# Patient Record
Sex: FEMALE | Race: WHITE | NOT HISPANIC OR LATINO | Employment: OTHER | URBAN - NONMETROPOLITAN AREA
[De-identification: names, ages, dates, MRNs, and addresses within clinical notes are randomized per-mention and may not be internally consistent; named-entity substitution may affect disease eponyms.]

---

## 2017-01-16 ENCOUNTER — OFFICE VISIT (OUTPATIENT)
Dept: UROLOGY | Facility: CLINIC | Age: 72
End: 2017-01-16

## 2017-01-16 VITALS
WEIGHT: 155.6 LBS | SYSTOLIC BLOOD PRESSURE: 120 MMHG | DIASTOLIC BLOOD PRESSURE: 78 MMHG | HEIGHT: 65 IN | TEMPERATURE: 97.3 F | BODY MASS INDEX: 25.92 KG/M2

## 2017-01-16 DIAGNOSIS — R31.0 GROSS HEMATURIA: Primary | ICD-10-CM

## 2017-01-16 LAB
BILIRUB BLD-MCNC: NEGATIVE MG/DL
CLARITY, POC: CLEAR
COLOR UR: YELLOW
GLUCOSE UR STRIP-MCNC: NEGATIVE MG/DL
KETONES UR QL: ABNORMAL
LEUKOCYTE EST, POC: NEGATIVE
NITRITE UR-MCNC: NEGATIVE MG/ML
PH UR: 5 [PH] (ref 5–8)
PROT UR STRIP-MCNC: NEGATIVE MG/DL
RBC # UR STRIP: ABNORMAL /UL
SP GR UR: 1.03 (ref 1–1.03)
UROBILINOGEN UR QL: NORMAL

## 2017-01-16 PROCEDURE — 99204 OFFICE O/P NEW MOD 45 MIN: CPT | Performed by: UROLOGY

## 2017-01-16 PROCEDURE — 88112 CYTOPATH CELL ENHANCE TECH: CPT | Performed by: UROLOGY

## 2017-01-16 PROCEDURE — 81003 URINALYSIS AUTO W/O SCOPE: CPT | Performed by: UROLOGY

## 2017-01-18 LAB
CYTO UR: NORMAL
LAB AP CASE REPORT: NORMAL
Lab: NORMAL
PATH REPORT.FINAL DX SPEC: NORMAL
PATH REPORT.GROSS SPEC: NORMAL

## 2017-01-20 ENCOUNTER — APPOINTMENT (OUTPATIENT)
Dept: CT IMAGING | Facility: HOSPITAL | Age: 72
End: 2017-01-20
Attending: UROLOGY

## 2017-01-23 ENCOUNTER — HOSPITAL ENCOUNTER (OUTPATIENT)
Dept: CT IMAGING | Facility: HOSPITAL | Age: 72
Discharge: HOME | End: 2017-01-23
Attending: UROLOGY | Admitting: UROLOGY

## 2017-01-23 ENCOUNTER — PROCEDURE VISIT (OUTPATIENT)
Dept: UROLOGY | Facility: CLINIC | Age: 72
End: 2017-01-23

## 2017-01-23 VITALS — WEIGHT: 155 LBS | BODY MASS INDEX: 25.83 KG/M2 | HEIGHT: 65 IN

## 2017-01-23 DIAGNOSIS — R31.0 GROSS HEMATURIA: ICD-10-CM

## 2017-01-23 DIAGNOSIS — R31.0 GROSS HEMATURIA: Primary | ICD-10-CM

## 2017-01-23 LAB
BILIRUB BLD-MCNC: NEGATIVE MG/DL
CLARITY, POC: CLEAR
COLOR UR: YELLOW
CREAT BLDA-MCNC: 0.7 MG/DL (ref 0.6–1.3)
GLUCOSE UR STRIP-MCNC: NEGATIVE MG/DL
KETONES UR QL: NEGATIVE
LEUKOCYTE EST, POC: NEGATIVE
NITRITE UR-MCNC: NEGATIVE MG/ML
PH UR: 6 [PH] (ref 5–8)
PROT UR STRIP-MCNC: NEGATIVE MG/DL
RBC # UR STRIP: ABNORMAL /UL
SP GR UR: 1.01 (ref 1–1.03)
UROBILINOGEN UR QL: NORMAL

## 2017-01-23 PROCEDURE — 81003 URINALYSIS AUTO W/O SCOPE: CPT | Performed by: UROLOGY

## 2017-01-23 PROCEDURE — 52000 CYSTOURETHROSCOPY: CPT | Performed by: UROLOGY

## 2017-01-23 PROCEDURE — 0 IOPAMIDOL 61 % SOLUTION: Performed by: UROLOGY

## 2017-01-23 PROCEDURE — 74178 CT ABD&PLV WO CNTR FLWD CNTR: CPT

## 2017-01-23 PROCEDURE — 82565 ASSAY OF CREATININE: CPT

## 2017-01-23 RX ADMIN — IOPAMIDOL 100 ML: 612 INJECTION, SOLUTION INTRAVENOUS at 12:15

## 2017-02-01 ENCOUNTER — PROCEDURE VISIT (OUTPATIENT)
Dept: OTOLARYNGOLOGY | Facility: CLINIC | Age: 72
End: 2017-02-01

## 2017-02-01 DIAGNOSIS — H90.5 HEARING LOSS, SENSORINEURAL: Primary | ICD-10-CM

## 2017-02-01 PROCEDURE — HEARINGNOCHG: Performed by: OTOLARYNGOLOGY

## 2017-04-02 ENCOUNTER — HOSPITAL ENCOUNTER (EMERGENCY)
Facility: HOSPITAL | Age: 72
Discharge: HOME | End: 2017-04-02
Attending: EMERGENCY MEDICINE | Admitting: EMERGENCY MEDICINE

## 2017-04-02 VITALS
RESPIRATION RATE: 14 BRPM | HEIGHT: 65 IN | BODY MASS INDEX: 24.66 KG/M2 | TEMPERATURE: 97.6 F | OXYGEN SATURATION: 95 % | WEIGHT: 148 LBS | SYSTOLIC BLOOD PRESSURE: 115 MMHG | DIASTOLIC BLOOD PRESSURE: 72 MMHG | HEART RATE: 74 BPM

## 2017-04-02 DIAGNOSIS — R53.1 WEAKNESS: Primary | ICD-10-CM

## 2017-04-02 LAB
ALBUMIN SERPL-MCNC: 4.5 G/DL (ref 3.5–5)
ALBUMIN/GLOB SERPL: 1.6 G/DL (ref 1.1–2.5)
ALP SERPL-CCNC: 90 U/L (ref 24–120)
ALT SERPL W P-5'-P-CCNC: 28 U/L (ref 0–54)
ANION GAP SERPL CALCULATED.3IONS-SCNC: 14 MMOL/L (ref 4–13)
AST SERPL-CCNC: 24 U/L (ref 7–45)
BACTERIA UR QL AUTO: ABNORMAL /HPF
BASOPHILS # BLD AUTO: 0.05 10*3/MM3 (ref 0–0.2)
BASOPHILS NFR BLD AUTO: 0.8 % (ref 0–2)
BILIRUB SERPL-MCNC: 0.7 MG/DL (ref 0.1–1)
BILIRUB UR QL STRIP: NEGATIVE
BUN BLD-MCNC: 16 MG/DL (ref 5–21)
BUN/CREAT SERPL: 21.1 (ref 7–25)
CALCIUM SPEC-SCNC: 10.7 MG/DL (ref 8.4–10.4)
CHLORIDE SERPL-SCNC: 101 MMOL/L (ref 98–110)
CLARITY UR: CLEAR
CO2 SERPL-SCNC: 27 MMOL/L (ref 24–31)
COLOR UR: YELLOW
CREAT BLD-MCNC: 0.76 MG/DL (ref 0.5–1.4)
DEPRECATED RDW RBC AUTO: 42.3 FL (ref 40–54)
EOSINOPHIL # BLD AUTO: 0.11 10*3/MM3 (ref 0–0.7)
EOSINOPHIL NFR BLD AUTO: 1.7 % (ref 0–4)
ERYTHROCYTE [DISTWIDTH] IN BLOOD BY AUTOMATED COUNT: 13.6 % (ref 12–15)
GFR SERPL CREATININE-BSD FRML MDRD: 75 ML/MIN/1.73
GLOBULIN UR ELPH-MCNC: 2.9 GM/DL
GLUCOSE BLD-MCNC: 125 MG/DL (ref 70–100)
GLUCOSE BLDC GLUCOMTR-MCNC: 109 MG/DL (ref 70–130)
GLUCOSE UR STRIP-MCNC: NEGATIVE MG/DL
HCT VFR BLD AUTO: 44.3 % (ref 37–47)
HGB BLD-MCNC: 15.1 G/DL (ref 12–16)
HGB UR QL STRIP.AUTO: ABNORMAL
HYALINE CASTS UR QL AUTO: ABNORMAL /LPF
IMM GRANULOCYTES # BLD: 0.02 10*3/MM3 (ref 0–0.03)
IMM GRANULOCYTES NFR BLD: 0.3 % (ref 0–5)
KETONES UR QL STRIP: NEGATIVE
LEUKOCYTE ESTERASE UR QL STRIP.AUTO: NEGATIVE
LYMPHOCYTES # BLD AUTO: 2.6 10*3/MM3 (ref 0.72–4.86)
LYMPHOCYTES NFR BLD AUTO: 39.3 % (ref 15–45)
MCH RBC QN AUTO: 29.2 PG (ref 28–32)
MCHC RBC AUTO-ENTMCNC: 34.1 G/DL (ref 33–36)
MCV RBC AUTO: 85.5 FL (ref 82–98)
MONOCYTES # BLD AUTO: 0.47 10*3/MM3 (ref 0.19–1.3)
MONOCYTES NFR BLD AUTO: 7.1 % (ref 4–12)
NEUTROPHILS # BLD AUTO: 3.37 10*3/MM3 (ref 1.87–8.4)
NEUTROPHILS NFR BLD AUTO: 50.8 % (ref 39–78)
NITRITE UR QL STRIP: NEGATIVE
PH UR STRIP.AUTO: 6 [PH] (ref 5–8)
PLATELET # BLD AUTO: 228 10*3/MM3 (ref 130–400)
PMV BLD AUTO: 10.9 FL (ref 6–12)
POTASSIUM BLD-SCNC: 4.3 MMOL/L (ref 3.5–5.3)
PROT SERPL-MCNC: 7.4 G/DL (ref 6.3–8.7)
PROT UR QL STRIP: NEGATIVE
RBC # BLD AUTO: 5.18 10*6/MM3 (ref 4.2–5.4)
RBC # UR: ABNORMAL /HPF
REF LAB TEST METHOD: ABNORMAL
SODIUM BLD-SCNC: 142 MMOL/L (ref 135–145)
SP GR UR STRIP: <=1.005 (ref 1–1.03)
SQUAMOUS #/AREA URNS HPF: ABNORMAL /HPF
TSH SERPL DL<=0.05 MIU/L-ACNC: 0.04 MIU/ML (ref 0.47–4.68)
UROBILINOGEN UR QL STRIP: ABNORMAL
WBC NRBC COR # BLD: 6.62 10*3/MM3 (ref 4.8–10.8)
WBC UR QL AUTO: ABNORMAL /HPF

## 2017-04-02 PROCEDURE — 82962 GLUCOSE BLOOD TEST: CPT

## 2017-04-02 PROCEDURE — 80053 COMPREHEN METABOLIC PANEL: CPT | Performed by: EMERGENCY MEDICINE

## 2017-04-02 PROCEDURE — 99283 EMERGENCY DEPT VISIT LOW MDM: CPT

## 2017-04-02 PROCEDURE — 85025 COMPLETE CBC W/AUTO DIFF WBC: CPT | Performed by: EMERGENCY MEDICINE

## 2017-04-02 PROCEDURE — 84443 ASSAY THYROID STIM HORMONE: CPT | Performed by: EMERGENCY MEDICINE

## 2017-04-02 PROCEDURE — 81001 URINALYSIS AUTO W/SCOPE: CPT | Performed by: EMERGENCY MEDICINE

## 2017-04-20 ENCOUNTER — HOSPITAL ENCOUNTER (OUTPATIENT)
Dept: NON INVASIVE DIAGNOSTICS | Age: 72
Discharge: HOME | End: 2017-04-20
Payer: MEDICARE

## 2017-04-20 PROCEDURE — 93225 XTRNL ECG REC<48 HRS REC: CPT

## 2017-04-20 PROCEDURE — 93226 XTRNL ECG REC<48 HR SCAN A/R: CPT

## 2017-04-20 PROCEDURE — 93227 XTRNL ECG REC<48 HR R&I: CPT | Performed by: INTERNAL MEDICINE

## 2017-05-08 ENCOUNTER — OFFICE VISIT (OUTPATIENT)
Dept: GASTROENTEROLOGY | Facility: CLINIC | Age: 72
End: 2017-05-08

## 2017-05-08 VITALS
OXYGEN SATURATION: 97 % | WEIGHT: 151 LBS | SYSTOLIC BLOOD PRESSURE: 152 MMHG | HEART RATE: 86 BPM | DIASTOLIC BLOOD PRESSURE: 80 MMHG | BODY MASS INDEX: 25.13 KG/M2

## 2017-05-08 DIAGNOSIS — K59.1 FUNCTIONAL DIARRHEA: Primary | ICD-10-CM

## 2017-05-08 PROCEDURE — 99213 OFFICE O/P EST LOW 20 MIN: CPT | Performed by: INTERNAL MEDICINE

## 2017-05-09 ENCOUNTER — HOSPITAL ENCOUNTER (OUTPATIENT)
Facility: HOSPITAL | Age: 72
Setting detail: HOSPITAL OUTPATIENT SURGERY
End: 2017-05-09
Attending: INTERNAL MEDICINE | Admitting: INTERNAL MEDICINE

## 2017-06-12 ENCOUNTER — APPOINTMENT (OUTPATIENT)
Dept: GENERAL RADIOLOGY | Facility: HOSPITAL | Age: 72
End: 2017-06-12

## 2017-06-12 PROCEDURE — 73660 X-RAY EXAM OF TOE(S): CPT

## 2017-06-14 ENCOUNTER — TELEPHONE (OUTPATIENT)
Dept: INTERNAL MEDICINE | Age: 72
End: 2017-06-14

## 2017-06-21 ENCOUNTER — TELEPHONE (OUTPATIENT)
Dept: GASTROENTEROLOGY | Facility: CLINIC | Age: 72
End: 2017-06-21

## 2017-07-01 ENCOUNTER — OFFICE VISIT (OUTPATIENT)
Dept: URGENT CARE | Age: 72
End: 2017-07-01
Payer: MEDICARE

## 2017-07-01 VITALS
WEIGHT: 150 LBS | HEART RATE: 110 BPM | OXYGEN SATURATION: 97 % | DIASTOLIC BLOOD PRESSURE: 90 MMHG | BODY MASS INDEX: 24.99 KG/M2 | SYSTOLIC BLOOD PRESSURE: 145 MMHG | HEIGHT: 65 IN | RESPIRATION RATE: 20 BRPM | TEMPERATURE: 97.5 F

## 2017-07-01 DIAGNOSIS — S60.10XA NAILBED CONTUSION, FINGER, INITIAL ENCOUNTER: ICD-10-CM

## 2017-07-01 DIAGNOSIS — B35.1 FUNGAL INFECTION OF NAIL: Primary | ICD-10-CM

## 2017-07-01 PROCEDURE — 1036F TOBACCO NON-USER: CPT | Performed by: FAMILY MEDICINE

## 2017-07-01 PROCEDURE — 3014F SCREEN MAMMO DOC REV: CPT | Performed by: FAMILY MEDICINE

## 2017-07-01 PROCEDURE — 99213 OFFICE O/P EST LOW 20 MIN: CPT | Performed by: FAMILY MEDICINE

## 2017-07-01 PROCEDURE — G8428 CUR MEDS NOT DOCUMENT: HCPCS | Performed by: FAMILY MEDICINE

## 2017-07-01 PROCEDURE — G8420 CALC BMI NORM PARAMETERS: HCPCS | Performed by: FAMILY MEDICINE

## 2017-07-01 PROCEDURE — 3017F COLORECTAL CA SCREEN DOC REV: CPT | Performed by: FAMILY MEDICINE

## 2017-07-01 PROCEDURE — 1090F PRES/ABSN URINE INCON ASSESS: CPT | Performed by: FAMILY MEDICINE

## 2017-07-01 PROCEDURE — G8400 PT W/DXA NO RESULTS DOC: HCPCS | Performed by: FAMILY MEDICINE

## 2017-07-01 PROCEDURE — 1123F ACP DISCUSS/DSCN MKR DOCD: CPT | Performed by: FAMILY MEDICINE

## 2017-07-01 PROCEDURE — 4040F PNEUMOC VAC/ADMIN/RCVD: CPT | Performed by: FAMILY MEDICINE

## 2017-07-28 RX ORDER — LORAZEPAM 0.5 MG/1
TABLET ORAL
Qty: 40 TABLET | Refills: 0 | Status: SHIPPED | OUTPATIENT
Start: 2017-07-28 | End: 2017-10-04 | Stop reason: SDUPTHER

## 2017-08-05 PROBLEM — G47.00 PERSISTENT INSOMNIA: Status: ACTIVE | Noted: 2017-08-05

## 2017-08-05 PROBLEM — F41.1 GENERALIZED ANXIETY DISORDER: Status: ACTIVE | Noted: 2017-08-05

## 2017-08-05 PROBLEM — M85.869 OSTEOPENIA OF LOWER LEG: Status: ACTIVE | Noted: 2017-08-05

## 2017-08-05 PROBLEM — E78.00 PURE HYPERCHOLESTEROLEMIA: Status: ACTIVE | Noted: 2017-08-05

## 2017-08-05 PROBLEM — R53.83 FATIGUE: Status: ACTIVE | Noted: 2017-08-05

## 2017-08-05 PROBLEM — I65.29 CAROTID ARTERY STENOSIS: Status: ACTIVE | Noted: 2017-08-05

## 2017-08-05 PROBLEM — E55.9 VITAMIN D DEFICIENCY: Status: ACTIVE | Noted: 2017-08-05

## 2017-08-05 PROBLEM — M17.9 OA (OSTEOARTHRITIS) OF KNEE: Status: ACTIVE | Noted: 2017-08-05

## 2017-08-05 PROBLEM — E04.2 NONTOXIC MULTINODULAR GOITER: Status: ACTIVE | Noted: 2017-08-05

## 2017-08-05 PROBLEM — R73.01 IMPAIRED FASTING GLUCOSE: Status: ACTIVE | Noted: 2017-08-05

## 2017-08-05 PROBLEM — R42 CHRONIC VERTIGO: Status: ACTIVE | Noted: 2017-08-05

## 2017-08-05 PROBLEM — Z12.11 SPECIAL SCREENING FOR MALIGNANT NEOPLASMS, COLON: Status: ACTIVE | Noted: 2017-08-05

## 2017-08-10 DIAGNOSIS — R32 URINARY INCONTINENCE, UNSPECIFIED TYPE: ICD-10-CM

## 2017-08-10 DIAGNOSIS — F41.0 PANIC DISORDER: ICD-10-CM

## 2017-08-10 DIAGNOSIS — R44.0 AUDITORY HALLUCINATIONS: ICD-10-CM

## 2017-08-10 DIAGNOSIS — R00.2 PALPITATIONS: ICD-10-CM

## 2017-08-10 DIAGNOSIS — N95.1 MENOPAUSAL SYMPTOMS: ICD-10-CM

## 2017-08-10 DIAGNOSIS — M50.30 DEGENERATION OF CERVICAL INTERVERTEBRAL DISC: ICD-10-CM

## 2017-08-10 DIAGNOSIS — E05.90 HYPERTHYROIDISM: ICD-10-CM

## 2017-08-10 DIAGNOSIS — M51.37 DISC DEGENERATION, LUMBOSACRAL: ICD-10-CM

## 2017-08-10 DIAGNOSIS — F98.8 ATTENTION DEFICIT DISORDER (ADD) WITHOUT HYPERACTIVITY: ICD-10-CM

## 2017-08-10 DIAGNOSIS — R42 DIZZINESS: ICD-10-CM

## 2017-08-10 DIAGNOSIS — L03.213 PERIORBITAL CELLULITIS OF RIGHT EYE: ICD-10-CM

## 2017-08-10 DIAGNOSIS — R30.0 DYSURIA: ICD-10-CM

## 2017-08-10 DIAGNOSIS — E73.9 LACTOSE INTOLERANCE: ICD-10-CM

## 2017-08-10 DIAGNOSIS — R31.29 MICROHEMATURIA: ICD-10-CM

## 2017-08-10 DIAGNOSIS — J30.9 ALLERGIC RHINITIS, UNSPECIFIED ALLERGIC RHINITIS TRIGGER, UNSPECIFIED RHINITIS SEASONALITY: ICD-10-CM

## 2017-08-10 DIAGNOSIS — E83.52 HYPERCALCEMIA: ICD-10-CM

## 2017-08-10 DIAGNOSIS — I65.21 CAROTID ATHEROSCLEROSIS, RIGHT: ICD-10-CM

## 2017-08-10 DIAGNOSIS — M54.2 CERVICALGIA: ICD-10-CM

## 2017-08-10 DIAGNOSIS — G62.9 PERIPHERAL POLYNEUROPATHY: ICD-10-CM

## 2017-08-10 DIAGNOSIS — I10 PAROXYSMAL HYPERTENSION: ICD-10-CM

## 2017-08-10 PROBLEM — I65.29 CAROTID ATHEROSCLEROSIS: Status: ACTIVE | Noted: 2017-08-10

## 2017-08-18 RX ORDER — ERGOCALCIFEROL 1.25 MG/1
50000 CAPSULE ORAL WEEKLY
Qty: 4 CAPSULE | Refills: 3 | Status: SHIPPED | OUTPATIENT
Start: 2017-08-18 | End: 2017-10-04 | Stop reason: SDUPTHER

## 2017-08-25 ENCOUNTER — HOSPITAL ENCOUNTER (EMERGENCY)
Facility: HOSPITAL | Age: 72
Discharge: HOME | End: 2017-08-26
Admitting: FAMILY MEDICINE

## 2017-08-25 DIAGNOSIS — R42 LIGHT-HEADED FEELING: Primary | ICD-10-CM

## 2017-08-25 PROCEDURE — 93005 ELECTROCARDIOGRAM TRACING: CPT | Performed by: PHYSICIAN ASSISTANT

## 2017-08-25 PROCEDURE — 99284 EMERGENCY DEPT VISIT MOD MDM: CPT

## 2017-08-25 PROCEDURE — 83735 ASSAY OF MAGNESIUM: CPT | Performed by: PHYSICIAN ASSISTANT

## 2017-08-25 PROCEDURE — 85025 COMPLETE CBC W/AUTO DIFF WBC: CPT | Performed by: PHYSICIAN ASSISTANT

## 2017-08-25 PROCEDURE — 84484 ASSAY OF TROPONIN QUANT: CPT | Performed by: PHYSICIAN ASSISTANT

## 2017-08-25 PROCEDURE — 93010 ELECTROCARDIOGRAM REPORT: CPT | Performed by: INTERNAL MEDICINE

## 2017-08-25 PROCEDURE — 80053 COMPREHEN METABOLIC PANEL: CPT | Performed by: PHYSICIAN ASSISTANT

## 2017-08-26 ENCOUNTER — APPOINTMENT (OUTPATIENT)
Dept: CT IMAGING | Facility: HOSPITAL | Age: 72
End: 2017-08-26

## 2017-08-26 VITALS
WEIGHT: 149 LBS | OXYGEN SATURATION: 98 % | SYSTOLIC BLOOD PRESSURE: 134 MMHG | RESPIRATION RATE: 16 BRPM | HEART RATE: 72 BPM | HEIGHT: 65 IN | TEMPERATURE: 97.6 F | BODY MASS INDEX: 24.83 KG/M2 | DIASTOLIC BLOOD PRESSURE: 75 MMHG

## 2017-08-26 LAB
ALBUMIN SERPL-MCNC: 4.4 G/DL (ref 3.5–5)
ALBUMIN/GLOB SERPL: 1.7 G/DL (ref 1.1–2.5)
ALP SERPL-CCNC: 70 U/L (ref 24–120)
ALT SERPL W P-5'-P-CCNC: 33 U/L (ref 0–54)
ANION GAP SERPL CALCULATED.3IONS-SCNC: 12 MMOL/L (ref 4–13)
AST SERPL-CCNC: 24 U/L (ref 7–45)
BASOPHILS # BLD AUTO: 0.05 10*3/MM3 (ref 0–0.2)
BASOPHILS NFR BLD AUTO: 0.7 % (ref 0–2)
BILIRUB SERPL-MCNC: 0.6 MG/DL (ref 0.1–1)
BUN BLD-MCNC: 13 MG/DL (ref 5–21)
BUN/CREAT SERPL: 21 (ref 7–25)
CALCIUM SPEC-SCNC: 9.7 MG/DL (ref 8.4–10.4)
CHLORIDE SERPL-SCNC: 109 MMOL/L (ref 98–110)
CO2 SERPL-SCNC: 23 MMOL/L (ref 24–31)
CREAT BLD-MCNC: 0.62 MG/DL (ref 0.5–1.4)
DEPRECATED RDW RBC AUTO: 44.9 FL (ref 40–54)
EOSINOPHIL # BLD AUTO: 0.04 10*3/MM3 (ref 0–0.7)
EOSINOPHIL NFR BLD AUTO: 0.6 % (ref 0–4)
ERYTHROCYTE [DISTWIDTH] IN BLOOD BY AUTOMATED COUNT: 14 % (ref 12–15)
GFR SERPL CREATININE-BSD FRML MDRD: 94 ML/MIN/1.73
GLOBULIN UR ELPH-MCNC: 2.6 GM/DL
GLUCOSE BLD-MCNC: 120 MG/DL (ref 70–100)
HCT VFR BLD AUTO: 41.6 % (ref 37–47)
HGB BLD-MCNC: 13.8 G/DL (ref 12–16)
IMM GRANULOCYTES # BLD: 0.02 10*3/MM3 (ref 0–0.03)
IMM GRANULOCYTES NFR BLD: 0.3 % (ref 0–5)
LYMPHOCYTES # BLD AUTO: 1.48 10*3/MM3 (ref 0.72–4.86)
LYMPHOCYTES NFR BLD AUTO: 20.4 % (ref 15–45)
MAGNESIUM SERPL-MCNC: 2 MG/DL (ref 1.4–2.2)
MCH RBC QN AUTO: 29.1 PG (ref 28–32)
MCHC RBC AUTO-ENTMCNC: 33.2 G/DL (ref 33–36)
MCV RBC AUTO: 87.8 FL (ref 82–98)
MONOCYTES # BLD AUTO: 0.43 10*3/MM3 (ref 0.19–1.3)
MONOCYTES NFR BLD AUTO: 5.9 % (ref 4–12)
NEUTROPHILS # BLD AUTO: 5.23 10*3/MM3 (ref 1.87–8.4)
NEUTROPHILS NFR BLD AUTO: 72.1 % (ref 39–78)
PLATELET # BLD AUTO: 229 10*3/MM3 (ref 130–400)
PMV BLD AUTO: 11.1 FL (ref 6–12)
POTASSIUM BLD-SCNC: 3.8 MMOL/L (ref 3.5–5.3)
PROT SERPL-MCNC: 7 G/DL (ref 6.3–8.7)
RBC # BLD AUTO: 4.74 10*6/MM3 (ref 4.2–5.4)
SODIUM BLD-SCNC: 144 MMOL/L (ref 135–145)
TROPONIN I SERPL-MCNC: <0.012 NG/ML (ref 0–0.03)
WBC NRBC COR # BLD: 7.25 10*3/MM3 (ref 4.8–10.8)

## 2017-08-26 PROCEDURE — 70450 CT HEAD/BRAIN W/O DYE: CPT

## 2017-10-04 ENCOUNTER — OFFICE VISIT (OUTPATIENT)
Dept: INTERNAL MEDICINE | Age: 72
End: 2017-10-04
Payer: MEDICARE

## 2017-10-04 VITALS
HEART RATE: 63 BPM | DIASTOLIC BLOOD PRESSURE: 72 MMHG | OXYGEN SATURATION: 98 % | BODY MASS INDEX: 24.66 KG/M2 | RESPIRATION RATE: 18 BRPM | HEIGHT: 65 IN | WEIGHT: 148 LBS | TEMPERATURE: 97.8 F | SYSTOLIC BLOOD PRESSURE: 124 MMHG

## 2017-10-04 DIAGNOSIS — R19.7 DIARRHEA, UNSPECIFIED TYPE: ICD-10-CM

## 2017-10-04 DIAGNOSIS — R19.7 DIARRHEA, UNSPECIFIED TYPE: Primary | ICD-10-CM

## 2017-10-04 LAB
ALBUMIN SERPL-MCNC: 4.4 G/DL (ref 3.5–5.2)
ALP BLD-CCNC: 90 U/L (ref 35–104)
ALT SERPL-CCNC: 17 U/L (ref 5–33)
ANION GAP SERPL CALCULATED.3IONS-SCNC: 14 MMOL/L (ref 7–19)
AST SERPL-CCNC: 16 U/L (ref 5–32)
BILIRUB SERPL-MCNC: 0.5 MG/DL (ref 0.2–1.2)
BUN BLDV-MCNC: 10 MG/DL (ref 8–23)
CALCIUM SERPL-MCNC: 9.8 MG/DL (ref 8.8–10.2)
CHLORIDE BLD-SCNC: 102 MMOL/L (ref 98–111)
CO2: 25 MMOL/L (ref 22–29)
CREAT SERPL-MCNC: 0.6 MG/DL (ref 0.5–0.9)
GFR NON-AFRICAN AMERICAN: >60
GLUCOSE BLD-MCNC: 101 MG/DL (ref 74–109)
HCT VFR BLD CALC: 44.8 % (ref 37–47)
HEMOGLOBIN: 14.6 G/DL (ref 12–16)
MCH RBC QN AUTO: 29.2 PG (ref 27–31)
MCHC RBC AUTO-ENTMCNC: 32.6 G/DL (ref 33–37)
MCV RBC AUTO: 89.6 FL (ref 81–99)
PDW BLD-RTO: 13.2 % (ref 11.5–14.5)
PLATELET # BLD: 211 K/UL (ref 130–400)
PMV BLD AUTO: 11.2 FL (ref 9.4–12.3)
POTASSIUM SERPL-SCNC: 4.4 MMOL/L (ref 3.5–5)
RBC # BLD: 5 M/UL (ref 4.2–5.4)
SODIUM BLD-SCNC: 141 MMOL/L (ref 136–145)
TOTAL PROTEIN: 6.9 G/DL (ref 6.6–8.7)
WBC # BLD: 7.1 K/UL (ref 4.8–10.8)

## 2017-10-04 PROCEDURE — G8420 CALC BMI NORM PARAMETERS: HCPCS | Performed by: NURSE PRACTITIONER

## 2017-10-04 PROCEDURE — G8598 ASA/ANTIPLAT THER USED: HCPCS | Performed by: NURSE PRACTITIONER

## 2017-10-04 PROCEDURE — G8427 DOCREV CUR MEDS BY ELIG CLIN: HCPCS | Performed by: NURSE PRACTITIONER

## 2017-10-04 PROCEDURE — G8400 PT W/DXA NO RESULTS DOC: HCPCS | Performed by: NURSE PRACTITIONER

## 2017-10-04 PROCEDURE — 3014F SCREEN MAMMO DOC REV: CPT | Performed by: NURSE PRACTITIONER

## 2017-10-04 PROCEDURE — 4040F PNEUMOC VAC/ADMIN/RCVD: CPT | Performed by: NURSE PRACTITIONER

## 2017-10-04 PROCEDURE — 1036F TOBACCO NON-USER: CPT | Performed by: NURSE PRACTITIONER

## 2017-10-04 PROCEDURE — 1123F ACP DISCUSS/DSCN MKR DOCD: CPT | Performed by: NURSE PRACTITIONER

## 2017-10-04 PROCEDURE — 3017F COLORECTAL CA SCREEN DOC REV: CPT | Performed by: NURSE PRACTITIONER

## 2017-10-04 PROCEDURE — G8484 FLU IMMUNIZE NO ADMIN: HCPCS | Performed by: NURSE PRACTITIONER

## 2017-10-04 PROCEDURE — 1090F PRES/ABSN URINE INCON ASSESS: CPT | Performed by: NURSE PRACTITIONER

## 2017-10-04 PROCEDURE — 99213 OFFICE O/P EST LOW 20 MIN: CPT | Performed by: NURSE PRACTITIONER

## 2017-10-04 RX ORDER — LORAZEPAM 0.5 MG/1
TABLET ORAL
Qty: 40 TABLET | Refills: 0 | Status: SHIPPED | OUTPATIENT
Start: 2017-10-04 | End: 2018-01-05 | Stop reason: SDUPTHER

## 2017-10-04 RX ORDER — ERGOCALCIFEROL 1.25 MG/1
50000 CAPSULE ORAL WEEKLY
Qty: 4 CAPSULE | Refills: 3 | Status: SHIPPED | OUTPATIENT
Start: 2017-10-04 | End: 2018-01-05 | Stop reason: SDUPTHER

## 2017-10-04 RX ORDER — ZALEPLON 10 MG/1
10 CAPSULE ORAL NIGHTLY
Qty: 30 CAPSULE | Refills: 0 | Status: SHIPPED | OUTPATIENT
Start: 2017-10-04 | End: 2018-01-05 | Stop reason: SDUPTHER

## 2017-10-04 ASSESSMENT — ENCOUNTER SYMPTOMS
ABDOMINAL DISTENTION: 0
SHORTNESS OF BREATH: 0
WHEEZING: 0
STRIDOR: 0
ABDOMINAL PAIN: 0
BLOOD IN STOOL: 0
COUGH: 0
CHOKING: 0
EYE DISCHARGE: 0
EYE ITCHING: 0
DIARRHEA: 1
TROUBLE SWALLOWING: 0
NAUSEA: 0
VOMITING: 0
CONSTIPATION: 0
SORE THROAT: 0
COLOR CHANGE: 0

## 2017-10-04 NOTE — PROGRESS NOTES
Never Used    Alcohol use Not on file      Current Outpatient Prescriptions   Medication Sig Dispense Refill    vitamin D (ERGOCALCIFEROL) 61839 units CAPS capsule Take 1 capsule by mouth once a week 4 capsule 3    zaleplon (SONATA) 10 MG capsule Take 1 capsule by mouth nightly 30 capsule 0    LORazepam (ATIVAN) 0.5 MG tablet Take twice a day as needed for anxiety 40 tablet 0    Calcium Carbonate-Vitamin D (CALCIUM-D) 600-400 MG-UNIT TABS Take by mouth daily      Coenzyme Q10 (CO Q-10) 200 MG CAPS Take 1 capsule by mouth 2 times daily      aspirin 81 MG tablet Take 81 mg by mouth daily      lidocaine (XYLOCAINE) 2 % jelly Apply topically      ciclopirox (PENLAC) 8 % solution Apply topically nightly for 48 weeks 6 mL 11    Efinaconazole 10 % SOLN Apply topically nightly for 48 weeks. 8 mL 11    cetirizine (ZYRTEC) 10 MG tablet Take 10 mg by mouth daily      Biotin (BIOTIN 5000) 5 MG CAPS Take by mouth      nortriptyline (PAMELOR) 10 MG capsule Take 10 mg by mouth nightly      Ascorbic Acid (VITAMIN C) 500 MG tablet Take 500 mg by mouth daily      Chromium Picolinate 800 MCG TABS Take 1 tablet by mouth daily      Misc Natural Products (GINKOGIN PO) Take 30 mg by mouth daily      celecoxib (CELEBREX) 200 MG capsule Take 200 mg by mouth daily      estradiol (ESTRACE) 0.1 MG/GM vaginal cream Place 2 g vaginally Twice a Week      meclizine (ANTIVERT) 25 MG tablet Take 25 mg by mouth 3 times daily as needed      pantoprazole (PROTONIX) 40 MG tablet Take 40 mg by mouth daily       No current facility-administered medications for this visit.       Allergies   Allergen Reactions    Latex     Alcohol      liquid  liquid    Astelin [Azelastine Hcl]      solution  solution    Ciprofloxacin Hcl Swelling    Fluticasone Propionate      suspension  suspension    Hydroquinone      cream    Livalo [Pitavastatin]      tablets    Other      seafood  seafood    Vitamin B Complex [B-100]      Vit B 3  Comprehensive Metabolic Panel       Follow-up:  No Follow-up on file. PATIENT INSTRUCTIONS:  There are no Patient Instructions on file for this visit.   Electronically signed by RONALD Partida on 10/4/2017 at 3:34 PM

## 2017-10-04 NOTE — PATIENT INSTRUCTIONS
#1 diarrhea we will get labs today just to ensure that her electrolytes are normal. We will also send an order for colonoscopy screening for Dr. Eric Frederick per her request

## 2017-10-08 ENCOUNTER — HOSPITAL ENCOUNTER (EMERGENCY)
Facility: HOSPITAL | Age: 72
Discharge: HOME | End: 2017-10-08
Attending: EMERGENCY MEDICINE | Admitting: EMERGENCY MEDICINE

## 2017-10-08 VITALS
TEMPERATURE: 98.4 F | DIASTOLIC BLOOD PRESSURE: 75 MMHG | OXYGEN SATURATION: 96 % | BODY MASS INDEX: 24.66 KG/M2 | WEIGHT: 148 LBS | HEIGHT: 65 IN | HEART RATE: 83 BPM | RESPIRATION RATE: 16 BRPM | SYSTOLIC BLOOD PRESSURE: 98 MMHG

## 2017-10-08 DIAGNOSIS — R19.7 DIARRHEA, UNSPECIFIED TYPE: Primary | ICD-10-CM

## 2017-10-08 LAB
ALBUMIN SERPL-MCNC: 4.6 G/DL (ref 3.5–5)
ALBUMIN/GLOB SERPL: 1.7 G/DL (ref 1.1–2.5)
ALP SERPL-CCNC: 85 U/L (ref 24–120)
ALT SERPL W P-5'-P-CCNC: 31 U/L (ref 0–54)
AMYLASE SERPL-CCNC: 77 U/L (ref 30–110)
ANION GAP SERPL CALCULATED.3IONS-SCNC: 13 MMOL/L (ref 4–13)
AST SERPL-CCNC: 19 U/L (ref 7–45)
BASOPHILS # BLD AUTO: 0.05 10*3/MM3 (ref 0–0.2)
BASOPHILS NFR BLD AUTO: 0.9 % (ref 0–2)
BILIRUB SERPL-MCNC: 0.7 MG/DL (ref 0.1–1)
BUN BLD-MCNC: 10 MG/DL (ref 5–21)
BUN/CREAT SERPL: 13.3 (ref 7–25)
CALCIUM SPEC-SCNC: 10 MG/DL (ref 8.4–10.4)
CHLORIDE SERPL-SCNC: 105 MMOL/L (ref 98–110)
CO2 SERPL-SCNC: 25 MMOL/L (ref 24–31)
CREAT BLD-MCNC: 0.75 MG/DL (ref 0.5–1.4)
D-LACTATE SERPL-SCNC: 1.2 MMOL/L (ref 0.5–2)
DEPRECATED RDW RBC AUTO: 42.5 FL (ref 40–54)
EOSINOPHIL # BLD AUTO: 0.05 10*3/MM3 (ref 0–0.7)
EOSINOPHIL NFR BLD AUTO: 0.9 % (ref 0–4)
ERYTHROCYTE [DISTWIDTH] IN BLOOD BY AUTOMATED COUNT: 13.6 % (ref 12–15)
GFR SERPL CREATININE-BSD FRML MDRD: 76 ML/MIN/1.73
GLOBULIN UR ELPH-MCNC: 2.7 GM/DL
GLUCOSE BLD-MCNC: 101 MG/DL (ref 70–100)
HCT VFR BLD AUTO: 43.9 % (ref 37–47)
HGB BLD-MCNC: 14.8 G/DL (ref 12–16)
HOLD SPECIMEN: NORMAL
HOLD SPECIMEN: NORMAL
IMM GRANULOCYTES # BLD: 0.01 10*3/MM3 (ref 0–0.03)
IMM GRANULOCYTES NFR BLD: 0.2 % (ref 0–5)
LIPASE SERPL-CCNC: 55 U/L (ref 23–203)
LYMPHOCYTES # BLD AUTO: 2.13 10*3/MM3 (ref 0.72–4.86)
LYMPHOCYTES NFR BLD AUTO: 36.4 % (ref 15–45)
MCH RBC QN AUTO: 29.2 PG (ref 28–32)
MCHC RBC AUTO-ENTMCNC: 33.7 G/DL (ref 33–36)
MCV RBC AUTO: 86.6 FL (ref 82–98)
MONOCYTES # BLD AUTO: 0.47 10*3/MM3 (ref 0.19–1.3)
MONOCYTES NFR BLD AUTO: 8 % (ref 4–12)
NEUTROPHILS # BLD AUTO: 3.14 10*3/MM3 (ref 1.87–8.4)
NEUTROPHILS NFR BLD AUTO: 53.6 % (ref 39–78)
PLATELET # BLD AUTO: 220 10*3/MM3 (ref 130–400)
PMV BLD AUTO: 11.7 FL (ref 6–12)
POTASSIUM BLD-SCNC: 4.3 MMOL/L (ref 3.5–5.3)
PROT SERPL-MCNC: 7.3 G/DL (ref 6.3–8.7)
RBC # BLD AUTO: 5.07 10*6/MM3 (ref 4.2–5.4)
SODIUM BLD-SCNC: 143 MMOL/L (ref 135–145)
WBC NRBC COR # BLD: 5.85 10*3/MM3 (ref 4.8–10.8)
WHOLE BLOOD HOLD SPECIMEN: NORMAL
WHOLE BLOOD HOLD SPECIMEN: NORMAL

## 2017-10-08 PROCEDURE — 99284 EMERGENCY DEPT VISIT MOD MDM: CPT

## 2017-10-08 PROCEDURE — 80053 COMPREHEN METABOLIC PANEL: CPT | Performed by: EMERGENCY MEDICINE

## 2017-10-08 PROCEDURE — 82150 ASSAY OF AMYLASE: CPT | Performed by: EMERGENCY MEDICINE

## 2017-10-08 PROCEDURE — 83605 ASSAY OF LACTIC ACID: CPT | Performed by: EMERGENCY MEDICINE

## 2017-10-08 PROCEDURE — 83690 ASSAY OF LIPASE: CPT | Performed by: EMERGENCY MEDICINE

## 2017-10-08 PROCEDURE — 85025 COMPLETE CBC W/AUTO DIFF WBC: CPT | Performed by: EMERGENCY MEDICINE

## 2017-10-08 RX ORDER — SODIUM CHLORIDE 0.9 % (FLUSH) 0.9 %
10 SYRINGE (ML) INJECTION AS NEEDED
Status: DISCONTINUED | OUTPATIENT
Start: 2017-10-08 | End: 2017-10-08 | Stop reason: HOSPADM

## 2017-10-09 ENCOUNTER — APPOINTMENT (OUTPATIENT)
Dept: LAB | Facility: HOSPITAL | Age: 72
End: 2017-10-09

## 2017-10-09 ENCOUNTER — TELEPHONE (OUTPATIENT)
Dept: GASTROENTEROLOGY | Facility: CLINIC | Age: 72
End: 2017-10-09

## 2017-10-09 LAB
ADV 40+41 DNA STL QL NAA+NON-PROBE: NOT DETECTED
ASTRO TYP 1-8 RNA STL QL NAA+NON-PROBE: NOT DETECTED
C CAYETANENSIS DNA STL QL NAA+NON-PROBE: NOT DETECTED
C DIFF TOX GENS STL QL NAA+PROBE: NOT DETECTED
CAMPY SP DNA.DIARRHEA STL QL NAA+PROBE: NOT DETECTED
CRYPTOSP STL CULT: NOT DETECTED
E COLI DNA SPEC QL NAA+PROBE: NOT DETECTED
E HISTOLYT AG STL-ACNC: NOT DETECTED
EAEC PAA PLAS AGGR+AATA ST NAA+NON-PRB: NOT DETECTED
EC STX1+STX2 GENES STL QL NAA+NON-PROBE: NOT DETECTED
EPEC EAE GENE STL QL NAA+NON-PROBE: NOT DETECTED
ETEC LTA+ST1A+ST1B TOX ST NAA+NON-PROBE: NOT DETECTED
G LAMBLIA DNA SPEC QL NAA+PROBE: NOT DETECTED
NOROVIRUS GI+II RNA STL QL NAA+NON-PROBE: NOT DETECTED
P SHIGELLOIDES DNA STL QL NAA+NON-PROBE: NOT DETECTED
RV RNA STL NAA+PROBE: NOT DETECTED
SALMONELLA DNA SPEC QL NAA+PROBE: NOT DETECTED
SAPO I+II+IV+V RNA STL QL NAA+NON-PROBE: NOT DETECTED
SHIGELLA SP+EIEC IPAH ST NAA+NON-PROBE: NOT DETECTED
V CHOLERAE DNA SPEC QL NAA+PROBE: NOT DETECTED
VIBRIO DNA SPEC NAA+PROBE: NOT DETECTED
YERSINIA STL CULT: NOT DETECTED

## 2017-10-09 PROCEDURE — 87999 UNLISTED MICROBIOLOGY PX: CPT | Performed by: EMERGENCY MEDICINE

## 2017-10-10 ENCOUNTER — OFFICE VISIT (OUTPATIENT)
Dept: GASTROENTEROLOGY | Facility: CLINIC | Age: 72
End: 2017-10-10

## 2017-10-10 VITALS
BODY MASS INDEX: 24.66 KG/M2 | HEART RATE: 72 BPM | WEIGHT: 148 LBS | OXYGEN SATURATION: 97 % | HEIGHT: 65 IN | DIASTOLIC BLOOD PRESSURE: 70 MMHG | SYSTOLIC BLOOD PRESSURE: 122 MMHG

## 2017-10-10 DIAGNOSIS — R19.7 DIARRHEA IN ADULT PATIENT: ICD-10-CM

## 2017-10-10 DIAGNOSIS — R19.4 CHANGE IN BOWEL HABITS: Primary | ICD-10-CM

## 2017-10-10 PROCEDURE — 99214 OFFICE O/P EST MOD 30 MIN: CPT | Performed by: CLINICAL NURSE SPECIALIST

## 2017-10-10 RX ORDER — LACTOBACILLUS RHAMNOSUS GG 10B CELL
1 CAPSULE ORAL DAILY
COMMUNITY
End: 2019-11-08

## 2017-10-10 RX ORDER — UBIDECARENONE 75 MG
1 CAPSULE ORAL DAILY
COMMUNITY

## 2017-10-10 RX ORDER — SODIUM, POTASSIUM,MAG SULFATES 17.5-3.13G
SOLUTION, RECONSTITUTED, ORAL ORAL
Qty: 2 BOTTLE | Refills: 0 | Status: ON HOLD | OUTPATIENT
Start: 2017-10-10 | End: 2018-05-07

## 2017-10-10 RX ORDER — MELATONIN
1000 DAILY
COMMUNITY
End: 2019-11-08 | Stop reason: SDUPTHER

## 2017-10-31 ENCOUNTER — OFFICE VISIT (OUTPATIENT)
Dept: INTERNAL MEDICINE | Age: 72
End: 2017-10-31
Payer: MEDICARE

## 2017-10-31 VITALS
SYSTOLIC BLOOD PRESSURE: 122 MMHG | WEIGHT: 145.6 LBS | BODY MASS INDEX: 24.26 KG/M2 | HEIGHT: 65 IN | HEART RATE: 83 BPM | DIASTOLIC BLOOD PRESSURE: 64 MMHG | OXYGEN SATURATION: 93 %

## 2017-10-31 DIAGNOSIS — E05.90 HYPERTHYROIDISM: ICD-10-CM

## 2017-10-31 DIAGNOSIS — Z00.00 MEDICARE ANNUAL WELLNESS VISIT, SUBSEQUENT: Primary | ICD-10-CM

## 2017-10-31 DIAGNOSIS — E78.00 PURE HYPERCHOLESTEROLEMIA: ICD-10-CM

## 2017-10-31 DIAGNOSIS — E55.9 VITAMIN D DEFICIENCY: ICD-10-CM

## 2017-10-31 DIAGNOSIS — K52.9 POSTPRANDIAL DIARRHEA: ICD-10-CM

## 2017-10-31 DIAGNOSIS — Z23 IMMUNIZATION DUE: ICD-10-CM

## 2017-10-31 DIAGNOSIS — F41.1 GENERALIZED ANXIETY DISORDER: Primary | ICD-10-CM

## 2017-10-31 DIAGNOSIS — L60.8 DISCOLORATION OF NAIL: ICD-10-CM

## 2017-10-31 DIAGNOSIS — R73.01 IMPAIRED FASTING GLUCOSE: ICD-10-CM

## 2017-10-31 PROCEDURE — 4040F PNEUMOC VAC/ADMIN/RCVD: CPT | Performed by: INTERNAL MEDICINE

## 2017-10-31 PROCEDURE — G8400 PT W/DXA NO RESULTS DOC: HCPCS | Performed by: INTERNAL MEDICINE

## 2017-10-31 PROCEDURE — 3014F SCREEN MAMMO DOC REV: CPT | Performed by: INTERNAL MEDICINE

## 2017-10-31 PROCEDURE — G0008 ADMIN INFLUENZA VIRUS VAC: HCPCS | Performed by: INTERNAL MEDICINE

## 2017-10-31 PROCEDURE — 99213 OFFICE O/P EST LOW 20 MIN: CPT | Performed by: INTERNAL MEDICINE

## 2017-10-31 PROCEDURE — G8420 CALC BMI NORM PARAMETERS: HCPCS | Performed by: INTERNAL MEDICINE

## 2017-10-31 PROCEDURE — G8427 DOCREV CUR MEDS BY ELIG CLIN: HCPCS | Performed by: INTERNAL MEDICINE

## 2017-10-31 PROCEDURE — 1123F ACP DISCUSS/DSCN MKR DOCD: CPT | Performed by: INTERNAL MEDICINE

## 2017-10-31 PROCEDURE — 90662 IIV NO PRSV INCREASED AG IM: CPT | Performed by: INTERNAL MEDICINE

## 2017-10-31 PROCEDURE — 1036F TOBACCO NON-USER: CPT | Performed by: INTERNAL MEDICINE

## 2017-10-31 PROCEDURE — 1090F PRES/ABSN URINE INCON ASSESS: CPT | Performed by: INTERNAL MEDICINE

## 2017-10-31 PROCEDURE — G8598 ASA/ANTIPLAT THER USED: HCPCS | Performed by: INTERNAL MEDICINE

## 2017-10-31 PROCEDURE — 3017F COLORECTAL CA SCREEN DOC REV: CPT | Performed by: INTERNAL MEDICINE

## 2017-10-31 PROCEDURE — G8484 FLU IMMUNIZE NO ADMIN: HCPCS | Performed by: INTERNAL MEDICINE

## 2017-10-31 RX ORDER — MONTELUKAST SODIUM 4 MG/1
TABLET, CHEWABLE ORAL
Qty: 30 TABLET | Refills: 3 | Status: SHIPPED | OUTPATIENT
Start: 2017-10-31 | End: 2018-01-05 | Stop reason: CLARIF

## 2017-10-31 RX ORDER — TRETINOIN 1 MG/G
CREAM TOPICAL
Qty: 20 G | Refills: 1 | Status: SHIPPED | OUTPATIENT
Start: 2017-10-31 | End: 2018-11-07 | Stop reason: SDUPTHER

## 2017-10-31 ASSESSMENT — ENCOUNTER SYMPTOMS
ABDOMINAL PAIN: 0
COUGH: 0
WHEEZING: 0
SORE THROAT: 0
DIARRHEA: 1
CHEST TIGHTNESS: 0
CONSTIPATION: 0

## 2017-10-31 NOTE — PROGRESS NOTES
screening for malignant neoplasms, colon 08/05/2017     Overview Note:     2017: appt was cancelled      OA (osteoarthritis) of knee 08/05/2017     Past Medical History:   Diagnosis Date    ADD (attention deficit disorder)     Carotid atherosclerosis     Degeneration of cervical intervertebral disc     Mixed hyperlipidemia     Nontoxic multinodular goiter     OA (osteoarthritis)     Osteopenia     Panic disorder without agoraphobia     Peripheral neuropathy ([facility] [address]     Pure hypercholesterolemia     Urinary incontinence     Vitamin D deficiency       Past Surgical History:   Procedure Laterality Date    COLONOSCOPY      HYSTERECTOMY, TOTAL ABDOMINAL      w/removal of both ovaries    JOINT REPLACEMENT      knee    TOTAL KNEE ARTHROPLASTY Right 2011    TOTAL KNEE ARTHROPLASTY Left 2012    VAGINA SURGERY      Abdomino-Vaginal Vesical Neck Suspension     Current Outpatient Prescriptions   Medication Sig Dispense Refill    tretinoin (RETIN-A) 0.1 % cream Apply topically nightly.  20 g 1    colestipol (COLESTID) 1 g tablet Take one daily as needed 30 tablet 3    vitamin D (ERGOCALCIFEROL) 51096 units CAPS capsule Take 1 capsule by mouth once a week 4 capsule 3    zaleplon (SONATA) 10 MG capsule Take 1 capsule by mouth nightly 30 capsule 0    LORazepam (ATIVAN) 0.5 MG tablet Take twice a day as needed for anxiety 40 tablet 0    cetirizine (ZYRTEC) 10 MG tablet Take 10 mg by mouth daily      Coenzyme Q10 (CO Q-10) 200 MG CAPS Take 1 capsule by mouth daily       Chromium Picolinate 800 MCG TABS Take 1 tablet by mouth daily      Misc Natural Products (GINKOGIN PO) Take 50 mg by mouth daily       aspirin 81 MG tablet Take 81 mg by mouth daily      meclizine (ANTIVERT) 25 MG tablet Take 25 mg by mouth 3 times daily as needed      pantoprazole (PROTONIX) 40 MG tablet Take 40 mg by mouth daily      Ascorbic Acid (VITAMIN C) 500 MG tablet Take 500 mg by mouth daily       No current facility-administered scleral icterus. Neck: Normal range of motion. No JVD present. No thyromegaly present. Cardiovascular: Normal rate and regular rhythm. No murmur heard. Pulmonary/Chest: Breath sounds normal. She has no wheezes. She has no rales. She exhibits no tenderness. Abdominal: Bowel sounds are normal. She exhibits no distension. There is no guarding. Musculoskeletal: She exhibits no edema. Lymphadenopathy:     She has no cervical adenopathy. Neurological: She is oriented to person, place, and time. She has normal reflexes. No cranial nerve deficit. Coordination normal.   Skin: Skin is dry. No rash noted. She is not diaphoretic. No erythema. Rt thumb - small subungual dicoloration/ likely initally was small hematoma   Psychiatric: Her behavior is normal. Judgment and thought content normal.       Lab Review   Orders Only on 10/04/2017   Component Date Value    WBC 10/04/2017 7.1     RBC 10/04/2017 5.00     Hemoglobin 10/04/2017 14.6     Hematocrit 10/04/2017 44.8     MCV 10/04/2017 89.6     MCH 10/04/2017 29.2     MCHC 10/04/2017 32.6*    RDW 10/04/2017 13.2     Platelets 93/36/0951 211     MPV 10/04/2017 11.2     Sodium 10/04/2017 141     Potassium 10/04/2017 4.4     Chloride 10/04/2017 102     CO2 10/04/2017 25     Anion Gap 10/04/2017 14     Glucose 10/04/2017 101     BUN 10/04/2017 10     CREATININE 10/04/2017 0.6     GFR Non- 10/04/2017 >60     Calcium 10/04/2017 9.8     Total Protein 10/04/2017 6.9     Alb 10/04/2017 4.4     Total Bilirubin 10/04/2017 0.5     Alkaline Phosphatase 10/04/2017 90     ALT 10/04/2017 17     AST 10/04/2017 16            ASSESSMENT/PLAN:  Generalized anxiety disorder-Symptoms have been better, she takes when necessary Xanax and does not need refills today    Postprandial diarrhea-Patient is scheduled for colonoscopy within next 2 weeks. Suggest a trial of Colestid after her colonoscopy unless GI has different recommendations.  Colestid prescription for 1 g daily sent to the pharmacy    Discoloration of nail-description as above. This time suggest ot observe- re- evaluate at her appt in  2 months    Immunization due  -     INFLUENZA, HIGH DOSE, 65 YRS +, IM, PF, PREFILL SYR, 0.5ML (FLUZONE HD)    Other orders  -     tretinoin (RETIN-A) 0.1 % cream; Apply topically nightly. -     colestipol (COLESTID) 1 g tablet; Take one daily as needed              Orders Placed This Encounter   Procedures    INFLUENZA, HIGH DOSE, 65 YRS +, IM, PF, PREFILL SYR, 0.5ML (FLUZONE HD)     New Prescriptions    COLESTIPOL (COLESTID) 1 G TABLET    Take one daily as needed    TRETINOIN (RETIN-A) 0.1 % CREAM    Apply topically nightly. Return in about 2 months (around 1/4/2018) for medicare wellness, Annual Physical.   There are no Patient Instructions on file for this visit. EMR Dragon/transcription disclaimer:Significant part of this  encounter note is electronic transcription/translation of spoken language to printed text. The electronic translation of spoken language may be erroneous, or at times, nonsensical words or phrases may be inadvertently transcribed.  Although I have reviewed the note for such errors, some may still exist.

## 2017-11-10 ENCOUNTER — TELEPHONE (OUTPATIENT)
Dept: GASTROENTEROLOGY | Facility: HOSPITAL | Age: 72
End: 2017-11-10

## 2017-11-22 ENCOUNTER — OFFICE VISIT (OUTPATIENT)
Dept: INTERNAL MEDICINE | Age: 72
End: 2017-11-22
Payer: MEDICARE

## 2017-11-22 VITALS
WEIGHT: 145 LBS | DIASTOLIC BLOOD PRESSURE: 65 MMHG | HEART RATE: 81 BPM | OXYGEN SATURATION: 96 % | RESPIRATION RATE: 18 BRPM | BODY MASS INDEX: 24.16 KG/M2 | HEIGHT: 65 IN | SYSTOLIC BLOOD PRESSURE: 100 MMHG

## 2017-11-22 DIAGNOSIS — L60.8 NAIL DISCOLORATION: ICD-10-CM

## 2017-11-22 DIAGNOSIS — G47.00 PERSISTENT INSOMNIA: Primary | ICD-10-CM

## 2017-11-22 DIAGNOSIS — F41.1 GENERALIZED ANXIETY DISORDER: ICD-10-CM

## 2017-11-22 DIAGNOSIS — R19.7 DIARRHEA, UNSPECIFIED TYPE: ICD-10-CM

## 2017-11-22 PROBLEM — L03.213 PERIORBITAL CELLULITIS OF RIGHT EYE: Status: RESOLVED | Noted: 2017-08-10 | Resolved: 2017-11-22

## 2017-11-22 PROCEDURE — G8420 CALC BMI NORM PARAMETERS: HCPCS | Performed by: INTERNAL MEDICINE

## 2017-11-22 PROCEDURE — G8400 PT W/DXA NO RESULTS DOC: HCPCS | Performed by: INTERNAL MEDICINE

## 2017-11-22 PROCEDURE — 1090F PRES/ABSN URINE INCON ASSESS: CPT | Performed by: INTERNAL MEDICINE

## 2017-11-22 PROCEDURE — 99213 OFFICE O/P EST LOW 20 MIN: CPT | Performed by: INTERNAL MEDICINE

## 2017-11-22 PROCEDURE — 3014F SCREEN MAMMO DOC REV: CPT | Performed by: INTERNAL MEDICINE

## 2017-11-22 PROCEDURE — G8598 ASA/ANTIPLAT THER USED: HCPCS | Performed by: INTERNAL MEDICINE

## 2017-11-22 PROCEDURE — 1036F TOBACCO NON-USER: CPT | Performed by: INTERNAL MEDICINE

## 2017-11-22 PROCEDURE — 1123F ACP DISCUSS/DSCN MKR DOCD: CPT | Performed by: INTERNAL MEDICINE

## 2017-11-22 PROCEDURE — 4040F PNEUMOC VAC/ADMIN/RCVD: CPT | Performed by: INTERNAL MEDICINE

## 2017-11-22 PROCEDURE — G8484 FLU IMMUNIZE NO ADMIN: HCPCS | Performed by: INTERNAL MEDICINE

## 2017-11-22 PROCEDURE — G8427 DOCREV CUR MEDS BY ELIG CLIN: HCPCS | Performed by: INTERNAL MEDICINE

## 2017-11-22 PROCEDURE — 3017F COLORECTAL CA SCREEN DOC REV: CPT | Performed by: INTERNAL MEDICINE

## 2017-11-22 ASSESSMENT — ENCOUNTER SYMPTOMS
SORE THROAT: 0
ABDOMINAL PAIN: 0
COLOR CHANGE: 1
DIARRHEA: 1
CONSTIPATION: 0
COUGH: 0
CHEST TIGHTNESS: 0
WHEEZING: 0

## 2018-01-04 DIAGNOSIS — Z00.00 MEDICARE ANNUAL WELLNESS VISIT, SUBSEQUENT: ICD-10-CM

## 2018-01-04 DIAGNOSIS — R73.01 IMPAIRED FASTING GLUCOSE: ICD-10-CM

## 2018-01-04 DIAGNOSIS — E78.00 PURE HYPERCHOLESTEROLEMIA: ICD-10-CM

## 2018-01-04 DIAGNOSIS — E05.90 HYPERTHYROIDISM: ICD-10-CM

## 2018-01-04 DIAGNOSIS — E55.9 VITAMIN D DEFICIENCY: ICD-10-CM

## 2018-01-04 LAB
ALBUMIN SERPL-MCNC: 4.6 G/DL (ref 3.5–5.2)
ALP BLD-CCNC: 88 U/L (ref 35–104)
ALT SERPL-CCNC: 15 U/L (ref 5–33)
ANION GAP SERPL CALCULATED.3IONS-SCNC: 16 MMOL/L (ref 7–19)
AST SERPL-CCNC: 14 U/L (ref 5–32)
BACTERIA: NEGATIVE /HPF
BASOPHILS ABSOLUTE: 0.1 K/UL (ref 0–0.2)
BASOPHILS RELATIVE PERCENT: 1.2 % (ref 0–1)
BILIRUB SERPL-MCNC: 0.6 MG/DL (ref 0.2–1.2)
BILIRUBIN URINE: NEGATIVE
BLOOD, URINE: ABNORMAL
BUN BLDV-MCNC: 17 MG/DL (ref 8–23)
CALCIUM SERPL-MCNC: 9.6 MG/DL (ref 8.8–10.2)
CHLORIDE BLD-SCNC: 105 MMOL/L (ref 98–111)
CHOLESTEROL, TOTAL: 233 MG/DL (ref 160–199)
CLARITY: CLEAR
CO2: 24 MMOL/L (ref 22–29)
COLOR: YELLOW
CREAT SERPL-MCNC: 0.7 MG/DL (ref 0.5–0.9)
EOSINOPHILS ABSOLUTE: 0.1 K/UL (ref 0–0.6)
EOSINOPHILS RELATIVE PERCENT: 1.5 % (ref 0–5)
EPITHELIAL CELLS, UA: 4 /HPF (ref 0–5)
GFR NON-AFRICAN AMERICAN: >60
GLUCOSE BLD-MCNC: 107 MG/DL (ref 74–109)
GLUCOSE URINE: NEGATIVE MG/DL
HBA1C MFR BLD: 5.7 %
HCT VFR BLD CALC: 45 % (ref 37–47)
HDLC SERPL-MCNC: 55 MG/DL (ref 65–121)
HEMOGLOBIN: 14.7 G/DL (ref 12–16)
HYALINE CASTS: 1 /HPF (ref 0–8)
KETONES, URINE: NEGATIVE MG/DL
LDL CHOLESTEROL CALCULATED: 158 MG/DL
LEUKOCYTE ESTERASE, URINE: NEGATIVE
LYMPHOCYTES ABSOLUTE: 2.6 K/UL (ref 1.1–4.5)
LYMPHOCYTES RELATIVE PERCENT: 35.4 % (ref 20–40)
MCH RBC QN AUTO: 29.1 PG (ref 27–31)
MCHC RBC AUTO-ENTMCNC: 32.7 G/DL (ref 33–37)
MCV RBC AUTO: 88.9 FL (ref 81–99)
MONOCYTES ABSOLUTE: 0.5 K/UL (ref 0–0.9)
MONOCYTES RELATIVE PERCENT: 6.7 % (ref 0–10)
NEUTROPHILS ABSOLUTE: 4.1 K/UL (ref 1.5–7.5)
NEUTROPHILS RELATIVE PERCENT: 54.9 % (ref 50–65)
NITRITE, URINE: NEGATIVE
PDW BLD-RTO: 13.7 % (ref 11.5–14.5)
PH UA: 6
PLATELET # BLD: 227 K/UL (ref 130–400)
PMV BLD AUTO: 10.9 FL (ref 9.4–12.3)
POTASSIUM SERPL-SCNC: 4.1 MMOL/L (ref 3.5–5)
PROTEIN UA: NEGATIVE MG/DL
RBC # BLD: 5.06 M/UL (ref 4.2–5.4)
RBC UA: 2 /HPF (ref 0–4)
SODIUM BLD-SCNC: 145 MMOL/L (ref 136–145)
SPECIFIC GRAVITY UA: 1.02
T4 FREE: 1.1 NG/DL (ref 0.9–1.7)
TOTAL PROTEIN: 7 G/DL (ref 6.6–8.7)
TRIGL SERPL-MCNC: 99 MG/DL (ref 0–149)
TSH SERPL DL<=0.05 MIU/L-ACNC: 2.85 UIU/ML (ref 0.27–4.2)
UROBILINOGEN, URINE: 0.2 E.U./DL
VITAMIN D 25-HYDROXY: 22.6 NG/ML
WBC # BLD: 7.5 K/UL (ref 4.8–10.8)
WBC UA: 4 /HPF (ref 0–5)

## 2018-01-05 ENCOUNTER — OFFICE VISIT (OUTPATIENT)
Dept: INTERNAL MEDICINE | Age: 73
End: 2018-01-05
Payer: MEDICARE

## 2018-01-05 VITALS
OXYGEN SATURATION: 94 % | WEIGHT: 149.2 LBS | HEIGHT: 65 IN | SYSTOLIC BLOOD PRESSURE: 118 MMHG | HEART RATE: 78 BPM | DIASTOLIC BLOOD PRESSURE: 72 MMHG | BODY MASS INDEX: 24.86 KG/M2

## 2018-01-05 DIAGNOSIS — F41.1 GENERALIZED ANXIETY DISORDER: ICD-10-CM

## 2018-01-05 DIAGNOSIS — Z00.00 MEDICARE ANNUAL WELLNESS VISIT, SUBSEQUENT: Primary | ICD-10-CM

## 2018-01-05 DIAGNOSIS — M85.862 OSTEOPENIA OF BOTH LOWER LEGS: ICD-10-CM

## 2018-01-05 DIAGNOSIS — R73.01 IMPAIRED FASTING GLUCOSE: ICD-10-CM

## 2018-01-05 DIAGNOSIS — M85.861 OSTEOPENIA OF BOTH LOWER LEGS: ICD-10-CM

## 2018-01-05 DIAGNOSIS — G47.00 PERSISTENT INSOMNIA: ICD-10-CM

## 2018-01-05 DIAGNOSIS — E55.9 VITAMIN D DEFICIENCY: ICD-10-CM

## 2018-01-05 DIAGNOSIS — E78.00 PURE HYPERCHOLESTEROLEMIA: ICD-10-CM

## 2018-01-05 DIAGNOSIS — Z12.31 SCREENING MAMMOGRAM, ENCOUNTER FOR: ICD-10-CM

## 2018-01-05 PROCEDURE — 3014F SCREEN MAMMO DOC REV: CPT | Performed by: INTERNAL MEDICINE

## 2018-01-05 PROCEDURE — G8420 CALC BMI NORM PARAMETERS: HCPCS | Performed by: INTERNAL MEDICINE

## 2018-01-05 PROCEDURE — G8598 ASA/ANTIPLAT THER USED: HCPCS | Performed by: INTERNAL MEDICINE

## 2018-01-05 PROCEDURE — G8427 DOCREV CUR MEDS BY ELIG CLIN: HCPCS | Performed by: INTERNAL MEDICINE

## 2018-01-05 PROCEDURE — 1090F PRES/ABSN URINE INCON ASSESS: CPT | Performed by: INTERNAL MEDICINE

## 2018-01-05 PROCEDURE — 4040F PNEUMOC VAC/ADMIN/RCVD: CPT | Performed by: INTERNAL MEDICINE

## 2018-01-05 PROCEDURE — 1123F ACP DISCUSS/DSCN MKR DOCD: CPT | Performed by: INTERNAL MEDICINE

## 2018-01-05 PROCEDURE — G8484 FLU IMMUNIZE NO ADMIN: HCPCS | Performed by: INTERNAL MEDICINE

## 2018-01-05 PROCEDURE — 3017F COLORECTAL CA SCREEN DOC REV: CPT | Performed by: INTERNAL MEDICINE

## 2018-01-05 PROCEDURE — G0439 PPPS, SUBSEQ VISIT: HCPCS | Performed by: INTERNAL MEDICINE

## 2018-01-05 PROCEDURE — 1036F TOBACCO NON-USER: CPT | Performed by: INTERNAL MEDICINE

## 2018-01-05 PROCEDURE — 99214 OFFICE O/P EST MOD 30 MIN: CPT | Performed by: INTERNAL MEDICINE

## 2018-01-05 PROCEDURE — G8400 PT W/DXA NO RESULTS DOC: HCPCS | Performed by: INTERNAL MEDICINE

## 2018-01-05 RX ORDER — ERGOCALCIFEROL 1.25 MG/1
50000 CAPSULE ORAL WEEKLY
Qty: 4 CAPSULE | Refills: 3 | Status: SHIPPED | OUTPATIENT
Start: 2018-01-05 | End: 2018-05-15 | Stop reason: SDUPTHER

## 2018-01-05 RX ORDER — LORAZEPAM 0.5 MG/1
TABLET ORAL
Qty: 40 TABLET | Refills: 0 | Status: SHIPPED | OUTPATIENT
Start: 2018-01-05 | End: 2018-05-18 | Stop reason: SDUPTHER

## 2018-01-05 RX ORDER — ZALEPLON 10 MG/1
10 CAPSULE ORAL NIGHTLY
Qty: 30 CAPSULE | Refills: 5 | Status: SHIPPED | OUTPATIENT
Start: 2018-01-05 | End: 2018-02-04

## 2018-01-05 ASSESSMENT — ENCOUNTER SYMPTOMS
ABDOMINAL PAIN: 0
BLOOD IN STOOL: 0
EYE PAIN: 0
CHEST TIGHTNESS: 0
VOICE CHANGE: 0
EYE REDNESS: 0
CONSTIPATION: 0
NAUSEA: 0
COUGH: 0
SORE THROAT: 0
WHEEZING: 0
COLOR CHANGE: 0
SINUS PRESSURE: 0
VOMITING: 0
TROUBLE SWALLOWING: 0
DIARRHEA: 0

## 2018-01-05 ASSESSMENT — LIFESTYLE VARIABLES
HAS A RELATIVE, FRIEND, DOCTOR, OR ANOTHER HEALTH PROFESSIONAL EXPRESSED CONCERN ABOUT YOUR DRINKING OR SUGGESTED YOU CUT DOWN: 0
HOW OFTEN DURING THE LAST YEAR HAVE YOU HAD A FEELING OF GUILT OR REMORSE AFTER DRINKING: 0
AUDIT-C TOTAL SCORE: 1
HOW MANY STANDARD DRINKS CONTAINING ALCOHOL DO YOU HAVE ON A TYPICAL DAY: 0
HOW OFTEN DURING THE LAST YEAR HAVE YOU NEEDED AN ALCOHOLIC DRINK FIRST THING IN THE MORNING TO GET YOURSELF GOING AFTER A NIGHT OF HEAVY DRINKING: 0
HOW OFTEN DURING THE LAST YEAR HAVE YOU FAILED TO DO WHAT WAS NORMALLY EXPECTED FROM YOU BECAUSE OF DRINKING: 0
HOW OFTEN DO YOU HAVE SIX OR MORE DRINKS ON ONE OCCASION: 0
HAVE YOU OR SOMEONE ELSE BEEN INJURED AS A RESULT OF YOUR DRINKING: 0
HOW OFTEN DURING THE LAST YEAR HAVE YOU BEEN UNABLE TO REMEMBER WHAT HAPPENED THE NIGHT BEFORE BECAUSE YOU HAD BEEN DRINKING: 0
HOW OFTEN DURING THE LAST YEAR HAVE YOU FOUND THAT YOU WERE NOT ABLE TO STOP DRINKING ONCE YOU HAD STARTED: 0
HOW OFTEN DO YOU HAVE A DRINK CONTAINING ALCOHOL: 1
AUDIT TOTAL SCORE: 1

## 2018-01-05 ASSESSMENT — ANXIETY QUESTIONNAIRES: GAD7 TOTAL SCORE: 0

## 2018-01-05 ASSESSMENT — PATIENT HEALTH QUESTIONNAIRE - PHQ9: SUM OF ALL RESPONSES TO PHQ QUESTIONS 1-9: 0

## 2018-01-05 NOTE — PROGRESS NOTES
 Paroxysmal hypertension 08/10/2017    Peripheral neuropathy ([facility] [address] 08/10/2017    Urine incontinence 08/10/2017    Pure hypercholesterolemia 08/05/2017    Generalized anxiety disorder 08/05/2017    Vitamin D deficiency 08/05/2017    Persistent insomnia 08/05/2017    Impaired fasting glucose 08/05/2017    Carotid artery stenosis 08/05/2017     Rt carotid 50%; repeat 2014 internal ok/ external  At 50%      Osteopenia of lower leg 08/05/2017     12/15  Hip neck -1.9/ other nl      Fatigue 08/05/2017    Chronic vertigo 08/05/2017     All eval in past has been negative      Nontoxic multinodular goiter 08/05/2017    Special screening for malignant neoplasms, colon 08/05/2017     2017: appt was cancelled      OA (osteoarthritis) of knee 08/05/2017       Past Medical History:   Diagnosis Date    ADD (attention deficit disorder)     Carotid atherosclerosis     Degeneration of cervical intervertebral disc     Mixed hyperlipidemia     Nontoxic multinodular goiter     OA (osteoarthritis)     Osteopenia     Panic disorder without agoraphobia     Peripheral neuropathy ([facility] [address]     Pure hypercholesterolemia     Urinary incontinence     Vitamin D deficiency        Past Surgical History:   Procedure Laterality Date    COLONOSCOPY      HYSTERECTOMY, TOTAL ABDOMINAL      w/removal of both ovaries    JOINT REPLACEMENT      knee    TOTAL KNEE ARTHROPLASTY Right 2011    TOTAL KNEE ARTHROPLASTY Left 2012    VAGINA SURGERY      Abdomino-Vaginal Vesical Neck Suspension       Current Outpatient Prescriptions   Medication Sig Dispense Refill    LORazepam (ATIVAN) 0.5 MG tablet Take twice a day as needed for anxiety. 40 tablet 0    zaleplon (SONATA) 10 MG capsule Take 1 capsule by mouth nightly for 30 days. 30 capsule 5    vitamin D (ERGOCALCIFEROL) 32184 units CAPS capsule Take 1 capsule by mouth once a week 4 capsule 3    tretinoin (RETIN-A) 0.1 % cream Apply topically nightly.  20 g 1    cetirizine JVD present. No thyromegaly present. Cardiovascular: Normal rate, regular rhythm and normal heart sounds. No murmur heard. Pulmonary/Chest: Effort normal and breath sounds normal. No stridor. No respiratory distress. She exhibits no tenderness. Abdominal: Soft. Bowel sounds are normal. She exhibits no distension and no mass. There is no tenderness. Genitourinary: No breast swelling, tenderness, discharge or bleeding. Pelvic exam was performed with patient supine. Genitourinary Comments: Breasts bilaterally symmetric/ no nipple discharge  No axillary lymphadenopathy   Musculoskeletal: She exhibits no edema or tenderness. Lymphadenopathy:     She has no cervical adenopathy. Neurological: She is alert and oriented to person, place, and time. No cranial nerve deficit. Coordination normal.   Skin: Skin is warm. No rash noted. No erythema. Right common fingernail discoloration, slightly raised   Psychiatric: She has a normal mood and affect. Her behavior is normal.         ASSESSMENT/PLAN    Pure hypercholesterolemia-Her LDL is elevated 158, previously was 164 and 168. Patient unable to tolerate any statin's    Impaired fasting glucose-her hemoglobin A1c stable at 5.7, diet lower in free sugars is recommended    Vitamin D deficiency-her vitamin D level is low    Persistent insomnia-refill Sonata    Generalized anxiety disorder  Refill Ativan for when necessary use only, #40 usually lasts her about 3-4 months  Amanda Franks was reviewed  On exam today and as per discussions with the patient today there is no evidence of adverse events such as cognitive impairment, sedation, constipation or falls related to prescribed medications. There is also no evidence of aberrant behavior like lost prescriptions or early refill requests or multiple prescribers for controlled substances.     Patient  was advised NOT to attempt to drive a motor vehichle or operate any heavy machinery within 6 hrs of taking the presribed

## 2018-01-29 ENCOUNTER — TELEPHONE (OUTPATIENT)
Dept: GASTROENTEROLOGY | Facility: CLINIC | Age: 73
End: 2018-01-29

## 2018-04-12 PROBLEM — Z00.00 MEDICARE ANNUAL WELLNESS VISIT, SUBSEQUENT: Status: RESOLVED | Noted: 2017-10-31 | Resolved: 2018-04-12

## 2018-04-13 ENCOUNTER — APPOINTMENT (OUTPATIENT)
Dept: GENERAL RADIOLOGY | Facility: HOSPITAL | Age: 73
End: 2018-04-13

## 2018-04-13 ENCOUNTER — HOSPITAL ENCOUNTER (EMERGENCY)
Facility: HOSPITAL | Age: 73
Discharge: HOME | End: 2018-04-13
Admitting: EMERGENCY MEDICINE

## 2018-04-13 VITALS
RESPIRATION RATE: 16 BRPM | SYSTOLIC BLOOD PRESSURE: 146 MMHG | BODY MASS INDEX: 23.32 KG/M2 | DIASTOLIC BLOOD PRESSURE: 74 MMHG | WEIGHT: 140 LBS | OXYGEN SATURATION: 97 % | HEART RATE: 67 BPM | HEIGHT: 65 IN | TEMPERATURE: 98.9 F

## 2018-04-13 DIAGNOSIS — K52.9 POSTPRANDIAL DIARRHEA: ICD-10-CM

## 2018-04-13 DIAGNOSIS — R53.83 FATIGUE, UNSPECIFIED TYPE: Primary | ICD-10-CM

## 2018-04-13 LAB
ALBUMIN SERPL-MCNC: 4.3 G/DL (ref 3.5–5)
ALBUMIN/GLOB SERPL: 1.7 G/DL (ref 1.1–2.5)
ALP SERPL-CCNC: 79 U/L (ref 24–120)
ALT SERPL W P-5'-P-CCNC: 29 U/L (ref 0–54)
ANION GAP SERPL CALCULATED.3IONS-SCNC: 12 MMOL/L (ref 4–13)
AST SERPL-CCNC: 22 U/L (ref 7–45)
BACTERIA UR QL AUTO: ABNORMAL /HPF
BASOPHILS # BLD AUTO: 0.06 10*3/MM3 (ref 0–0.2)
BASOPHILS NFR BLD AUTO: 1.1 % (ref 0–2)
BILIRUB SERPL-MCNC: 0.9 MG/DL (ref 0.1–1)
BILIRUB UR QL STRIP: NEGATIVE
BUN BLD-MCNC: 11 MG/DL (ref 5–21)
BUN/CREAT SERPL: 15.9 (ref 7–25)
CALCIUM SPEC-SCNC: 9.8 MG/DL (ref 8.4–10.4)
CHLORIDE SERPL-SCNC: 105 MMOL/L (ref 98–110)
CLARITY UR: CLEAR
CO2 SERPL-SCNC: 25 MMOL/L (ref 24–31)
COLOR UR: YELLOW
CREAT BLD-MCNC: 0.69 MG/DL (ref 0.5–1.4)
DEPRECATED RDW RBC AUTO: 40 FL (ref 40–54)
EOSINOPHIL # BLD AUTO: 0.1 10*3/MM3 (ref 0–0.7)
EOSINOPHIL NFR BLD AUTO: 1.8 % (ref 0–4)
ERYTHROCYTE [DISTWIDTH] IN BLOOD BY AUTOMATED COUNT: 12.9 % (ref 12–15)
GFR SERPL CREATININE-BSD FRML MDRD: 83 ML/MIN/1.73
GLOBULIN UR ELPH-MCNC: 2.6 GM/DL
GLUCOSE BLD-MCNC: 106 MG/DL (ref 70–100)
GLUCOSE UR STRIP-MCNC: NEGATIVE MG/DL
HCT VFR BLD AUTO: 42.3 % (ref 37–47)
HGB BLD-MCNC: 14.3 G/DL (ref 12–16)
HGB UR QL STRIP.AUTO: ABNORMAL
HOLD SPECIMEN: NORMAL
HOLD SPECIMEN: NORMAL
HYALINE CASTS UR QL AUTO: ABNORMAL /LPF
IMM GRANULOCYTES # BLD: 0.01 10*3/MM3 (ref 0–0.03)
IMM GRANULOCYTES NFR BLD: 0.2 % (ref 0–5)
KETONES UR QL STRIP: NEGATIVE
LEUKOCYTE ESTERASE UR QL STRIP.AUTO: NEGATIVE
LIPASE SERPL-CCNC: 62 U/L (ref 23–203)
LYMPHOCYTES # BLD AUTO: 1.85 10*3/MM3 (ref 0.72–4.86)
LYMPHOCYTES NFR BLD AUTO: 33.3 % (ref 15–45)
MCH RBC QN AUTO: 28.9 PG (ref 28–32)
MCHC RBC AUTO-ENTMCNC: 33.8 G/DL (ref 33–36)
MCV RBC AUTO: 85.5 FL (ref 82–98)
MONOCYTES # BLD AUTO: 0.43 10*3/MM3 (ref 0.19–1.3)
MONOCYTES NFR BLD AUTO: 7.7 % (ref 4–12)
NEUTROPHILS # BLD AUTO: 3.11 10*3/MM3 (ref 1.87–8.4)
NEUTROPHILS NFR BLD AUTO: 55.9 % (ref 39–78)
NITRITE UR QL STRIP: NEGATIVE
NRBC BLD MANUAL-RTO: 0 /100 WBC (ref 0–0)
PH UR STRIP.AUTO: 6 [PH] (ref 5–8)
PLATELET # BLD AUTO: 235 10*3/MM3 (ref 130–400)
PMV BLD AUTO: 11.4 FL (ref 6–12)
POTASSIUM BLD-SCNC: 4.1 MMOL/L (ref 3.5–5.3)
PROT SERPL-MCNC: 6.9 G/DL (ref 6.3–8.7)
PROT UR QL STRIP: NEGATIVE
RBC # BLD AUTO: 4.95 10*6/MM3 (ref 4.2–5.4)
RBC # UR: ABNORMAL /HPF
REF LAB TEST METHOD: ABNORMAL
SODIUM BLD-SCNC: 142 MMOL/L (ref 135–145)
SP GR UR STRIP: 1.01 (ref 1–1.03)
SQUAMOUS #/AREA URNS HPF: ABNORMAL /HPF
TROPONIN I SERPL-MCNC: <0.012 NG/ML (ref 0–0.03)
UROBILINOGEN UR QL STRIP: ABNORMAL
WBC NRBC COR # BLD: 5.56 10*3/MM3 (ref 4.8–10.8)
WBC UR QL AUTO: ABNORMAL /HPF
WHOLE BLOOD HOLD SPECIMEN: NORMAL
WHOLE BLOOD HOLD SPECIMEN: NORMAL

## 2018-04-13 PROCEDURE — 99283 EMERGENCY DEPT VISIT LOW MDM: CPT

## 2018-04-13 PROCEDURE — 84484 ASSAY OF TROPONIN QUANT: CPT | Performed by: NURSE PRACTITIONER

## 2018-04-13 PROCEDURE — 81001 URINALYSIS AUTO W/SCOPE: CPT | Performed by: NURSE PRACTITIONER

## 2018-04-13 PROCEDURE — 93005 ELECTROCARDIOGRAM TRACING: CPT | Performed by: NURSE PRACTITIONER

## 2018-04-13 PROCEDURE — 85025 COMPLETE CBC W/AUTO DIFF WBC: CPT | Performed by: NURSE PRACTITIONER

## 2018-04-13 PROCEDURE — 74022 RADEX COMPL AQT ABD SERIES: CPT

## 2018-04-13 PROCEDURE — 93010 ELECTROCARDIOGRAM REPORT: CPT | Performed by: INTERNAL MEDICINE

## 2018-04-13 PROCEDURE — 83690 ASSAY OF LIPASE: CPT | Performed by: NURSE PRACTITIONER

## 2018-04-13 PROCEDURE — 80053 COMPREHEN METABOLIC PANEL: CPT | Performed by: NURSE PRACTITIONER

## 2018-04-13 RX ORDER — SODIUM CHLORIDE 0.9 % (FLUSH) 0.9 %
10 SYRINGE (ML) INJECTION AS NEEDED
Status: DISCONTINUED | OUTPATIENT
Start: 2018-04-13 | End: 2018-04-13 | Stop reason: HOSPADM

## 2018-04-26 ENCOUNTER — TRANSCRIBE ORDERS (OUTPATIENT)
Dept: ADMINISTRATIVE | Facility: HOSPITAL | Age: 73
End: 2018-04-26

## 2018-04-26 ENCOUNTER — LAB (OUTPATIENT)
Dept: LAB | Facility: HOSPITAL | Age: 73
End: 2018-04-26

## 2018-04-26 DIAGNOSIS — L60.1 ONYCHOLYSIS: ICD-10-CM

## 2018-04-26 DIAGNOSIS — L60.1 ONYCHOLYSIS: Primary | ICD-10-CM

## 2018-04-26 PROCEDURE — 87102 FUNGUS ISOLATION CULTURE: CPT | Performed by: NURSE PRACTITIONER

## 2018-04-27 DIAGNOSIS — E78.00 PURE HYPERCHOLESTEROLEMIA: ICD-10-CM

## 2018-04-27 DIAGNOSIS — R73.01 IMPAIRED FASTING GLUCOSE: ICD-10-CM

## 2018-04-27 DIAGNOSIS — E55.9 VITAMIN D DEFICIENCY: ICD-10-CM

## 2018-04-27 LAB
ALBUMIN SERPL-MCNC: 4.6 G/DL (ref 3.5–5.2)
ALP BLD-CCNC: 94 U/L (ref 35–104)
ALT SERPL-CCNC: 18 U/L (ref 5–33)
ANION GAP SERPL CALCULATED.3IONS-SCNC: 15 MMOL/L (ref 7–19)
AST SERPL-CCNC: 15 U/L (ref 5–32)
BILIRUB SERPL-MCNC: 0.6 MG/DL (ref 0.2–1.2)
BUN BLDV-MCNC: 15 MG/DL (ref 8–23)
CALCIUM SERPL-MCNC: 10 MG/DL (ref 8.8–10.2)
CHLORIDE BLD-SCNC: 102 MMOL/L (ref 98–111)
CHOLESTEROL, TOTAL: 251 MG/DL (ref 160–199)
CO2: 26 MMOL/L (ref 22–29)
CREAT SERPL-MCNC: 0.6 MG/DL (ref 0.5–0.9)
GFR NON-AFRICAN AMERICAN: >60
GLUCOSE BLD-MCNC: 93 MG/DL (ref 74–109)
HBA1C MFR BLD: 5.7 %
HDLC SERPL-MCNC: 53 MG/DL (ref 65–121)
LDL CHOLESTEROL CALCULATED: 143 MG/DL
POTASSIUM SERPL-SCNC: 4.1 MMOL/L (ref 3.5–5)
SODIUM BLD-SCNC: 143 MMOL/L (ref 136–145)
TOTAL PROTEIN: 7.1 G/DL (ref 6.6–8.7)
TRIGL SERPL-MCNC: 274 MG/DL (ref 0–149)
VITAMIN D 25-HYDROXY: 22.8 NG/ML

## 2018-05-06 ENCOUNTER — APPOINTMENT (OUTPATIENT)
Dept: CT IMAGING | Facility: HOSPITAL | Age: 73
End: 2018-05-06

## 2018-05-06 ENCOUNTER — HOSPITAL ENCOUNTER (EMERGENCY)
Facility: HOSPITAL | Age: 73
Discharge: HOME | End: 2018-05-06
Attending: EMERGENCY MEDICINE | Admitting: EMERGENCY MEDICINE

## 2018-05-06 ENCOUNTER — APPOINTMENT (OUTPATIENT)
Dept: GENERAL RADIOLOGY | Facility: HOSPITAL | Age: 73
End: 2018-05-06

## 2018-05-06 VITALS
HEIGHT: 65 IN | BODY MASS INDEX: 26.41 KG/M2 | TEMPERATURE: 96.9 F | OXYGEN SATURATION: 98 % | SYSTOLIC BLOOD PRESSURE: 137 MMHG | WEIGHT: 158.5 LBS | DIASTOLIC BLOOD PRESSURE: 72 MMHG | HEART RATE: 70 BPM | RESPIRATION RATE: 15 BRPM

## 2018-05-06 DIAGNOSIS — R42 DIZZINESS: Primary | ICD-10-CM

## 2018-05-06 LAB
ALBUMIN SERPL-MCNC: 4 G/DL (ref 3.5–5)
ALBUMIN/GLOB SERPL: 1.5 G/DL (ref 1.1–2.5)
ALP SERPL-CCNC: 71 U/L (ref 24–120)
ALT SERPL W P-5'-P-CCNC: 18 U/L (ref 0–54)
ANION GAP SERPL CALCULATED.3IONS-SCNC: 13 MMOL/L (ref 4–13)
AST SERPL-CCNC: 22 U/L (ref 7–45)
BACTERIA UR QL AUTO: ABNORMAL /HPF
BASOPHILS # BLD AUTO: 0.06 10*3/MM3 (ref 0–0.2)
BASOPHILS NFR BLD AUTO: 1 % (ref 0–2)
BILIRUB SERPL-MCNC: 0.8 MG/DL (ref 0.1–1)
BILIRUB UR QL STRIP: NEGATIVE
BUN BLD-MCNC: 13 MG/DL (ref 5–21)
BUN/CREAT SERPL: 19.1 (ref 7–25)
CALCIUM SPEC-SCNC: 9.5 MG/DL (ref 8.4–10.4)
CHLORIDE SERPL-SCNC: 104 MMOL/L (ref 98–110)
CLARITY UR: CLEAR
CO2 SERPL-SCNC: 23 MMOL/L (ref 24–31)
COLOR UR: YELLOW
CREAT BLD-MCNC: 0.68 MG/DL (ref 0.5–1.4)
DEPRECATED RDW RBC AUTO: 40 FL (ref 40–54)
EOSINOPHIL # BLD AUTO: 0.07 10*3/MM3 (ref 0–0.7)
EOSINOPHIL NFR BLD AUTO: 1.2 % (ref 0–4)
ERYTHROCYTE [DISTWIDTH] IN BLOOD BY AUTOMATED COUNT: 12.8 % (ref 12–15)
GFR SERPL CREATININE-BSD FRML MDRD: 85 ML/MIN/1.73
GLOBULIN UR ELPH-MCNC: 2.6 GM/DL
GLUCOSE BLD-MCNC: 198 MG/DL (ref 70–100)
GLUCOSE UR STRIP-MCNC: NEGATIVE MG/DL
HCT VFR BLD AUTO: 41 % (ref 37–47)
HGB BLD-MCNC: 14 G/DL (ref 12–16)
HGB UR QL STRIP.AUTO: ABNORMAL
HOLD SPECIMEN: NORMAL
HOLD SPECIMEN: NORMAL
HYALINE CASTS UR QL AUTO: ABNORMAL /LPF
IMM GRANULOCYTES # BLD: 0.04 10*3/MM3 (ref 0–0.03)
IMM GRANULOCYTES NFR BLD: 0.7 % (ref 0–5)
KETONES UR QL STRIP: NEGATIVE
LEUKOCYTE ESTERASE UR QL STRIP.AUTO: ABNORMAL
LYMPHOCYTES # BLD AUTO: 1.92 10*3/MM3 (ref 0.72–4.86)
LYMPHOCYTES NFR BLD AUTO: 32.1 % (ref 15–45)
MCH RBC QN AUTO: 29.1 PG (ref 28–32)
MCHC RBC AUTO-ENTMCNC: 34.1 G/DL (ref 33–36)
MCV RBC AUTO: 85.2 FL (ref 82–98)
MONOCYTES # BLD AUTO: 0.34 10*3/MM3 (ref 0.19–1.3)
MONOCYTES NFR BLD AUTO: 5.7 % (ref 4–12)
NEUTROPHILS # BLD AUTO: 3.56 10*3/MM3 (ref 1.87–8.4)
NEUTROPHILS NFR BLD AUTO: 59.3 % (ref 39–78)
NITRITE UR QL STRIP: NEGATIVE
NRBC BLD MANUAL-RTO: 0 /100 WBC (ref 0–0)
PH UR STRIP.AUTO: 6.5 [PH] (ref 5–8)
PLATELET # BLD AUTO: 228 10*3/MM3 (ref 130–400)
PMV BLD AUTO: 11.3 FL (ref 6–12)
POTASSIUM BLD-SCNC: 3.4 MMOL/L (ref 3.5–5.3)
PROT SERPL-MCNC: 6.6 G/DL (ref 6.3–8.7)
PROT UR QL STRIP: NEGATIVE
RBC # BLD AUTO: 4.81 10*6/MM3 (ref 4.2–5.4)
RBC # UR: ABNORMAL /HPF
REF LAB TEST METHOD: ABNORMAL
SODIUM BLD-SCNC: 140 MMOL/L (ref 135–145)
SP GR UR STRIP: 1.01 (ref 1–1.03)
SQUAMOUS #/AREA URNS HPF: ABNORMAL /HPF
TROPONIN I SERPL-MCNC: <0.012 NG/ML (ref 0–0.03)
UROBILINOGEN UR QL STRIP: ABNORMAL
WBC NRBC COR # BLD: 5.99 10*3/MM3 (ref 4.8–10.8)
WBC UR QL AUTO: ABNORMAL /HPF
WHOLE BLOOD HOLD SPECIMEN: NORMAL
WHOLE BLOOD HOLD SPECIMEN: NORMAL

## 2018-05-06 PROCEDURE — 93005 ELECTROCARDIOGRAM TRACING: CPT

## 2018-05-06 PROCEDURE — 70450 CT HEAD/BRAIN W/O DYE: CPT

## 2018-05-06 PROCEDURE — 99285 EMERGENCY DEPT VISIT HI MDM: CPT

## 2018-05-06 PROCEDURE — 85025 COMPLETE CBC W/AUTO DIFF WBC: CPT | Performed by: EMERGENCY MEDICINE

## 2018-05-06 PROCEDURE — 81001 URINALYSIS AUTO W/SCOPE: CPT | Performed by: EMERGENCY MEDICINE

## 2018-05-06 PROCEDURE — 80053 COMPREHEN METABOLIC PANEL: CPT | Performed by: EMERGENCY MEDICINE

## 2018-05-06 PROCEDURE — 84484 ASSAY OF TROPONIN QUANT: CPT | Performed by: EMERGENCY MEDICINE

## 2018-05-06 PROCEDURE — 71045 X-RAY EXAM CHEST 1 VIEW: CPT

## 2018-05-06 PROCEDURE — 93010 ELECTROCARDIOGRAM REPORT: CPT | Performed by: INTERNAL MEDICINE

## 2018-05-06 PROCEDURE — 93005 ELECTROCARDIOGRAM TRACING: CPT | Performed by: EMERGENCY MEDICINE

## 2018-05-06 RX ORDER — SODIUM CHLORIDE 0.9 % (FLUSH) 0.9 %
10 SYRINGE (ML) INJECTION AS NEEDED
Status: DISCONTINUED | OUTPATIENT
Start: 2018-05-06 | End: 2018-05-06 | Stop reason: HOSPADM

## 2018-05-06 RX ORDER — MECLIZINE HYDROCHLORIDE 25 MG/1
25 TABLET ORAL 3 TIMES DAILY PRN
Qty: 20 TABLET | Refills: 0 | Status: SHIPPED | OUTPATIENT
Start: 2018-05-06

## 2018-05-06 RX ORDER — MECLIZINE HYDROCHLORIDE 25 MG/1
25 TABLET ORAL ONCE
Status: COMPLETED | OUTPATIENT
Start: 2018-05-06 | End: 2018-05-06

## 2018-05-06 RX ADMIN — MECLIZINE HYDROCHLORIDE 25 MG: 25 TABLET ORAL at 13:41

## 2018-05-07 ENCOUNTER — HOSPITAL ENCOUNTER (OUTPATIENT)
Facility: HOSPITAL | Age: 73
Setting detail: OBSERVATION
LOS: 1 days | Discharge: HOME | End: 2018-05-08
Attending: FAMILY MEDICINE | Admitting: FAMILY MEDICINE

## 2018-05-07 ENCOUNTER — APPOINTMENT (OUTPATIENT)
Dept: GENERAL RADIOLOGY | Facility: HOSPITAL | Age: 73
End: 2018-05-07

## 2018-05-07 ENCOUNTER — APPOINTMENT (OUTPATIENT)
Dept: MRI IMAGING | Facility: HOSPITAL | Age: 73
End: 2018-05-07

## 2018-05-07 ENCOUNTER — APPOINTMENT (OUTPATIENT)
Dept: ULTRASOUND IMAGING | Facility: HOSPITAL | Age: 73
End: 2018-05-07

## 2018-05-07 ENCOUNTER — APPOINTMENT (OUTPATIENT)
Dept: CARDIOLOGY | Facility: HOSPITAL | Age: 73
End: 2018-05-07
Attending: FAMILY MEDICINE

## 2018-05-07 DIAGNOSIS — R42 DIZZINESS: ICD-10-CM

## 2018-05-07 DIAGNOSIS — Z74.09 IMPAIRED MOBILITY AND ADLS: ICD-10-CM

## 2018-05-07 DIAGNOSIS — Z78.9 IMPAIRED MOBILITY AND ADLS: ICD-10-CM

## 2018-05-07 DIAGNOSIS — I63.9 CEREBROVASCULAR ACCIDENT (CVA), UNSPECIFIED MECHANISM (HCC): Primary | ICD-10-CM

## 2018-05-07 DIAGNOSIS — R41.89 IMPAIRED COGNITION: ICD-10-CM

## 2018-05-07 DIAGNOSIS — Z74.09 IMPAIRED MOBILITY: ICD-10-CM

## 2018-05-07 LAB
ALBUMIN SERPL-MCNC: 4.1 G/DL (ref 3.5–5)
ALBUMIN/GLOB SERPL: 1.6 G/DL (ref 1.1–2.5)
ALP SERPL-CCNC: 82 U/L (ref 24–120)
ALT SERPL W P-5'-P-CCNC: 19 U/L (ref 0–54)
AMPHET+METHAMPHET UR QL: NEGATIVE
ANION GAP SERPL CALCULATED.3IONS-SCNC: 10 MMOL/L (ref 4–13)
AST SERPL-CCNC: 18 U/L (ref 7–45)
BACTERIA UR QL AUTO: ABNORMAL /HPF
BARBITURATES UR QL SCN: NEGATIVE
BASOPHILS # BLD AUTO: 0.06 10*3/MM3 (ref 0–0.2)
BASOPHILS NFR BLD AUTO: 1 % (ref 0–2)
BENZODIAZ UR QL SCN: NEGATIVE
BH CV ECHO MEAS - AO MAX PG (FULL): 2.6 MMHG
BH CV ECHO MEAS - AO MAX PG: 7.7 MMHG
BH CV ECHO MEAS - AO MEAN PG (FULL): 1 MMHG
BH CV ECHO MEAS - AO MEAN PG: 4 MMHG
BH CV ECHO MEAS - AO ROOT AREA (BSA CORRECTED): 1.9
BH CV ECHO MEAS - AO ROOT AREA: 8 CM^2
BH CV ECHO MEAS - AO ROOT DIAM: 3.2 CM
BH CV ECHO MEAS - AO V2 MAX: 139 CM/SEC
BH CV ECHO MEAS - AO V2 MEAN: 92.5 CM/SEC
BH CV ECHO MEAS - AO V2 VTI: 27.2 CM
BH CV ECHO MEAS - AVA(I,A): 3.4 CM^2
BH CV ECHO MEAS - AVA(I,D): 3.4 CM^2
BH CV ECHO MEAS - AVA(V,A): 2.6 CM^2
BH CV ECHO MEAS - AVA(V,D): 2.6 CM^2
BH CV ECHO MEAS - BSA(HAYCOCK): 1.7 M^2
BH CV ECHO MEAS - BSA: 1.7 M^2
BH CV ECHO MEAS - BZI_BMI: 24.1 KILOGRAMS/M^2
BH CV ECHO MEAS - BZI_METRIC_HEIGHT: 165.1 CM
BH CV ECHO MEAS - BZI_METRIC_WEIGHT: 65.8 KG
BH CV ECHO MEAS - CONTRAST EF 4CH: 63.2 ML/M^2
BH CV ECHO MEAS - EDV(CUBED): 57.1 ML
BH CV ECHO MEAS - EDV(MOD-SP4): 36.7 ML
BH CV ECHO MEAS - EDV(TEICH): 63.9 ML
BH CV ECHO MEAS - EF(CUBED): 75.5 %
BH CV ECHO MEAS - EF(MOD-SP4): 63.2 %
BH CV ECHO MEAS - EF(TEICH): 68.1 %
BH CV ECHO MEAS - ESV(CUBED): 14 ML
BH CV ECHO MEAS - ESV(MOD-SP4): 13.5 ML
BH CV ECHO MEAS - ESV(TEICH): 20.4 ML
BH CV ECHO MEAS - FS: 37.4 %
BH CV ECHO MEAS - IVS/LVPW: 0.81
BH CV ECHO MEAS - IVSD: 0.85 CM
BH CV ECHO MEAS - LA DIMENSION: 3.5 CM
BH CV ECHO MEAS - LA/AO: 1.1
BH CV ECHO MEAS - LAT PEAK E' VEL: 6.4 CM/SEC
BH CV ECHO MEAS - LV DIASTOLIC VOL/BSA (35-75): 21.3 ML/M^2
BH CV ECHO MEAS - LV MASS(C)D: 111.5 GRAMS
BH CV ECHO MEAS - LV MASS(C)DI: 64.6 GRAMS/M^2
BH CV ECHO MEAS - LV MAX PG: 5.1 MMHG
BH CV ECHO MEAS - LV MEAN PG: 3 MMHG
BH CV ECHO MEAS - LV SYSTOLIC VOL/BSA (12-30): 7.8 ML/M^2
BH CV ECHO MEAS - LV V1 MAX: 113 CM/SEC
BH CV ECHO MEAS - LV V1 MEAN: 79.2 CM/SEC
BH CV ECHO MEAS - LV V1 VTI: 29.5 CM
BH CV ECHO MEAS - LVIDD: 3.9 CM
BH CV ECHO MEAS - LVIDS: 2.4 CM
BH CV ECHO MEAS - LVLD AP4: 6.6 CM
BH CV ECHO MEAS - LVLS AP4: 4.8 CM
BH CV ECHO MEAS - LVOT AREA (M): 3.1 CM^2
BH CV ECHO MEAS - LVOT AREA: 3.1 CM^2
BH CV ECHO MEAS - LVOT DIAM: 2 CM
BH CV ECHO MEAS - LVPWD: 1.1 CM
BH CV ECHO MEAS - MV A MAX VEL: 113 CM/SEC
BH CV ECHO MEAS - MV DEC TIME: 0.24 SEC
BH CV ECHO MEAS - MV E MAX VEL: 78.1 CM/SEC
BH CV ECHO MEAS - MV E/A: 0.69
BH CV ECHO MEAS - SI(AO): 126.8 ML/M^2
BH CV ECHO MEAS - SI(CUBED): 25 ML/M^2
BH CV ECHO MEAS - SI(LVOT): 53.7 ML/M^2
BH CV ECHO MEAS - SI(MOD-SP4): 13.4 ML/M^2
BH CV ECHO MEAS - SI(TEICH): 25.2 ML/M^2
BH CV ECHO MEAS - SV(AO): 218.8 ML
BH CV ECHO MEAS - SV(CUBED): 43.1 ML
BH CV ECHO MEAS - SV(LVOT): 92.7 ML
BH CV ECHO MEAS - SV(MOD-SP4): 23.2 ML
BH CV ECHO MEAS - SV(TEICH): 43.5 ML
BILIRUB SERPL-MCNC: 0.8 MG/DL (ref 0.1–1)
BILIRUB UR QL STRIP: NEGATIVE
BUN BLD-MCNC: 11 MG/DL (ref 5–21)
BUN/CREAT SERPL: 16.4 (ref 7–25)
CALCIUM SPEC-SCNC: 9.6 MG/DL (ref 8.4–10.4)
CANNABINOIDS SERPL QL: NEGATIVE
CHLORIDE SERPL-SCNC: 107 MMOL/L (ref 98–110)
CLARITY UR: CLEAR
CO2 SERPL-SCNC: 27 MMOL/L (ref 24–31)
COCAINE UR QL: NEGATIVE
COLOR UR: YELLOW
CREAT BLD-MCNC: 0.67 MG/DL (ref 0.5–1.4)
DEPRECATED RDW RBC AUTO: 40 FL (ref 40–54)
EOSINOPHIL # BLD AUTO: 0.07 10*3/MM3 (ref 0–0.7)
EOSINOPHIL NFR BLD AUTO: 1.2 % (ref 0–4)
ERYTHROCYTE [DISTWIDTH] IN BLOOD BY AUTOMATED COUNT: 13 % (ref 12–15)
GFR SERPL CREATININE-BSD FRML MDRD: 86 ML/MIN/1.73
GLOBULIN UR ELPH-MCNC: 2.6 GM/DL
GLUCOSE BLD-MCNC: 117 MG/DL (ref 70–100)
GLUCOSE UR STRIP-MCNC: NEGATIVE MG/DL
HCT VFR BLD AUTO: 40.8 % (ref 37–47)
HGB BLD-MCNC: 14 G/DL (ref 12–16)
HGB UR QL STRIP.AUTO: ABNORMAL
HYALINE CASTS UR QL AUTO: ABNORMAL /LPF
IMM GRANULOCYTES # BLD: 0.02 10*3/MM3 (ref 0–0.03)
IMM GRANULOCYTES NFR BLD: 0.3 % (ref 0–5)
KETONES UR QL STRIP: NEGATIVE
LEFT ATRIUM VOLUME INDEX: 22.7 ML/M2
LEUKOCYTE ESTERASE UR QL STRIP.AUTO: ABNORMAL
LYMPHOCYTES # BLD AUTO: 1.67 10*3/MM3 (ref 0.72–4.86)
LYMPHOCYTES NFR BLD AUTO: 29.2 % (ref 15–45)
MAXIMAL PREDICTED HEART RATE: 146 BPM
MCH RBC QN AUTO: 29.4 PG (ref 28–32)
MCHC RBC AUTO-ENTMCNC: 34.3 G/DL (ref 33–36)
MCV RBC AUTO: 85.5 FL (ref 82–98)
METHADONE UR QL SCN: NEGATIVE
MONOCYTES # BLD AUTO: 0.41 10*3/MM3 (ref 0.19–1.3)
MONOCYTES NFR BLD AUTO: 7.2 % (ref 4–12)
NEUTROPHILS # BLD AUTO: 3.49 10*3/MM3 (ref 1.87–8.4)
NEUTROPHILS NFR BLD AUTO: 61.1 % (ref 39–78)
NITRITE UR QL STRIP: NEGATIVE
NRBC BLD MANUAL-RTO: 0 /100 WBC (ref 0–0)
OPIATES UR QL: NEGATIVE
PCP UR QL SCN: NEGATIVE
PH UR STRIP.AUTO: 7 [PH] (ref 5–8)
PLATELET # BLD AUTO: 230 10*3/MM3 (ref 130–400)
PMV BLD AUTO: 11 FL (ref 6–12)
POTASSIUM BLD-SCNC: 3.6 MMOL/L (ref 3.5–5.3)
PROT SERPL-MCNC: 6.7 G/DL (ref 6.3–8.7)
PROT UR QL STRIP: NEGATIVE
RBC # BLD AUTO: 4.77 10*6/MM3 (ref 4.2–5.4)
RBC # UR: ABNORMAL /HPF
REF LAB TEST METHOD: ABNORMAL
SODIUM BLD-SCNC: 144 MMOL/L (ref 135–145)
SP GR UR STRIP: <=1.005 (ref 1–1.03)
SQUAMOUS #/AREA URNS HPF: ABNORMAL /HPF
STRESS TARGET HR: 124 BPM
T3FREE SERPL-MCNC: 3.26 PG/ML (ref 2.77–5.27)
T4 FREE SERPL-MCNC: 1.19 NG/DL (ref 0.78–2.19)
TROPONIN I SERPL-MCNC: <0.012 NG/ML (ref 0–0.03)
TSH SERPL DL<=0.05 MIU/L-ACNC: 2.3 MIU/ML (ref 0.47–4.68)
UROBILINOGEN UR QL STRIP: ABNORMAL
WBC NRBC COR # BLD: 5.72 10*3/MM3 (ref 4.8–10.8)
WBC UR QL AUTO: ABNORMAL /HPF

## 2018-05-07 PROCEDURE — G0378 HOSPITAL OBSERVATION PER HR: HCPCS

## 2018-05-07 PROCEDURE — 80053 COMPREHEN METABOLIC PANEL: CPT | Performed by: PHYSICIAN ASSISTANT

## 2018-05-07 PROCEDURE — G9169 MEMORY GOAL STATUS: HCPCS | Performed by: SPEECH-LANGUAGE PATHOLOGIST

## 2018-05-07 PROCEDURE — 80307 DRUG TEST PRSMV CHEM ANLYZR: CPT | Performed by: PHYSICIAN ASSISTANT

## 2018-05-07 PROCEDURE — G9168 MEMORY CURRENT STATUS: HCPCS | Performed by: SPEECH-LANGUAGE PATHOLOGIST

## 2018-05-07 PROCEDURE — 85025 COMPLETE CBC W/AUTO DIFF WBC: CPT | Performed by: PHYSICIAN ASSISTANT

## 2018-05-07 PROCEDURE — 93306 TTE W/DOPPLER COMPLETE: CPT | Performed by: INTERNAL MEDICINE

## 2018-05-07 PROCEDURE — G8988 SELF CARE GOAL STATUS: HCPCS | Performed by: OCCUPATIONAL THERAPIST

## 2018-05-07 PROCEDURE — 99284 EMERGENCY DEPT VISIT MOD MDM: CPT

## 2018-05-07 PROCEDURE — 97165 OT EVAL LOW COMPLEX 30 MIN: CPT | Performed by: OCCUPATIONAL THERAPIST

## 2018-05-07 PROCEDURE — 93005 ELECTROCARDIOGRAM TRACING: CPT | Performed by: PHYSICIAN ASSISTANT

## 2018-05-07 PROCEDURE — 84484 ASSAY OF TROPONIN QUANT: CPT | Performed by: PHYSICIAN ASSISTANT

## 2018-05-07 PROCEDURE — 84443 ASSAY THYROID STIM HORMONE: CPT | Performed by: PHYSICIAN ASSISTANT

## 2018-05-07 PROCEDURE — 84439 ASSAY OF FREE THYROXINE: CPT | Performed by: PHYSICIAN ASSISTANT

## 2018-05-07 PROCEDURE — 81001 URINALYSIS AUTO W/SCOPE: CPT | Performed by: PHYSICIAN ASSISTANT

## 2018-05-07 PROCEDURE — 93306 TTE W/DOPPLER COMPLETE: CPT

## 2018-05-07 PROCEDURE — 92522 EVALUATE SPEECH PRODUCTION: CPT

## 2018-05-07 PROCEDURE — 87086 URINE CULTURE/COLONY COUNT: CPT | Performed by: PHYSICIAN ASSISTANT

## 2018-05-07 PROCEDURE — G8987 SELF CARE CURRENT STATUS: HCPCS | Performed by: OCCUPATIONAL THERAPIST

## 2018-05-07 PROCEDURE — 93010 ELECTROCARDIOGRAM REPORT: CPT | Performed by: INTERNAL MEDICINE

## 2018-05-07 PROCEDURE — 84481 FREE ASSAY (FT-3): CPT | Performed by: PHYSICIAN ASSISTANT

## 2018-05-07 PROCEDURE — 93880 EXTRACRANIAL BILAT STUDY: CPT | Performed by: SURGERY

## 2018-05-07 PROCEDURE — 71045 X-RAY EXAM CHEST 1 VIEW: CPT

## 2018-05-07 PROCEDURE — 93880 EXTRACRANIAL BILAT STUDY: CPT

## 2018-05-07 PROCEDURE — 70551 MRI BRAIN STEM W/O DYE: CPT

## 2018-05-07 PROCEDURE — 99204 OFFICE O/P NEW MOD 45 MIN: CPT | Performed by: PSYCHIATRY & NEUROLOGY

## 2018-05-07 RX ORDER — ASPIRIN 81 MG/1
81 TABLET, CHEWABLE ORAL DAILY
Status: DISCONTINUED | OUTPATIENT
Start: 2018-05-07 | End: 2018-05-08 | Stop reason: HOSPADM

## 2018-05-07 RX ORDER — ATORVASTATIN CALCIUM 40 MG/1
80 TABLET, FILM COATED ORAL NIGHTLY
Status: DISCONTINUED | OUTPATIENT
Start: 2018-05-07 | End: 2018-05-08 | Stop reason: HOSPADM

## 2018-05-07 RX ORDER — MECLIZINE HYDROCHLORIDE 25 MG/1
25 TABLET ORAL 3 TIMES DAILY PRN
Status: DISCONTINUED | OUTPATIENT
Start: 2018-05-07 | End: 2018-05-08 | Stop reason: HOSPADM

## 2018-05-07 RX ORDER — LORAZEPAM 0.5 MG/1
0.5 TABLET ORAL EVERY 12 HOURS PRN
Status: DISCONTINUED | OUTPATIENT
Start: 2018-05-07 | End: 2018-05-08 | Stop reason: HOSPADM

## 2018-05-07 RX ORDER — SODIUM CHLORIDE 0.9 % (FLUSH) 0.9 %
1-10 SYRINGE (ML) INJECTION AS NEEDED
Status: DISCONTINUED | OUTPATIENT
Start: 2018-05-07 | End: 2018-05-08 | Stop reason: HOSPADM

## 2018-05-07 RX ORDER — MELATONIN
1000 DAILY
Status: DISCONTINUED | OUTPATIENT
Start: 2018-05-07 | End: 2018-05-08 | Stop reason: HOSPADM

## 2018-05-07 RX ORDER — CLOPIDOGREL BISULFATE 75 MG/1
75 TABLET ORAL DAILY
Status: DISCONTINUED | OUTPATIENT
Start: 2018-05-07 | End: 2018-05-08 | Stop reason: HOSPADM

## 2018-05-07 RX ADMIN — MECLIZINE HYDROCHLORIDE 25 MG: 25 TABLET ORAL at 20:12

## 2018-05-07 RX ADMIN — CHOLECALCIFEROL (VITAMIN D3) 25 MCG (1,000 UNIT) TABLET 1000 UNITS: TABLET at 20:12

## 2018-05-07 RX ADMIN — ASPIRIN 81 MG 81 MG: 81 TABLET ORAL at 20:12

## 2018-05-07 RX ADMIN — CLOPIDOGREL 75 MG: 75 TABLET, FILM COATED ORAL at 14:36

## 2018-05-08 VITALS
HEIGHT: 65 IN | WEIGHT: 145 LBS | DIASTOLIC BLOOD PRESSURE: 78 MMHG | BODY MASS INDEX: 24.16 KG/M2 | HEART RATE: 90 BPM | RESPIRATION RATE: 18 BRPM | TEMPERATURE: 97.8 F | SYSTOLIC BLOOD PRESSURE: 150 MMHG | OXYGEN SATURATION: 94 %

## 2018-05-08 LAB
ARTICHOKE IGE QN: 150 MG/DL (ref 0–99)
CHOLEST SERPL-MCNC: 205 MG/DL (ref 130–200)
HBA1C MFR BLD: 5.5 %
HDLC SERPL-MCNC: 45 MG/DL
LDLC/HDLC SERPL: 3.03 {RATIO}
TRIGL SERPL-MCNC: 118 MG/DL (ref 0–149)

## 2018-05-08 PROCEDURE — 97110 THERAPEUTIC EXERCISES: CPT

## 2018-05-08 PROCEDURE — 83036 HEMOGLOBIN GLYCOSYLATED A1C: CPT | Performed by: FAMILY MEDICINE

## 2018-05-08 PROCEDURE — G0378 HOSPITAL OBSERVATION PER HR: HCPCS

## 2018-05-08 PROCEDURE — 99213 OFFICE O/P EST LOW 20 MIN: CPT | Performed by: PSYCHIATRY & NEUROLOGY

## 2018-05-08 PROCEDURE — 80061 LIPID PANEL: CPT | Performed by: FAMILY MEDICINE

## 2018-05-08 PROCEDURE — 97161 PT EVAL LOW COMPLEX 20 MIN: CPT | Performed by: PHYSICAL THERAPIST

## 2018-05-08 PROCEDURE — 92507 TX SP LANG VOICE COMM INDIV: CPT

## 2018-05-08 PROCEDURE — G8979 MOBILITY GOAL STATUS: HCPCS | Performed by: PHYSICAL THERAPIST

## 2018-05-08 PROCEDURE — 97116 GAIT TRAINING THERAPY: CPT

## 2018-05-08 PROCEDURE — G8978 MOBILITY CURRENT STATUS: HCPCS | Performed by: PHYSICAL THERAPIST

## 2018-05-08 PROCEDURE — 97535 SELF CARE MNGMENT TRAINING: CPT

## 2018-05-08 RX ORDER — ATORVASTATIN CALCIUM 20 MG/1
20 TABLET, FILM COATED ORAL DAILY
Qty: 30 TABLET | Refills: 0 | Status: SHIPPED | OUTPATIENT
Start: 2018-05-08

## 2018-05-08 RX ADMIN — ASPIRIN 81 MG 81 MG: 81 TABLET ORAL at 08:35

## 2018-05-08 RX ADMIN — CHOLECALCIFEROL (VITAMIN D3) 25 MCG (1,000 UNIT) TABLET 1000 UNITS: TABLET at 08:35

## 2018-05-08 RX ADMIN — CLOPIDOGREL 75 MG: 75 TABLET, FILM COATED ORAL at 08:35

## 2018-05-08 RX ADMIN — MECLIZINE HYDROCHLORIDE 25 MG: 25 TABLET ORAL at 06:23

## 2018-05-08 RX ADMIN — LORAZEPAM 0.5 MG: 0.5 TABLET ORAL at 00:45

## 2018-05-09 LAB
BACTERIA SPEC AEROBE CULT: ABNORMAL
BACTERIA SPEC AEROBE CULT: ABNORMAL

## 2018-05-10 ENCOUNTER — TELEPHONE (OUTPATIENT)
Dept: INTERNAL MEDICINE | Age: 73
End: 2018-05-10

## 2018-05-10 DIAGNOSIS — R42 CHRONIC VERTIGO: ICD-10-CM

## 2018-05-10 DIAGNOSIS — R42 DIZZINESS: Primary | ICD-10-CM

## 2018-05-15 ENCOUNTER — APPOINTMENT (OUTPATIENT)
Dept: CT IMAGING | Facility: HOSPITAL | Age: 73
End: 2018-05-15

## 2018-05-15 ENCOUNTER — OFFICE VISIT (OUTPATIENT)
Dept: INTERNAL MEDICINE | Age: 73
End: 2018-05-15
Payer: MEDICARE

## 2018-05-15 ENCOUNTER — HOSPITAL ENCOUNTER (EMERGENCY)
Facility: HOSPITAL | Age: 73
Discharge: HOME | End: 2018-05-15
Admitting: FAMILY MEDICINE

## 2018-05-15 VITALS
SYSTOLIC BLOOD PRESSURE: 138 MMHG | DIASTOLIC BLOOD PRESSURE: 70 MMHG | RESPIRATION RATE: 18 BRPM | BODY MASS INDEX: 23.3 KG/M2 | HEIGHT: 66 IN | WEIGHT: 145 LBS | HEART RATE: 88 BPM | OXYGEN SATURATION: 94 %

## 2018-05-15 VITALS
OXYGEN SATURATION: 94 % | BODY MASS INDEX: 24.75 KG/M2 | DIASTOLIC BLOOD PRESSURE: 83 MMHG | HEIGHT: 64 IN | RESPIRATION RATE: 16 BRPM | TEMPERATURE: 98.6 F | SYSTOLIC BLOOD PRESSURE: 169 MMHG | WEIGHT: 145 LBS | HEART RATE: 80 BPM

## 2018-05-15 DIAGNOSIS — R73.01 IMPAIRED FASTING GLUCOSE: ICD-10-CM

## 2018-05-15 DIAGNOSIS — H81.10 BENIGN PAROXYSMAL POSITIONAL VERTIGO, UNSPECIFIED LATERALITY: ICD-10-CM

## 2018-05-15 DIAGNOSIS — R42 DIZZINESS: Primary | ICD-10-CM

## 2018-05-15 DIAGNOSIS — E55.9 VITAMIN D DEFICIENCY: ICD-10-CM

## 2018-05-15 DIAGNOSIS — H81.13 BENIGN PAROXYSMAL POSITIONAL VERTIGO DUE TO BILATERAL VESTIBULAR DISORDER: Primary | ICD-10-CM

## 2018-05-15 DIAGNOSIS — E78.00 PURE HYPERCHOLESTEROLEMIA: ICD-10-CM

## 2018-05-15 LAB
ALBUMIN SERPL-MCNC: 4.1 G/DL (ref 3.5–5)
ALBUMIN/GLOB SERPL: 1.6 G/DL (ref 1.1–2.5)
ALP SERPL-CCNC: 76 U/L (ref 24–120)
ALT SERPL W P-5'-P-CCNC: 23 U/L (ref 0–54)
ANION GAP SERPL CALCULATED.3IONS-SCNC: 12 MMOL/L (ref 4–13)
AST SERPL-CCNC: 18 U/L (ref 7–45)
BACTERIA UR QL AUTO: ABNORMAL /HPF
BASOPHILS # BLD AUTO: 0.06 10*3/MM3 (ref 0–0.2)
BASOPHILS NFR BLD AUTO: 0.8 % (ref 0–2)
BILIRUB SERPL-MCNC: 0.5 MG/DL (ref 0.1–1)
BILIRUB UR QL STRIP: NEGATIVE
BUN BLD-MCNC: 10 MG/DL (ref 5–21)
BUN/CREAT SERPL: 14.9 (ref 7–25)
CALCIUM SPEC-SCNC: 10 MG/DL (ref 8.4–10.4)
CHLORIDE SERPL-SCNC: 108 MMOL/L (ref 98–110)
CLARITY UR: CLEAR
CO2 SERPL-SCNC: 25 MMOL/L (ref 24–31)
COLOR UR: YELLOW
CREAT BLD-MCNC: 0.67 MG/DL (ref 0.5–1.4)
DEPRECATED RDW RBC AUTO: 39.4 FL (ref 40–54)
EOSINOPHIL # BLD AUTO: 0.07 10*3/MM3 (ref 0–0.7)
EOSINOPHIL NFR BLD AUTO: 1 % (ref 0–4)
ERYTHROCYTE [DISTWIDTH] IN BLOOD BY AUTOMATED COUNT: 12.9 % (ref 12–15)
GFR SERPL CREATININE-BSD FRML MDRD: 86 ML/MIN/1.73
GLOBULIN UR ELPH-MCNC: 2.5 GM/DL
GLUCOSE BLD-MCNC: 101 MG/DL (ref 70–100)
GLUCOSE UR STRIP-MCNC: NEGATIVE MG/DL
HCT VFR BLD AUTO: 39.8 % (ref 37–47)
HGB BLD-MCNC: 13.6 G/DL (ref 12–16)
HGB UR QL STRIP.AUTO: ABNORMAL
IMM GRANULOCYTES # BLD: 0.03 10*3/MM3 (ref 0–0.03)
IMM GRANULOCYTES NFR BLD: 0.4 % (ref 0–5)
INR PPP: 0.92 (ref 0.91–1.09)
KETONES UR QL STRIP: NEGATIVE
LEUKOCYTE ESTERASE UR QL STRIP.AUTO: ABNORMAL
LYMPHOCYTES # BLD AUTO: 1.88 10*3/MM3 (ref 0.72–4.86)
LYMPHOCYTES NFR BLD AUTO: 26.3 % (ref 15–45)
MCH RBC QN AUTO: 29 PG (ref 28–32)
MCHC RBC AUTO-ENTMCNC: 34.2 G/DL (ref 33–36)
MCV RBC AUTO: 84.9 FL (ref 82–98)
MONOCYTES # BLD AUTO: 0.44 10*3/MM3 (ref 0.19–1.3)
MONOCYTES NFR BLD AUTO: 6.2 % (ref 4–12)
NEUTROPHILS # BLD AUTO: 4.66 10*3/MM3 (ref 1.87–8.4)
NEUTROPHILS NFR BLD AUTO: 65.3 % (ref 39–78)
NITRITE UR QL STRIP: NEGATIVE
NRBC BLD MANUAL-RTO: 0 /100 WBC (ref 0–0)
PH UR STRIP.AUTO: 6.5 [PH] (ref 5–8)
PLATELET # BLD AUTO: 217 10*3/MM3 (ref 130–400)
PMV BLD AUTO: 11.3 FL (ref 6–12)
POTASSIUM BLD-SCNC: 3.5 MMOL/L (ref 3.5–5.3)
PROT SERPL-MCNC: 6.6 G/DL (ref 6.3–8.7)
PROT UR QL STRIP: NEGATIVE
PROTHROMBIN TIME: 12.6 SECONDS (ref 11.9–14.6)
RBC # BLD AUTO: 4.69 10*6/MM3 (ref 4.2–5.4)
RBC # UR: ABNORMAL /HPF
REF LAB TEST METHOD: ABNORMAL
SODIUM BLD-SCNC: 145 MMOL/L (ref 135–145)
SP GR UR STRIP: 1.01 (ref 1–1.03)
SQUAMOUS #/AREA URNS HPF: ABNORMAL /HPF
TROPONIN I SERPL-MCNC: <0.012 NG/ML (ref 0–0.03)
UROBILINOGEN UR QL STRIP: ABNORMAL
WBC NRBC COR # BLD: 7.14 10*3/MM3 (ref 4.8–10.8)
WBC UR QL AUTO: ABNORMAL /HPF

## 2018-05-15 PROCEDURE — 84484 ASSAY OF TROPONIN QUANT: CPT | Performed by: PHYSICIAN ASSISTANT

## 2018-05-15 PROCEDURE — 99284 EMERGENCY DEPT VISIT MOD MDM: CPT

## 2018-05-15 PROCEDURE — 85025 COMPLETE CBC W/AUTO DIFF WBC: CPT | Performed by: PHYSICIAN ASSISTANT

## 2018-05-15 PROCEDURE — 93005 ELECTROCARDIOGRAM TRACING: CPT | Performed by: PHYSICIAN ASSISTANT

## 2018-05-15 PROCEDURE — 70450 CT HEAD/BRAIN W/O DYE: CPT

## 2018-05-15 PROCEDURE — 93010 ELECTROCARDIOGRAM REPORT: CPT | Performed by: INTERNAL MEDICINE

## 2018-05-15 PROCEDURE — 99496 TRANSJ CARE MGMT HIGH F2F 7D: CPT | Performed by: INTERNAL MEDICINE

## 2018-05-15 PROCEDURE — 80053 COMPREHEN METABOLIC PANEL: CPT | Performed by: PHYSICIAN ASSISTANT

## 2018-05-15 PROCEDURE — 85610 PROTHROMBIN TIME: CPT | Performed by: PHYSICIAN ASSISTANT

## 2018-05-15 PROCEDURE — 81001 URINALYSIS AUTO W/SCOPE: CPT | Performed by: PHYSICIAN ASSISTANT

## 2018-05-15 RX ORDER — MECLIZINE HYDROCHLORIDE 25 MG/1
25 TABLET ORAL ONCE
Status: COMPLETED | OUTPATIENT
Start: 2018-05-15 | End: 2018-05-15

## 2018-05-15 RX ORDER — ERGOCALCIFEROL 1.25 MG/1
50000 CAPSULE ORAL WEEKLY
Qty: 4 CAPSULE | Refills: 3 | Status: SHIPPED | OUTPATIENT
Start: 2018-05-15 | End: 2018-11-07 | Stop reason: SDUPTHER

## 2018-05-15 RX ORDER — SODIUM CHLORIDE 0.9 % (FLUSH) 0.9 %
10 SYRINGE (ML) INJECTION AS NEEDED
Status: DISCONTINUED | OUTPATIENT
Start: 2018-05-15 | End: 2018-05-15 | Stop reason: HOSPADM

## 2018-05-15 RX ADMIN — MECLIZINE HYDROCHLORIDE 25 MG: 25 TABLET ORAL at 09:18

## 2018-05-15 ASSESSMENT — ENCOUNTER SYMPTOMS
ABDOMINAL PAIN: 0
CONSTIPATION: 0
WHEEZING: 0
CHEST TIGHTNESS: 0
SORE THROAT: 0
COUGH: 0

## 2018-05-16 RX ORDER — MECLIZINE HYDROCHLORIDE 25 MG/1
25 TABLET ORAL 3 TIMES DAILY PRN
Qty: 30 TABLET | Refills: 0 | Status: SHIPPED | OUTPATIENT
Start: 2018-05-16 | End: 2018-06-20 | Stop reason: SDUPTHER

## 2018-05-17 ENCOUNTER — HOSPITAL ENCOUNTER (EMERGENCY)
Facility: HOSPITAL | Age: 73
Discharge: HOME | End: 2018-05-17
Attending: EMERGENCY MEDICINE | Admitting: EMERGENCY MEDICINE

## 2018-05-17 VITALS
OXYGEN SATURATION: 98 % | HEIGHT: 65 IN | DIASTOLIC BLOOD PRESSURE: 66 MMHG | RESPIRATION RATE: 16 BRPM | WEIGHT: 154.6 LBS | BODY MASS INDEX: 25.76 KG/M2 | TEMPERATURE: 97.9 F | SYSTOLIC BLOOD PRESSURE: 143 MMHG | HEART RATE: 61 BPM

## 2018-05-17 DIAGNOSIS — I10 HYPERTENSION, UNSPECIFIED TYPE: Primary | ICD-10-CM

## 2018-05-17 DIAGNOSIS — R42 LIGHTHEADEDNESS: ICD-10-CM

## 2018-05-17 LAB
ALBUMIN SERPL-MCNC: 4.5 G/DL (ref 3.5–5)
ALBUMIN/GLOB SERPL: 1.5 G/DL (ref 1.1–2.5)
ALP SERPL-CCNC: 92 U/L (ref 24–120)
ALT SERPL W P-5'-P-CCNC: 24 U/L (ref 0–54)
ANION GAP SERPL CALCULATED.3IONS-SCNC: 14 MMOL/L (ref 4–13)
AST SERPL-CCNC: 22 U/L (ref 7–45)
BACTERIA UR QL AUTO: ABNORMAL /HPF
BASOPHILS # BLD AUTO: 0.08 10*3/MM3 (ref 0–0.2)
BASOPHILS NFR BLD AUTO: 1.1 % (ref 0–2)
BILIRUB SERPL-MCNC: 0.3 MG/DL (ref 0.1–1)
BILIRUB UR QL STRIP: NEGATIVE
BUN BLD-MCNC: 14 MG/DL (ref 5–21)
BUN/CREAT SERPL: 20.3 (ref 7–25)
CALCIUM SPEC-SCNC: 9.7 MG/DL (ref 8.4–10.4)
CHLORIDE SERPL-SCNC: 106 MMOL/L (ref 98–110)
CLARITY UR: CLEAR
CO2 SERPL-SCNC: 26 MMOL/L (ref 24–31)
COLOR UR: YELLOW
CREAT BLD-MCNC: 0.69 MG/DL (ref 0.5–1.4)
DEPRECATED RDW RBC AUTO: 39.5 FL (ref 40–54)
EOSINOPHIL # BLD AUTO: 0.09 10*3/MM3 (ref 0–0.7)
EOSINOPHIL NFR BLD AUTO: 1.3 % (ref 0–4)
ERYTHROCYTE [DISTWIDTH] IN BLOOD BY AUTOMATED COUNT: 12.9 % (ref 12–15)
GFR SERPL CREATININE-BSD FRML MDRD: 83 ML/MIN/1.73
GLOBULIN UR ELPH-MCNC: 3 GM/DL
GLUCOSE BLD-MCNC: 123 MG/DL (ref 70–100)
GLUCOSE UR STRIP-MCNC: NEGATIVE MG/DL
HCT VFR BLD AUTO: 42.5 % (ref 37–47)
HGB BLD-MCNC: 14.5 G/DL (ref 12–16)
HGB UR QL STRIP.AUTO: ABNORMAL
HYALINE CASTS UR QL AUTO: ABNORMAL /LPF
IMM GRANULOCYTES # BLD: 0.02 10*3/MM3 (ref 0–0.03)
IMM GRANULOCYTES NFR BLD: 0.3 % (ref 0–5)
KETONES UR QL STRIP: NEGATIVE
LEUKOCYTE ESTERASE UR QL STRIP.AUTO: ABNORMAL
LYMPHOCYTES # BLD AUTO: 2.32 10*3/MM3 (ref 0.72–4.86)
LYMPHOCYTES NFR BLD AUTO: 33 % (ref 15–45)
MCH RBC QN AUTO: 29.1 PG (ref 28–32)
MCHC RBC AUTO-ENTMCNC: 34.1 G/DL (ref 33–36)
MCV RBC AUTO: 85.2 FL (ref 82–98)
MONOCYTES # BLD AUTO: 0.49 10*3/MM3 (ref 0.19–1.3)
MONOCYTES NFR BLD AUTO: 7 % (ref 4–12)
NEUTROPHILS # BLD AUTO: 4.04 10*3/MM3 (ref 1.87–8.4)
NEUTROPHILS NFR BLD AUTO: 57.3 % (ref 39–78)
NITRITE UR QL STRIP: NEGATIVE
NRBC BLD MANUAL-RTO: 0 /100 WBC (ref 0–0)
PH UR STRIP.AUTO: 6.5 [PH] (ref 5–8)
PLATELET # BLD AUTO: 241 10*3/MM3 (ref 130–400)
PMV BLD AUTO: 11.4 FL (ref 6–12)
POTASSIUM BLD-SCNC: 3.5 MMOL/L (ref 3.5–5.3)
PROT SERPL-MCNC: 7.5 G/DL (ref 6.3–8.7)
PROT UR QL STRIP: NEGATIVE
RBC # BLD AUTO: 4.99 10*6/MM3 (ref 4.2–5.4)
RBC # UR: ABNORMAL /HPF
REF LAB TEST METHOD: ABNORMAL
SODIUM BLD-SCNC: 146 MMOL/L (ref 135–145)
SP GR UR STRIP: 1.01 (ref 1–1.03)
SQUAMOUS #/AREA URNS HPF: ABNORMAL /HPF
TROPONIN I SERPL-MCNC: <0.012 NG/ML (ref 0–0.03)
TROPONIN I SERPL-MCNC: <0.012 NG/ML (ref 0–0.03)
UROBILINOGEN UR QL STRIP: ABNORMAL
WBC NRBC COR # BLD: 7.04 10*3/MM3 (ref 4.8–10.8)
WBC UR QL AUTO: ABNORMAL /HPF

## 2018-05-17 PROCEDURE — 93005 ELECTROCARDIOGRAM TRACING: CPT | Performed by: EMERGENCY MEDICINE

## 2018-05-17 PROCEDURE — 99285 EMERGENCY DEPT VISIT HI MDM: CPT

## 2018-05-17 PROCEDURE — 81001 URINALYSIS AUTO W/SCOPE: CPT | Performed by: EMERGENCY MEDICINE

## 2018-05-17 PROCEDURE — 84484 ASSAY OF TROPONIN QUANT: CPT | Performed by: EMERGENCY MEDICINE

## 2018-05-17 PROCEDURE — 80053 COMPREHEN METABOLIC PANEL: CPT | Performed by: EMERGENCY MEDICINE

## 2018-05-17 PROCEDURE — 96360 HYDRATION IV INFUSION INIT: CPT

## 2018-05-17 PROCEDURE — 93010 ELECTROCARDIOGRAM REPORT: CPT | Performed by: INTERNAL MEDICINE

## 2018-05-17 PROCEDURE — 85025 COMPLETE CBC W/AUTO DIFF WBC: CPT | Performed by: EMERGENCY MEDICINE

## 2018-05-17 RX ADMIN — SODIUM CHLORIDE 1000 ML: 9 INJECTION, SOLUTION INTRAVENOUS at 02:50

## 2018-05-18 ENCOUNTER — OFFICE VISIT (OUTPATIENT)
Dept: INTERNAL MEDICINE | Age: 73
End: 2018-05-18
Payer: MEDICARE

## 2018-05-18 VITALS
RESPIRATION RATE: 18 BRPM | SYSTOLIC BLOOD PRESSURE: 126 MMHG | HEIGHT: 65 IN | DIASTOLIC BLOOD PRESSURE: 84 MMHG | BODY MASS INDEX: 24.16 KG/M2 | HEART RATE: 72 BPM | OXYGEN SATURATION: 99 % | WEIGHT: 145 LBS

## 2018-05-18 DIAGNOSIS — I10 PAROXYSMAL HYPERTENSION: ICD-10-CM

## 2018-05-18 DIAGNOSIS — F41.1 GENERALIZED ANXIETY DISORDER: ICD-10-CM

## 2018-05-18 DIAGNOSIS — H81.13 BENIGN PAROXYSMAL POSITIONAL VERTIGO DUE TO BILATERAL VESTIBULAR DISORDER: ICD-10-CM

## 2018-05-18 DIAGNOSIS — R03.0 ELEVATED BLOOD PRESSURE READING: Primary | ICD-10-CM

## 2018-05-18 PROCEDURE — 99214 OFFICE O/P EST MOD 30 MIN: CPT | Performed by: INTERNAL MEDICINE

## 2018-05-18 PROCEDURE — 1090F PRES/ABSN URINE INCON ASSESS: CPT | Performed by: INTERNAL MEDICINE

## 2018-05-18 PROCEDURE — G8400 PT W/DXA NO RESULTS DOC: HCPCS | Performed by: INTERNAL MEDICINE

## 2018-05-18 PROCEDURE — 4040F PNEUMOC VAC/ADMIN/RCVD: CPT | Performed by: INTERNAL MEDICINE

## 2018-05-18 PROCEDURE — 1123F ACP DISCUSS/DSCN MKR DOCD: CPT | Performed by: INTERNAL MEDICINE

## 2018-05-18 PROCEDURE — G8427 DOCREV CUR MEDS BY ELIG CLIN: HCPCS | Performed by: INTERNAL MEDICINE

## 2018-05-18 PROCEDURE — G8598 ASA/ANTIPLAT THER USED: HCPCS | Performed by: INTERNAL MEDICINE

## 2018-05-18 PROCEDURE — G8420 CALC BMI NORM PARAMETERS: HCPCS | Performed by: INTERNAL MEDICINE

## 2018-05-18 PROCEDURE — 1036F TOBACCO NON-USER: CPT | Performed by: INTERNAL MEDICINE

## 2018-05-18 PROCEDURE — 3017F COLORECTAL CA SCREEN DOC REV: CPT | Performed by: INTERNAL MEDICINE

## 2018-05-18 RX ORDER — LORAZEPAM 0.5 MG/1
TABLET ORAL
Qty: 40 TABLET | Refills: 0 | Status: SHIPPED | OUTPATIENT
Start: 2018-05-18 | End: 2018-06-07

## 2018-05-18 ASSESSMENT — ENCOUNTER SYMPTOMS
ABDOMINAL PAIN: 0
CHEST TIGHTNESS: 0
WHEEZING: 0
COUGH: 0
CONSTIPATION: 0
SORE THROAT: 0

## 2018-05-20 ENCOUNTER — APPOINTMENT (OUTPATIENT)
Dept: GENERAL RADIOLOGY | Facility: HOSPITAL | Age: 73
End: 2018-05-20

## 2018-05-20 ENCOUNTER — HOSPITAL ENCOUNTER (EMERGENCY)
Facility: HOSPITAL | Age: 73
Discharge: HOME | End: 2018-05-20
Attending: EMERGENCY MEDICINE | Admitting: EMERGENCY MEDICINE

## 2018-05-20 VITALS
HEIGHT: 65 IN | BODY MASS INDEX: 25.66 KG/M2 | OXYGEN SATURATION: 94 % | SYSTOLIC BLOOD PRESSURE: 107 MMHG | DIASTOLIC BLOOD PRESSURE: 59 MMHG | WEIGHT: 154 LBS | HEART RATE: 63 BPM | TEMPERATURE: 97.7 F | RESPIRATION RATE: 16 BRPM

## 2018-05-20 DIAGNOSIS — I10 CHRONIC HYPERTENSION: ICD-10-CM

## 2018-05-20 DIAGNOSIS — R42 DIZZINESS: Primary | ICD-10-CM

## 2018-05-20 LAB
ALBUMIN SERPL-MCNC: 4 G/DL (ref 3.5–5)
ALBUMIN/GLOB SERPL: 1.6 G/DL (ref 1.1–2.5)
ALP SERPL-CCNC: 76 U/L (ref 24–120)
ALT SERPL W P-5'-P-CCNC: 24 U/L (ref 0–54)
ANION GAP SERPL CALCULATED.3IONS-SCNC: 13 MMOL/L (ref 4–13)
AST SERPL-CCNC: 17 U/L (ref 7–45)
BACTERIA UR QL AUTO: ABNORMAL /HPF
BASOPHILS # BLD AUTO: 0.07 10*3/MM3 (ref 0–0.2)
BASOPHILS NFR BLD AUTO: 1 % (ref 0–2)
BILIRUB SERPL-MCNC: 0.6 MG/DL (ref 0.1–1)
BILIRUB UR QL STRIP: NEGATIVE
BUN BLD-MCNC: 16 MG/DL (ref 5–21)
BUN/CREAT SERPL: 26.2 (ref 7–25)
CALCIUM SPEC-SCNC: 9.7 MG/DL (ref 8.4–10.4)
CHLORIDE SERPL-SCNC: 108 MMOL/L (ref 98–110)
CLARITY UR: CLEAR
CO2 SERPL-SCNC: 24 MMOL/L (ref 24–31)
COLOR UR: YELLOW
CREAT BLD-MCNC: 0.61 MG/DL (ref 0.5–1.4)
DEPRECATED RDW RBC AUTO: 39.4 FL (ref 40–54)
EOSINOPHIL # BLD AUTO: 0.08 10*3/MM3 (ref 0–0.7)
EOSINOPHIL NFR BLD AUTO: 1.2 % (ref 0–4)
ERYTHROCYTE [DISTWIDTH] IN BLOOD BY AUTOMATED COUNT: 13 % (ref 12–15)
GFR SERPL CREATININE-BSD FRML MDRD: 96 ML/MIN/1.73
GLOBULIN UR ELPH-MCNC: 2.5 GM/DL
GLUCOSE BLD-MCNC: 118 MG/DL (ref 70–100)
GLUCOSE BLDC GLUCOMTR-MCNC: 118 MG/DL (ref 70–130)
GLUCOSE UR STRIP-MCNC: NEGATIVE MG/DL
HCT VFR BLD AUTO: 37.6 % (ref 37–47)
HGB BLD-MCNC: 13.1 G/DL (ref 12–16)
HGB UR QL STRIP.AUTO: ABNORMAL
HOLD SPECIMEN: NORMAL
HOLD SPECIMEN: NORMAL
HYALINE CASTS UR QL AUTO: ABNORMAL /LPF
IMM GRANULOCYTES # BLD: 0.03 10*3/MM3 (ref 0–0.03)
IMM GRANULOCYTES NFR BLD: 0.4 % (ref 0–5)
KETONES UR QL STRIP: NEGATIVE
LEUKOCYTE ESTERASE UR QL STRIP.AUTO: ABNORMAL
LYMPHOCYTES # BLD AUTO: 1.99 10*3/MM3 (ref 0.72–4.86)
LYMPHOCYTES NFR BLD AUTO: 28.8 % (ref 15–45)
MAGNESIUM SERPL-MCNC: 1.9 MG/DL (ref 1.4–2.2)
MCH RBC QN AUTO: 29.2 PG (ref 28–32)
MCHC RBC AUTO-ENTMCNC: 34.8 G/DL (ref 33–36)
MCV RBC AUTO: 83.9 FL (ref 82–98)
MONOCYTES # BLD AUTO: 0.48 10*3/MM3 (ref 0.19–1.3)
MONOCYTES NFR BLD AUTO: 6.9 % (ref 4–12)
NEUTROPHILS # BLD AUTO: 4.26 10*3/MM3 (ref 1.87–8.4)
NEUTROPHILS NFR BLD AUTO: 61.7 % (ref 39–78)
NITRITE UR QL STRIP: NEGATIVE
NRBC BLD MANUAL-RTO: 0 /100 WBC (ref 0–0)
PH UR STRIP.AUTO: 6.5 [PH] (ref 5–8)
PLATELET # BLD AUTO: 221 10*3/MM3 (ref 130–400)
PMV BLD AUTO: 11.1 FL (ref 6–12)
POTASSIUM BLD-SCNC: 3.5 MMOL/L (ref 3.5–5.3)
PROT SERPL-MCNC: 6.5 G/DL (ref 6.3–8.7)
PROT UR QL STRIP: NEGATIVE
RBC # BLD AUTO: 4.48 10*6/MM3 (ref 4.2–5.4)
RBC # UR: ABNORMAL /HPF
REF LAB TEST METHOD: ABNORMAL
SODIUM BLD-SCNC: 145 MMOL/L (ref 135–145)
SP GR UR STRIP: <=1.005 (ref 1–1.03)
SQUAMOUS #/AREA URNS HPF: ABNORMAL /HPF
TROPONIN I SERPL-MCNC: <0.012 NG/ML (ref 0–0.03)
UROBILINOGEN UR QL STRIP: ABNORMAL
WBC NRBC COR # BLD: 6.91 10*3/MM3 (ref 4.8–10.8)
WBC UR QL AUTO: ABNORMAL /HPF
WHOLE BLOOD HOLD SPECIMEN: NORMAL
WHOLE BLOOD HOLD SPECIMEN: NORMAL

## 2018-05-20 PROCEDURE — 85025 COMPLETE CBC W/AUTO DIFF WBC: CPT

## 2018-05-20 PROCEDURE — 80053 COMPREHEN METABOLIC PANEL: CPT | Performed by: EMERGENCY MEDICINE

## 2018-05-20 PROCEDURE — 82962 GLUCOSE BLOOD TEST: CPT

## 2018-05-20 PROCEDURE — 99285 EMERGENCY DEPT VISIT HI MDM: CPT

## 2018-05-20 PROCEDURE — 81001 URINALYSIS AUTO W/SCOPE: CPT | Performed by: EMERGENCY MEDICINE

## 2018-05-20 PROCEDURE — 84484 ASSAY OF TROPONIN QUANT: CPT | Performed by: EMERGENCY MEDICINE

## 2018-05-20 PROCEDURE — 83735 ASSAY OF MAGNESIUM: CPT | Performed by: EMERGENCY MEDICINE

## 2018-05-20 PROCEDURE — 87086 URINE CULTURE/COLONY COUNT: CPT

## 2018-05-20 PROCEDURE — 93005 ELECTROCARDIOGRAM TRACING: CPT | Performed by: EMERGENCY MEDICINE

## 2018-05-20 PROCEDURE — 71045 X-RAY EXAM CHEST 1 VIEW: CPT

## 2018-05-20 PROCEDURE — 93010 ELECTROCARDIOGRAM REPORT: CPT | Performed by: INTERNAL MEDICINE

## 2018-05-20 RX ORDER — CLONIDINE HYDROCHLORIDE 0.1 MG/1
0.1 TABLET ORAL ONCE
Status: COMPLETED | OUTPATIENT
Start: 2018-05-20 | End: 2018-05-20

## 2018-05-20 RX ORDER — MECLIZINE HYDROCHLORIDE 25 MG/1
25 TABLET ORAL ONCE
Status: COMPLETED | OUTPATIENT
Start: 2018-05-20 | End: 2018-05-20

## 2018-05-20 RX ORDER — SODIUM CHLORIDE 0.9 % (FLUSH) 0.9 %
10 SYRINGE (ML) INJECTION AS NEEDED
Status: DISCONTINUED | OUTPATIENT
Start: 2018-05-20 | End: 2018-05-20 | Stop reason: HOSPADM

## 2018-05-20 RX ADMIN — MECLIZINE HYDROCHLORIDE 25 MG: 25 TABLET ORAL at 07:19

## 2018-05-20 RX ADMIN — CLONIDINE HYDROCHLORIDE 0.1 MG: 0.1 TABLET ORAL at 07:20

## 2018-05-22 LAB — BACTERIA SPEC AEROBE CULT: ABNORMAL

## 2018-05-25 ENCOUNTER — TELEPHONE (OUTPATIENT)
Dept: INTERNAL MEDICINE | Age: 73
End: 2018-05-25

## 2018-06-01 ENCOUNTER — OFFICE VISIT (OUTPATIENT)
Dept: INTERNAL MEDICINE | Age: 73
End: 2018-06-01
Payer: MEDICARE

## 2018-06-01 VITALS
DIASTOLIC BLOOD PRESSURE: 78 MMHG | HEART RATE: 79 BPM | SYSTOLIC BLOOD PRESSURE: 128 MMHG | HEIGHT: 65 IN | RESPIRATION RATE: 18 BRPM | BODY MASS INDEX: 23.99 KG/M2 | WEIGHT: 144 LBS | OXYGEN SATURATION: 94 %

## 2018-06-01 DIAGNOSIS — H81.13 BENIGN PAROXYSMAL POSITIONAL VERTIGO DUE TO BILATERAL VESTIBULAR DISORDER: ICD-10-CM

## 2018-06-01 DIAGNOSIS — R42 DIZZINESS: ICD-10-CM

## 2018-06-01 DIAGNOSIS — R03.0 ELEVATED BLOOD PRESSURE READING: Primary | ICD-10-CM

## 2018-06-01 PROCEDURE — G8427 DOCREV CUR MEDS BY ELIG CLIN: HCPCS | Performed by: INTERNAL MEDICINE

## 2018-06-01 PROCEDURE — 1036F TOBACCO NON-USER: CPT | Performed by: INTERNAL MEDICINE

## 2018-06-01 PROCEDURE — 3017F COLORECTAL CA SCREEN DOC REV: CPT | Performed by: INTERNAL MEDICINE

## 2018-06-01 PROCEDURE — G8400 PT W/DXA NO RESULTS DOC: HCPCS | Performed by: INTERNAL MEDICINE

## 2018-06-01 PROCEDURE — 1123F ACP DISCUSS/DSCN MKR DOCD: CPT | Performed by: INTERNAL MEDICINE

## 2018-06-01 PROCEDURE — 99213 OFFICE O/P EST LOW 20 MIN: CPT | Performed by: INTERNAL MEDICINE

## 2018-06-01 PROCEDURE — 4040F PNEUMOC VAC/ADMIN/RCVD: CPT | Performed by: INTERNAL MEDICINE

## 2018-06-01 PROCEDURE — 1090F PRES/ABSN URINE INCON ASSESS: CPT | Performed by: INTERNAL MEDICINE

## 2018-06-01 PROCEDURE — G8420 CALC BMI NORM PARAMETERS: HCPCS | Performed by: INTERNAL MEDICINE

## 2018-06-01 PROCEDURE — G8598 ASA/ANTIPLAT THER USED: HCPCS | Performed by: INTERNAL MEDICINE

## 2018-06-01 ASSESSMENT — ENCOUNTER SYMPTOMS
SORE THROAT: 0
CHEST TIGHTNESS: 0
ABDOMINAL PAIN: 0
CONSTIPATION: 0
WHEEZING: 0
COUGH: 0

## 2018-06-04 ENCOUNTER — OFFICE VISIT (OUTPATIENT)
Dept: OTOLARYNGOLOGY | Age: 73
End: 2018-06-04
Payer: MEDICARE

## 2018-06-04 VITALS
HEIGHT: 65 IN | SYSTOLIC BLOOD PRESSURE: 118 MMHG | DIASTOLIC BLOOD PRESSURE: 68 MMHG | TEMPERATURE: 97.9 F | HEART RATE: 78 BPM | OXYGEN SATURATION: 99 % | WEIGHT: 145 LBS | BODY MASS INDEX: 24.16 KG/M2 | RESPIRATION RATE: 16 BRPM

## 2018-06-04 DIAGNOSIS — H81.11 VERTIGO, BENIGN PAROXYSMAL, RIGHT: ICD-10-CM

## 2018-06-04 DIAGNOSIS — H93.13 TINNITUS AURIUM, BILATERAL: ICD-10-CM

## 2018-06-04 DIAGNOSIS — H91.93 BILATERAL HEARING LOSS, UNSPECIFIED HEARING LOSS TYPE: ICD-10-CM

## 2018-06-04 PROCEDURE — 99203 OFFICE O/P NEW LOW 30 MIN: CPT | Performed by: OTOLARYNGOLOGY

## 2018-06-07 LAB — FUNGUS WND CULT: ABNORMAL

## 2018-06-20 RX ORDER — MECLIZINE HYDROCHLORIDE 25 MG/1
TABLET ORAL
Qty: 30 TABLET | Refills: 0 | Status: SHIPPED | OUTPATIENT
Start: 2018-06-20 | End: 2018-07-13 | Stop reason: SDUPTHER

## 2018-07-12 DIAGNOSIS — F41.1 GENERALIZED ANXIETY DISORDER: Primary | ICD-10-CM

## 2018-07-13 NOTE — TELEPHONE ENCOUNTER
Last Appointment Date: 6/1/2018  Next Appointment Date: 8/15/2018    Allergies   Allergen Reactions    Latex     Alcohol      liquid  liquid    Astelin [Azelastine Hcl]      solution  solution    Ciprofloxacin Hcl Swelling    Fluticasone Propionate      suspension  suspension    Hydroquinone      cream    Livalo [Pitavastatin]      tablets    Other      seafood  seafood    Vitamin B Complex [B-100]      Vit B 3 tablets  Vit B 3 tablets       Patient needs refill on   Requested Prescriptions     Pending Prescriptions Disp Refills    meclizine (ANTIVERT) 25 MG tablet 30 tablet 0     Sig: TAKE 1 TABLET BY MOUTH THREE TIMES DAILY AS NEEDED FOR DIZZINESS

## 2018-07-14 RX ORDER — MECLIZINE HYDROCHLORIDE 25 MG/1
TABLET ORAL
Qty: 30 TABLET | Refills: 0 | Status: SHIPPED | OUTPATIENT
Start: 2018-07-14 | End: 2018-11-07 | Stop reason: SDUPTHER

## 2018-07-16 RX ORDER — LORAZEPAM 0.5 MG/1
TABLET ORAL
Qty: 40 TABLET | Refills: 0 | Status: SHIPPED | OUTPATIENT
Start: 2018-07-16 | End: 2018-08-15

## 2018-07-16 NOTE — TELEPHONE ENCOUNTER
Rolf called requesting a refill of the below medication which has been pended for you:     Requested Prescriptions     Pending Prescriptions Disp Refills    LORazepam (ATIVAN) 0.5 MG tablet [Pharmacy Med Name: LORAZEPAM 0.5MG     TAB] 40 tablet 0     Sig: TAKE 1 TABLET BY MOUTH TWICE DAILY AS NEEDED FOR ANXIETY       Last Appointment Date: 6/1/2018  Next Appointment Date: 7/13/2018    Allergies   Allergen Reactions    Latex     Alcohol      liquid  liquid    Astelin [Azelastine Hcl]      solution  solution    Ciprofloxacin Hcl Swelling    Fluticasone Propionate      suspension  suspension    Hydroquinone      cream    Livalo [Pitavastatin]      tablets    Other      seafood  seafood    Vitamin B Complex [B-100]      Vit B 3 tablets  Vit B 3 tablets

## 2018-08-10 DIAGNOSIS — H81.13 BENIGN PAROXYSMAL POSITIONAL VERTIGO DUE TO BILATERAL VESTIBULAR DISORDER: ICD-10-CM

## 2018-08-10 DIAGNOSIS — R73.01 IMPAIRED FASTING GLUCOSE: ICD-10-CM

## 2018-08-10 DIAGNOSIS — E55.9 VITAMIN D DEFICIENCY: ICD-10-CM

## 2018-08-10 LAB
ALBUMIN SERPL-MCNC: 4.3 G/DL (ref 3.5–5.2)
ALP BLD-CCNC: 87 U/L (ref 35–104)
ALT SERPL-CCNC: 12 U/L (ref 5–33)
ANION GAP SERPL CALCULATED.3IONS-SCNC: 16 MMOL/L (ref 7–19)
AST SERPL-CCNC: 13 U/L (ref 5–32)
BILIRUB SERPL-MCNC: 0.4 MG/DL (ref 0.2–1.2)
BUN BLDV-MCNC: 14 MG/DL (ref 8–23)
CALCIUM SERPL-MCNC: 9.8 MG/DL (ref 8.8–10.2)
CHLORIDE BLD-SCNC: 103 MMOL/L (ref 98–111)
CO2: 24 MMOL/L (ref 22–29)
CREAT SERPL-MCNC: 0.6 MG/DL (ref 0.5–0.9)
GFR NON-AFRICAN AMERICAN: >60
GLUCOSE BLD-MCNC: 109 MG/DL (ref 74–109)
HBA1C MFR BLD: 6 % (ref 4–6)
POTASSIUM SERPL-SCNC: 4.3 MMOL/L (ref 3.5–5)
SODIUM BLD-SCNC: 143 MMOL/L (ref 136–145)
TOTAL PROTEIN: 7 G/DL (ref 6.6–8.7)
VITAMIN D 25-HYDROXY: 51.9 NG/ML

## 2018-08-29 ENCOUNTER — OFFICE VISIT (OUTPATIENT)
Dept: INTERNAL MEDICINE | Age: 73
End: 2018-08-29
Payer: MEDICARE

## 2018-08-29 ENCOUNTER — HOSPITAL ENCOUNTER (OUTPATIENT)
Dept: GENERAL RADIOLOGY | Age: 73
Discharge: HOME | End: 2018-08-29
Payer: MEDICARE

## 2018-08-29 VITALS
BODY MASS INDEX: 24.83 KG/M2 | DIASTOLIC BLOOD PRESSURE: 70 MMHG | HEART RATE: 72 BPM | SYSTOLIC BLOOD PRESSURE: 126 MMHG | HEIGHT: 65 IN | WEIGHT: 149 LBS | OXYGEN SATURATION: 98 %

## 2018-08-29 DIAGNOSIS — F41.1 GENERALIZED ANXIETY DISORDER: ICD-10-CM

## 2018-08-29 DIAGNOSIS — M25.552 LEFT HIP PAIN: ICD-10-CM

## 2018-08-29 DIAGNOSIS — Z12.31 ENCOUNTER FOR SCREENING MAMMOGRAM FOR BREAST CANCER: ICD-10-CM

## 2018-08-29 DIAGNOSIS — G47.00 PERSISTENT INSOMNIA: ICD-10-CM

## 2018-08-29 DIAGNOSIS — R73.01 IMPAIRED FASTING GLUCOSE: ICD-10-CM

## 2018-08-29 DIAGNOSIS — E55.9 VITAMIN D DEFICIENCY: ICD-10-CM

## 2018-08-29 DIAGNOSIS — E78.00 PURE HYPERCHOLESTEROLEMIA: Primary | ICD-10-CM

## 2018-08-29 PROCEDURE — 1101F PT FALLS ASSESS-DOCD LE1/YR: CPT | Performed by: INTERNAL MEDICINE

## 2018-08-29 PROCEDURE — G8598 ASA/ANTIPLAT THER USED: HCPCS | Performed by: INTERNAL MEDICINE

## 2018-08-29 PROCEDURE — 73502 X-RAY EXAM HIP UNI 2-3 VIEWS: CPT

## 2018-08-29 PROCEDURE — G8400 PT W/DXA NO RESULTS DOC: HCPCS | Performed by: INTERNAL MEDICINE

## 2018-08-29 PROCEDURE — 1090F PRES/ABSN URINE INCON ASSESS: CPT | Performed by: INTERNAL MEDICINE

## 2018-08-29 PROCEDURE — 3017F COLORECTAL CA SCREEN DOC REV: CPT | Performed by: INTERNAL MEDICINE

## 2018-08-29 PROCEDURE — G8427 DOCREV CUR MEDS BY ELIG CLIN: HCPCS | Performed by: INTERNAL MEDICINE

## 2018-08-29 PROCEDURE — 1036F TOBACCO NON-USER: CPT | Performed by: INTERNAL MEDICINE

## 2018-08-29 PROCEDURE — 1123F ACP DISCUSS/DSCN MKR DOCD: CPT | Performed by: INTERNAL MEDICINE

## 2018-08-29 PROCEDURE — G8420 CALC BMI NORM PARAMETERS: HCPCS | Performed by: INTERNAL MEDICINE

## 2018-08-29 PROCEDURE — 99214 OFFICE O/P EST MOD 30 MIN: CPT | Performed by: INTERNAL MEDICINE

## 2018-08-29 PROCEDURE — 4040F PNEUMOC VAC/ADMIN/RCVD: CPT | Performed by: INTERNAL MEDICINE

## 2018-08-29 ASSESSMENT — ENCOUNTER SYMPTOMS
WHEEZING: 0
ABDOMINAL PAIN: 0
COUGH: 0
CHEST TIGHTNESS: 0
CONSTIPATION: 0
SORE THROAT: 0

## 2018-08-29 NOTE — PROGRESS NOTES
Chief Complaint   Patient presents with    Hypertension     3 month follow up     History of presenting illness:  Derrick Finch is a 75 y.o. female who presents today for follow up on her chronic medical conditions as noted below.     Pure hypercholesterolemia-she has tried to stay on diet, she has not been able to tolerate any lipid lowering medications in the past     Impaired fasting glucose-has tried to stay on diet and avoid concentrated sweets     Vitamin D deficiency-she takes vitamin D supplement 50,000 IU weekly     Persistent insomnia-she takes Sonata at bedtime to help her sleep     Generalized anxiety disorder- she has occasional panic attacks, takes occasional Ativan tablet when necessary only      Last 6 mo left posterior hip area pains-she States that she fell on a construction site about 6 months ago and now at times this area bothers her   Patient Active Problem List    Diagnosis Date Noted    Unspecified hearing loss, bilateral 06/04/2018    Tinnitus aurium, bilateral 06/04/2018    Vertigo, benign paroxysmal, right 06/04/2018    Benign paroxysmal positional vertigo due to bilateral vestibular disorder 05/15/2018    Allergic rhinitis 08/10/2017    Attention deficit disorder (ADD) without hyperactivity 08/10/2017    Auditory hallucinations 08/10/2017    Carotid atherosclerosis 08/10/2017    Cervicalgia 08/10/2017    Degeneration of cervical intervertebral disc 08/10/2017    Disc degeneration, lumbosacral 08/10/2017    Dizziness 08/10/2017    Dysuria 08/10/2017    Hypercalcemia 08/10/2017    Hyperthyroidism 08/10/2017    Lactose intolerance 08/10/2017    Menopausal symptoms 08/10/2017    Microhematuria 08/10/2017    Palpitations 08/10/2017    Panic disorder without agoraphobia 08/10/2017    Paroxysmal hypertension 08/10/2017    Peripheral neuropathy 08/10/2017    Urine incontinence 08/10/2017    Pure hypercholesterolemia 08/05/2017    Generalized anxiety disorder 08/05/2017  Vitamin D deficiency 08/05/2017    Persistent insomnia 08/05/2017    Impaired fasting glucose 08/05/2017    Carotid artery stenosis 08/05/2017     Overview Note:     Rt carotid 50%; repeat 2014 internal ok/ external  At 50%      Osteopenia of lower leg 08/05/2017     Overview Note:     12/15  Hip neck -1.9/ other nl      Fatigue 08/05/2017    Chronic vertigo 08/05/2017     Overview Note:     All eval in past has been negative      Nontoxic multinodular goiter 08/05/2017    Special screening for malignant neoplasms, colon 08/05/2017     Overview Note:     2017: appt was cancelled      OA (osteoarthritis) of knee 08/05/2017     Past Medical History:   Diagnosis Date    ADD (attention deficit disorder)     Carotid atherosclerosis     Degeneration of cervical intervertebral disc     Mixed hyperlipidemia     Nontoxic multinodular goiter     OA (osteoarthritis)     Osteopenia     Panic disorder without agoraphobia     Peripheral neuropathy     Pure hypercholesterolemia     Urinary incontinence     Vitamin D deficiency       Past Surgical History:   Procedure Laterality Date    COLONOSCOPY      HYSTERECTOMY, TOTAL ABDOMINAL      w/removal of both ovaries    JOINT REPLACEMENT      knee    TOTAL KNEE ARTHROPLASTY Right 2011    TOTAL KNEE ARTHROPLASTY Left 2012    VAGINA SURGERY      Abdomino-Vaginal Vesical Neck Suspension     Current Outpatient Prescriptions   Medication Sig Dispense Refill    meclizine (ANTIVERT) 25 MG tablet TAKE 1 TABLET BY MOUTH THREE TIMES DAILY AS NEEDED FOR DIZZINESS 30 tablet 0    vitamin D (ERGOCALCIFEROL) 78290 units CAPS capsule Take 1 capsule by mouth once a week 4 capsule 3    tretinoin (RETIN-A) 0.1 % cream Apply topically nightly.  20 g 1    cetirizine (ZYRTEC) 10 MG tablet Take 10 mg by mouth daily      Ascorbic Acid (VITAMIN C) 500 MG tablet Take 500 mg by mouth daily      Coenzyme Q10 (CO Q-10) 200 MG CAPS Take 1 capsule by mouth daily       Chromium exercise instructions to pt    Encounter for screening mammogram for breast cancer  -     [facility] Digital Screen Bilateral [[id number]]; Future              Orders Placed This Encounter   Procedures    NICOLE Digital Screen Bilateral [[id number]]    Hemoglobin A1C    Comprehensive Metabolic Panel    Lipid Panel    Vitamin D 25 Hydroxy    CBC Auto Differential    TSH without Reflex    Urinalysis     New Prescriptions    No medications on file        Return in about 4 months (around 1/7/2019) for Annual Physical.   There are no Patient Instructions on file for this visit. EMR Dragon/transcription disclaimer:Significant part of this  encounter note is electronic transcription/translation of spoken language to printed text. The electronic translation of spoken language may be erroneous, or at times, nonsensical words or phrases may be inadvertently transcribed.  Although I have reviewed the note for such errors, some may still exist.

## 2018-08-31 ENCOUNTER — TRANSCRIBE ORDERS (OUTPATIENT)
Dept: ADMINISTRATIVE | Facility: HOSPITAL | Age: 73
End: 2018-08-31

## 2018-08-31 DIAGNOSIS — Z12.31 ENCOUNTER FOR SCREENING MAMMOGRAM FOR MALIGNANT NEOPLASM OF BREAST: Primary | ICD-10-CM

## 2018-09-03 DIAGNOSIS — F41.1 GENERALIZED ANXIETY DISORDER: Primary | ICD-10-CM

## 2018-09-04 RX ORDER — LORAZEPAM 0.5 MG/1
TABLET ORAL
Qty: 40 TABLET | Refills: 0 | Status: SHIPPED | OUTPATIENT
Start: 2018-09-04 | End: 2018-10-04

## 2018-09-04 NOTE — TELEPHONE ENCOUNTER
Requested Prescriptions     Pending Prescriptions Disp Refills    LORazepam (ATIVAN) 0.5 MG tablet [Pharmacy Med Name: LORAZEPAM 0.5MG     TAB] 40 tablet 0     Sig: TAKE 1 TABLET BY MOUTH TWICE DAILY AS NEEDED FOR ANXIETY

## 2018-09-10 ENCOUNTER — APPOINTMENT (OUTPATIENT)
Dept: MAMMOGRAPHY | Facility: HOSPITAL | Age: 73
End: 2018-09-10
Attending: INTERNAL MEDICINE

## 2018-09-11 ENCOUNTER — HOSPITAL ENCOUNTER (OUTPATIENT)
Dept: GENERAL RADIOLOGY | Facility: HOSPITAL | Age: 73
Discharge: HOME | End: 2018-09-11
Attending: INTERNAL MEDICINE

## 2018-09-11 ENCOUNTER — HOSPITAL ENCOUNTER (OUTPATIENT)
Dept: MAMMOGRAPHY | Facility: HOSPITAL | Age: 73
Discharge: HOME | End: 2018-09-11
Attending: INTERNAL MEDICINE | Admitting: INTERNAL MEDICINE

## 2018-09-11 ENCOUNTER — TRANSCRIBE ORDERS (OUTPATIENT)
Dept: ADMINISTRATIVE | Facility: HOSPITAL | Age: 73
End: 2018-09-11

## 2018-09-11 DIAGNOSIS — Z12.31 SCREENING MAMMOGRAM, ENCOUNTER FOR: ICD-10-CM

## 2018-09-11 DIAGNOSIS — M25.552 LEFT HIP PAIN: Primary | ICD-10-CM

## 2018-09-11 DIAGNOSIS — Z12.31 ENCOUNTER FOR SCREENING MAMMOGRAM FOR MALIGNANT NEOPLASM OF BREAST: ICD-10-CM

## 2018-09-11 PROCEDURE — 73502 X-RAY EXAM HIP UNI 2-3 VIEWS: CPT

## 2018-09-11 PROCEDURE — 77067 SCR MAMMO BI INCL CAD: CPT

## 2018-09-11 PROCEDURE — 77063 BREAST TOMOSYNTHESIS BI: CPT

## 2018-09-26 PROBLEM — Z12.11 SPECIAL SCREENING FOR MALIGNANT NEOPLASMS, COLON: Status: RESOLVED | Noted: 2017-08-05 | Resolved: 2018-09-26

## 2018-10-19 ENCOUNTER — HOSPITAL ENCOUNTER (EMERGENCY)
Facility: HOSPITAL | Age: 73
Discharge: HOME | End: 2018-10-19
Attending: EMERGENCY MEDICINE | Admitting: EMERGENCY MEDICINE

## 2018-10-19 ENCOUNTER — APPOINTMENT (OUTPATIENT)
Dept: CT IMAGING | Facility: HOSPITAL | Age: 73
End: 2018-10-19

## 2018-10-19 VITALS
DIASTOLIC BLOOD PRESSURE: 79 MMHG | RESPIRATION RATE: 16 BRPM | HEIGHT: 65 IN | OXYGEN SATURATION: 98 % | HEART RATE: 71 BPM | WEIGHT: 144 LBS | SYSTOLIC BLOOD PRESSURE: 146 MMHG | TEMPERATURE: 97.4 F | BODY MASS INDEX: 23.99 KG/M2

## 2018-10-19 DIAGNOSIS — N20.0 KIDNEY STONE: Primary | ICD-10-CM

## 2018-10-19 LAB
ALBUMIN SERPL-MCNC: 4.4 G/DL (ref 3.5–5)
ALBUMIN/GLOB SERPL: 1.6 G/DL (ref 1.1–2.5)
ALP SERPL-CCNC: 84 U/L (ref 24–120)
ALT SERPL W P-5'-P-CCNC: 15 U/L (ref 0–54)
ANION GAP SERPL CALCULATED.3IONS-SCNC: 11 MMOL/L (ref 4–13)
APTT PPP: 26.7 SECONDS (ref 24.1–34.8)
AST SERPL-CCNC: 23 U/L (ref 7–45)
BASOPHILS # BLD AUTO: 0.07 10*3/MM3 (ref 0–0.2)
BASOPHILS NFR BLD AUTO: 0.6 % (ref 0–2)
BILIRUB SERPL-MCNC: 0.6 MG/DL (ref 0.1–1)
BUN BLD-MCNC: 12 MG/DL (ref 5–21)
BUN/CREAT SERPL: 16.9 (ref 7–25)
CALCIUM SPEC-SCNC: 9.3 MG/DL (ref 8.4–10.4)
CHLORIDE SERPL-SCNC: 104 MMOL/L (ref 98–110)
CO2 SERPL-SCNC: 25 MMOL/L (ref 24–31)
CREAT BLD-MCNC: 0.71 MG/DL (ref 0.5–1.4)
DEPRECATED RDW RBC AUTO: 41.1 FL (ref 40–54)
EOSINOPHIL # BLD AUTO: 0 10*3/MM3 (ref 0–0.7)
EOSINOPHIL NFR BLD AUTO: 0 % (ref 0–4)
ERYTHROCYTE [DISTWIDTH] IN BLOOD BY AUTOMATED COUNT: 13.2 % (ref 12–15)
GFR SERPL CREATININE-BSD FRML MDRD: 80 ML/MIN/1.73
GLOBULIN UR ELPH-MCNC: 2.8 GM/DL
GLUCOSE BLD-MCNC: 166 MG/DL (ref 70–100)
HCT VFR BLD AUTO: 41.6 % (ref 37–47)
HGB BLD-MCNC: 14.1 G/DL (ref 12–16)
IMM GRANULOCYTES # BLD: 0.07 10*3/MM3 (ref 0–0.03)
IMM GRANULOCYTES NFR BLD: 0.6 % (ref 0–5)
INR PPP: 0.85 (ref 0.91–1.09)
LIPASE SERPL-CCNC: 37 U/L (ref 23–203)
LYMPHOCYTES # BLD AUTO: 0.87 10*3/MM3 (ref 0.72–4.86)
LYMPHOCYTES NFR BLD AUTO: 8 % (ref 15–45)
MCH RBC QN AUTO: 29 PG (ref 28–32)
MCHC RBC AUTO-ENTMCNC: 33.9 G/DL (ref 33–36)
MCV RBC AUTO: 85.6 FL (ref 82–98)
MONOCYTES # BLD AUTO: 0.19 10*3/MM3 (ref 0.19–1.3)
MONOCYTES NFR BLD AUTO: 1.7 % (ref 4–12)
NEUTROPHILS # BLD AUTO: 9.7 10*3/MM3 (ref 1.87–8.4)
NEUTROPHILS NFR BLD AUTO: 89.1 % (ref 39–78)
NRBC BLD MANUAL-RTO: 0 /100 WBC (ref 0–0)
PLATELET # BLD AUTO: 247 10*3/MM3 (ref 130–400)
PMV BLD AUTO: 11 FL (ref 6–12)
POTASSIUM BLD-SCNC: 4.2 MMOL/L (ref 3.5–5.3)
PROT SERPL-MCNC: 7.2 G/DL (ref 6.3–8.7)
PROTHROMBIN TIME: 11.9 SECONDS (ref 11.9–14.6)
RBC # BLD AUTO: 4.86 10*6/MM3 (ref 4.2–5.4)
SODIUM BLD-SCNC: 140 MMOL/L (ref 135–145)
WBC NRBC COR # BLD: 10.9 10*3/MM3 (ref 4.8–10.8)

## 2018-10-19 PROCEDURE — 85610 PROTHROMBIN TIME: CPT | Performed by: EMERGENCY MEDICINE

## 2018-10-19 PROCEDURE — 74177 CT ABD & PELVIS W/CONTRAST: CPT

## 2018-10-19 PROCEDURE — 85025 COMPLETE CBC W/AUTO DIFF WBC: CPT | Performed by: EMERGENCY MEDICINE

## 2018-10-19 PROCEDURE — 25010000002 DICYCLOMINE PER 20 MG: Performed by: EMERGENCY MEDICINE

## 2018-10-19 PROCEDURE — 85730 THROMBOPLASTIN TIME PARTIAL: CPT | Performed by: EMERGENCY MEDICINE

## 2018-10-19 PROCEDURE — 96374 THER/PROPH/DIAG INJ IV PUSH: CPT

## 2018-10-19 PROCEDURE — 80053 COMPREHEN METABOLIC PANEL: CPT | Performed by: EMERGENCY MEDICINE

## 2018-10-19 PROCEDURE — 25010000002 ONDANSETRON PER 1 MG: Performed by: EMERGENCY MEDICINE

## 2018-10-19 PROCEDURE — 96361 HYDRATE IV INFUSION ADD-ON: CPT

## 2018-10-19 PROCEDURE — 25010000002 IOPAMIDOL 61 % SOLUTION: Performed by: EMERGENCY MEDICINE

## 2018-10-19 PROCEDURE — 25010000002 KETOROLAC TROMETHAMINE PER 15 MG: Performed by: EMERGENCY MEDICINE

## 2018-10-19 PROCEDURE — 83690 ASSAY OF LIPASE: CPT | Performed by: EMERGENCY MEDICINE

## 2018-10-19 PROCEDURE — 96372 THER/PROPH/DIAG INJ SC/IM: CPT

## 2018-10-19 PROCEDURE — 99284 EMERGENCY DEPT VISIT MOD MDM: CPT

## 2018-10-19 PROCEDURE — 96375 TX/PRO/DX INJ NEW DRUG ADDON: CPT

## 2018-10-19 RX ORDER — TAMSULOSIN HYDROCHLORIDE 0.4 MG/1
0.4 CAPSULE ORAL DAILY
Status: DISCONTINUED | OUTPATIENT
Start: 2018-10-20 | End: 2018-10-19

## 2018-10-19 RX ORDER — ONDANSETRON 2 MG/ML
4 INJECTION INTRAMUSCULAR; INTRAVENOUS ONCE
Status: COMPLETED | OUTPATIENT
Start: 2018-10-19 | End: 2018-10-19

## 2018-10-19 RX ORDER — IBUPROFEN 600 MG/1
600 TABLET ORAL EVERY 8 HOURS PRN
Qty: 30 TABLET | Refills: 0 | Status: SHIPPED | OUTPATIENT
Start: 2018-10-19

## 2018-10-19 RX ORDER — ONDANSETRON 4 MG/1
4 TABLET, ORALLY DISINTEGRATING ORAL EVERY 8 HOURS PRN
Qty: 21 TABLET | Refills: 0 | Status: SHIPPED | OUTPATIENT
Start: 2018-10-19 | End: 2019-11-08

## 2018-10-19 RX ORDER — DICYCLOMINE HYDROCHLORIDE 10 MG/ML
20 INJECTION INTRAMUSCULAR ONCE
Status: COMPLETED | OUTPATIENT
Start: 2018-10-19 | End: 2018-10-19

## 2018-10-19 RX ORDER — TAMSULOSIN HYDROCHLORIDE 0.4 MG/1
0.4 CAPSULE ORAL ONCE
Status: COMPLETED | OUTPATIENT
Start: 2018-10-19 | End: 2018-10-19

## 2018-10-19 RX ORDER — POLYETHYLENE GLYCOL 3350 17 G/17G
17 POWDER, FOR SOLUTION ORAL DAILY
Qty: 30 EACH | Refills: 0 | Status: SHIPPED | OUTPATIENT
Start: 2018-10-19 | End: 2019-11-08

## 2018-10-19 RX ORDER — TAMSULOSIN HYDROCHLORIDE 0.4 MG/1
1 CAPSULE ORAL DAILY
Qty: 7 CAPSULE | Refills: 0 | Status: SHIPPED | OUTPATIENT
Start: 2018-10-19 | End: 2019-11-08

## 2018-10-19 RX ORDER — KETOROLAC TROMETHAMINE 15 MG/ML
15 INJECTION, SOLUTION INTRAMUSCULAR; INTRAVENOUS ONCE
Status: COMPLETED | OUTPATIENT
Start: 2018-10-19 | End: 2018-10-19

## 2018-10-19 RX ADMIN — KETOROLAC TROMETHAMINE 15 MG: 15 INJECTION, SOLUTION INTRAMUSCULAR; INTRAVENOUS at 21:56

## 2018-10-19 RX ADMIN — DICYCLOMINE HYDROCHLORIDE 20 MG: 20 INJECTION, SOLUTION INTRAMUSCULAR at 19:31

## 2018-10-19 RX ADMIN — SODIUM CHLORIDE 1000 ML: 9 INJECTION, SOLUTION INTRAVENOUS at 19:31

## 2018-10-19 RX ADMIN — IOPAMIDOL 100 ML: 612 INJECTION, SOLUTION INTRAVENOUS at 20:32

## 2018-10-19 RX ADMIN — TAMSULOSIN HYDROCHLORIDE 0.4 MG: 0.4 CAPSULE ORAL at 22:21

## 2018-10-19 RX ADMIN — ONDANSETRON 4 MG: 2 INJECTION INTRAMUSCULAR; INTRAVENOUS at 19:31

## 2018-10-26 ENCOUNTER — TELEPHONE (OUTPATIENT)
Dept: UROLOGY | Facility: CLINIC | Age: 73
End: 2018-10-26

## 2018-10-26 DIAGNOSIS — N20.0 KIDNEY STONE: Primary | ICD-10-CM

## 2018-10-29 PROBLEM — N20.0 KIDNEY STONE: Status: ACTIVE | Noted: 2018-10-29

## 2018-10-31 ENCOUNTER — OFFICE VISIT (OUTPATIENT)
Dept: UROLOGY | Facility: CLINIC | Age: 73
End: 2018-10-31

## 2018-10-31 ENCOUNTER — HOSPITAL ENCOUNTER (EMERGENCY)
Facility: HOSPITAL | Age: 73
Discharge: HOME | End: 2018-11-01
Attending: EMERGENCY MEDICINE | Admitting: EMERGENCY MEDICINE

## 2018-10-31 ENCOUNTER — HOSPITAL ENCOUNTER (OUTPATIENT)
Dept: GENERAL RADIOLOGY | Facility: HOSPITAL | Age: 73
Discharge: HOME | End: 2018-10-31
Attending: UROLOGY | Admitting: UROLOGY

## 2018-10-31 VITALS
OXYGEN SATURATION: 95 % | SYSTOLIC BLOOD PRESSURE: 150 MMHG | WEIGHT: 152.3 LBS | TEMPERATURE: 97.5 F | HEIGHT: 65 IN | RESPIRATION RATE: 17 BRPM | DIASTOLIC BLOOD PRESSURE: 84 MMHG | HEART RATE: 70 BPM | BODY MASS INDEX: 25.37 KG/M2

## 2018-10-31 VITALS — TEMPERATURE: 98.7 F | WEIGHT: 150.8 LBS | HEIGHT: 64 IN | BODY MASS INDEX: 25.74 KG/M2

## 2018-10-31 DIAGNOSIS — N20.1 LEFT URETERAL STONE: Primary | ICD-10-CM

## 2018-10-31 DIAGNOSIS — S05.02XA ABRASION OF LEFT CORNEA, INITIAL ENCOUNTER: Primary | ICD-10-CM

## 2018-10-31 DIAGNOSIS — N20.0 KIDNEY STONE: ICD-10-CM

## 2018-10-31 LAB
BILIRUB BLD-MCNC: NEGATIVE MG/DL
CLARITY, POC: CLEAR
COLOR UR: YELLOW
GLUCOSE UR STRIP-MCNC: NEGATIVE MG/DL
KETONES UR QL: NEGATIVE
LEUKOCYTE EST, POC: NEGATIVE
NITRITE UR-MCNC: NEGATIVE MG/ML
PH UR: 5 [PH] (ref 5–8)
PROT UR STRIP-MCNC: NEGATIVE MG/DL
RBC # UR STRIP: ABNORMAL /UL
SP GR UR: 1.03 (ref 1–1.03)
UROBILINOGEN UR QL: NORMAL

## 2018-10-31 PROCEDURE — 81001 URINALYSIS AUTO W/SCOPE: CPT | Performed by: UROLOGY

## 2018-10-31 PROCEDURE — 99214 OFFICE O/P EST MOD 30 MIN: CPT | Performed by: UROLOGY

## 2018-10-31 PROCEDURE — 74018 RADEX ABDOMEN 1 VIEW: CPT

## 2018-10-31 PROCEDURE — 99283 EMERGENCY DEPT VISIT LOW MDM: CPT

## 2018-11-01 RX ORDER — TOBRAMYCIN 3 MG/ML
1 SOLUTION/ DROPS OPHTHALMIC EVERY 6 HOURS SCHEDULED
Status: DISCONTINUED | OUTPATIENT
Start: 2018-11-01 | End: 2018-11-01 | Stop reason: HOSPADM

## 2018-11-01 RX ADMIN — TOBRAMYCIN 1 DROP: 3 SOLUTION/ DROPS OPHTHALMIC at 00:23

## 2018-11-02 ENCOUNTER — HOSPITAL ENCOUNTER (EMERGENCY)
Facility: HOSPITAL | Age: 73
Discharge: HOME | End: 2018-11-02
Admitting: EMERGENCY MEDICINE

## 2018-11-02 ENCOUNTER — APPOINTMENT (OUTPATIENT)
Dept: GENERAL RADIOLOGY | Facility: HOSPITAL | Age: 73
End: 2018-11-02

## 2018-11-02 VITALS
TEMPERATURE: 97.8 F | WEIGHT: 158.9 LBS | BODY MASS INDEX: 27.13 KG/M2 | HEART RATE: 69 BPM | RESPIRATION RATE: 14 BRPM | SYSTOLIC BLOOD PRESSURE: 117 MMHG | HEIGHT: 64 IN | OXYGEN SATURATION: 95 % | DIASTOLIC BLOOD PRESSURE: 60 MMHG

## 2018-11-02 DIAGNOSIS — R42 DIZZINESS: Primary | ICD-10-CM

## 2018-11-02 LAB
ALBUMIN SERPL-MCNC: 4.5 G/DL (ref 3.5–5)
ALBUMIN/GLOB SERPL: 1.8 G/DL (ref 1.1–2.5)
ALP SERPL-CCNC: 75 U/L (ref 24–120)
ALT SERPL W P-5'-P-CCNC: 21 U/L (ref 0–54)
ANION GAP SERPL CALCULATED.3IONS-SCNC: 14 MMOL/L (ref 4–13)
AST SERPL-CCNC: 21 U/L (ref 7–45)
BACTERIA UR QL AUTO: NORMAL /HPF
BASOPHILS # BLD AUTO: 0.07 10*3/MM3 (ref 0–0.2)
BASOPHILS NFR BLD AUTO: 1.1 % (ref 0–2)
BILIRUB SERPL-MCNC: 0.9 MG/DL (ref 0.1–1)
BILIRUB UR QL STRIP: NEGATIVE
BUN BLD-MCNC: 13 MG/DL (ref 5–21)
BUN/CREAT SERPL: 20 (ref 7–25)
CALCIUM SPEC-SCNC: 10 MG/DL (ref 8.4–10.4)
CHLORIDE SERPL-SCNC: 106 MMOL/L (ref 98–110)
CLARITY UR: CLEAR
CO2 SERPL-SCNC: 24 MMOL/L (ref 24–31)
COLOR UR: YELLOW
CREAT BLD-MCNC: 0.65 MG/DL (ref 0.5–1.4)
DEPRECATED RDW RBC AUTO: 39.5 FL (ref 40–54)
EOSINOPHIL # BLD AUTO: 0.09 10*3/MM3 (ref 0–0.7)
EOSINOPHIL NFR BLD AUTO: 1.4 % (ref 0–4)
ERYTHROCYTE [DISTWIDTH] IN BLOOD BY AUTOMATED COUNT: 13.1 % (ref 12–15)
GFR SERPL CREATININE-BSD FRML MDRD: 89 ML/MIN/1.73
GLOBULIN UR ELPH-MCNC: 2.5 GM/DL
GLUCOSE BLD-MCNC: 105 MG/DL (ref 70–100)
GLUCOSE BLDC GLUCOMTR-MCNC: 133 MG/DL (ref 70–130)
GLUCOSE UR STRIP-MCNC: NEGATIVE MG/DL
HCT VFR BLD AUTO: 41 % (ref 37–47)
HGB BLD-MCNC: 14.2 G/DL (ref 12–16)
HGB UR QL STRIP.AUTO: ABNORMAL
HYALINE CASTS UR QL AUTO: NORMAL /LPF
IMM GRANULOCYTES # BLD: 0.02 10*3/MM3 (ref 0–0.03)
IMM GRANULOCYTES NFR BLD: 0.3 % (ref 0–5)
KETONES UR QL STRIP: NEGATIVE
LEUKOCYTE ESTERASE UR QL STRIP.AUTO: NEGATIVE
LYMPHOCYTES # BLD AUTO: 1.57 10*3/MM3 (ref 0.72–4.86)
LYMPHOCYTES NFR BLD AUTO: 25.3 % (ref 15–45)
MAGNESIUM SERPL-MCNC: 2 MG/DL (ref 1.4–2.2)
MCH RBC QN AUTO: 29.1 PG (ref 28–32)
MCHC RBC AUTO-ENTMCNC: 34.6 G/DL (ref 33–36)
MCV RBC AUTO: 84 FL (ref 82–98)
MONOCYTES # BLD AUTO: 0.45 10*3/MM3 (ref 0.19–1.3)
MONOCYTES NFR BLD AUTO: 7.2 % (ref 4–12)
NEUTROPHILS # BLD AUTO: 4.01 10*3/MM3 (ref 1.87–8.4)
NEUTROPHILS NFR BLD AUTO: 64.7 % (ref 39–78)
NITRITE UR QL STRIP: NEGATIVE
NRBC BLD MANUAL-RTO: 0 /100 WBC (ref 0–0)
PH UR STRIP.AUTO: 6.5 [PH] (ref 5–8)
PLATELET # BLD AUTO: 226 10*3/MM3 (ref 130–400)
PMV BLD AUTO: 11.1 FL (ref 6–12)
POTASSIUM BLD-SCNC: 3.4 MMOL/L (ref 3.5–5.3)
PROT SERPL-MCNC: 7 G/DL (ref 6.3–8.7)
PROT UR QL STRIP: NEGATIVE
RBC # BLD AUTO: 4.88 10*6/MM3 (ref 4.2–5.4)
RBC # UR: NORMAL /HPF
REF LAB TEST METHOD: NORMAL
SODIUM BLD-SCNC: 144 MMOL/L (ref 135–145)
SP GR UR STRIP: 1.01 (ref 1–1.03)
SQUAMOUS #/AREA URNS HPF: NORMAL /HPF
TROPONIN I SERPL-MCNC: <0.012 NG/ML (ref 0–0.03)
UROBILINOGEN UR QL STRIP: ABNORMAL
WBC NRBC COR # BLD: 6.21 10*3/MM3 (ref 4.8–10.8)
WBC UR QL AUTO: NORMAL /HPF

## 2018-11-02 PROCEDURE — 81001 URINALYSIS AUTO W/SCOPE: CPT | Performed by: PHYSICIAN ASSISTANT

## 2018-11-02 PROCEDURE — 99284 EMERGENCY DEPT VISIT MOD MDM: CPT

## 2018-11-02 PROCEDURE — 83735 ASSAY OF MAGNESIUM: CPT | Performed by: PHYSICIAN ASSISTANT

## 2018-11-02 PROCEDURE — 85025 COMPLETE CBC W/AUTO DIFF WBC: CPT | Performed by: PHYSICIAN ASSISTANT

## 2018-11-02 PROCEDURE — 84484 ASSAY OF TROPONIN QUANT: CPT | Performed by: PHYSICIAN ASSISTANT

## 2018-11-02 PROCEDURE — 93005 ELECTROCARDIOGRAM TRACING: CPT | Performed by: PHYSICIAN ASSISTANT

## 2018-11-02 PROCEDURE — 82962 GLUCOSE BLOOD TEST: CPT

## 2018-11-02 PROCEDURE — 71045 X-RAY EXAM CHEST 1 VIEW: CPT

## 2018-11-02 PROCEDURE — 93010 ELECTROCARDIOGRAM REPORT: CPT | Performed by: INTERNAL MEDICINE

## 2018-11-02 PROCEDURE — 80053 COMPREHEN METABOLIC PANEL: CPT | Performed by: PHYSICIAN ASSISTANT

## 2018-11-05 ENCOUNTER — HOSPITAL ENCOUNTER (EMERGENCY)
Facility: HOSPITAL | Age: 73
Discharge: HOME | End: 2018-11-05
Attending: EMERGENCY MEDICINE | Admitting: EMERGENCY MEDICINE

## 2018-11-05 ENCOUNTER — APPOINTMENT (OUTPATIENT)
Dept: CT IMAGING | Facility: HOSPITAL | Age: 73
End: 2018-11-05

## 2018-11-05 VITALS
SYSTOLIC BLOOD PRESSURE: 138 MMHG | HEART RATE: 68 BPM | TEMPERATURE: 97.6 F | HEIGHT: 65 IN | RESPIRATION RATE: 14 BRPM | OXYGEN SATURATION: 93 % | DIASTOLIC BLOOD PRESSURE: 73 MMHG | WEIGHT: 144 LBS | BODY MASS INDEX: 23.99 KG/M2

## 2018-11-05 DIAGNOSIS — E16.1 FASTING HYPOGLYCEMIA: ICD-10-CM

## 2018-11-05 DIAGNOSIS — R42 DIZZY: Primary | ICD-10-CM

## 2018-11-05 LAB
ALBUMIN SERPL-MCNC: 4.6 G/DL (ref 3.5–5)
ALBUMIN/GLOB SERPL: 1.6 G/DL (ref 1.1–2.5)
ALP SERPL-CCNC: 74 U/L (ref 24–120)
ALT SERPL W P-5'-P-CCNC: 15 U/L (ref 0–54)
ANION GAP SERPL CALCULATED.3IONS-SCNC: 14 MMOL/L (ref 4–13)
AST SERPL-CCNC: 19 U/L (ref 7–45)
BASOPHILS # BLD AUTO: 0.07 10*3/MM3 (ref 0–0.2)
BASOPHILS NFR BLD AUTO: 1.1 % (ref 0–2)
BILIRUB SERPL-MCNC: 0.4 MG/DL (ref 0.1–1)
BUN BLD-MCNC: 14 MG/DL (ref 5–21)
BUN/CREAT SERPL: 20.6 (ref 7–25)
CALCIUM SPEC-SCNC: 9.9 MG/DL (ref 8.4–10.4)
CHLORIDE SERPL-SCNC: 103 MMOL/L (ref 98–110)
CO2 SERPL-SCNC: 25 MMOL/L (ref 24–31)
CREAT BLD-MCNC: 0.68 MG/DL (ref 0.5–1.4)
DEPRECATED RDW RBC AUTO: 41.1 FL (ref 40–54)
EOSINOPHIL # BLD AUTO: 0.08 10*3/MM3 (ref 0–0.7)
EOSINOPHIL NFR BLD AUTO: 1.2 % (ref 0–4)
ERYTHROCYTE [DISTWIDTH] IN BLOOD BY AUTOMATED COUNT: 13.2 % (ref 12–15)
GFR SERPL CREATININE-BSD FRML MDRD: 85 ML/MIN/1.73
GLOBULIN UR ELPH-MCNC: 2.8 GM/DL
GLUCOSE BLD-MCNC: 113 MG/DL (ref 70–100)
HCT VFR BLD AUTO: 43.8 % (ref 37–47)
HGB BLD-MCNC: 15 G/DL (ref 12–16)
HOLD SPECIMEN: NORMAL
IMM GRANULOCYTES # BLD: 0.01 10*3/MM3 (ref 0–0.03)
IMM GRANULOCYTES NFR BLD: 0.2 % (ref 0–5)
LYMPHOCYTES # BLD AUTO: 2.15 10*3/MM3 (ref 0.72–4.86)
LYMPHOCYTES NFR BLD AUTO: 32.3 % (ref 15–45)
MCH RBC QN AUTO: 29.1 PG (ref 28–32)
MCHC RBC AUTO-ENTMCNC: 34.2 G/DL (ref 33–36)
MCV RBC AUTO: 84.9 FL (ref 82–98)
MONOCYTES # BLD AUTO: 0.46 10*3/MM3 (ref 0.19–1.3)
MONOCYTES NFR BLD AUTO: 6.9 % (ref 4–12)
NEUTROPHILS # BLD AUTO: 3.88 10*3/MM3 (ref 1.87–8.4)
NEUTROPHILS NFR BLD AUTO: 58.3 % (ref 39–78)
NRBC BLD MANUAL-RTO: 0 /100 WBC (ref 0–0)
PLATELET # BLD AUTO: 253 10*3/MM3 (ref 130–400)
PMV BLD AUTO: 11 FL (ref 6–12)
POTASSIUM BLD-SCNC: 3.7 MMOL/L (ref 3.5–5.3)
PROT SERPL-MCNC: 7.4 G/DL (ref 6.3–8.7)
RBC # BLD AUTO: 5.16 10*6/MM3 (ref 4.2–5.4)
SODIUM BLD-SCNC: 142 MMOL/L (ref 135–145)
WBC NRBC COR # BLD: 6.65 10*3/MM3 (ref 4.8–10.8)
WHOLE BLOOD HOLD SPECIMEN: NORMAL

## 2018-11-05 PROCEDURE — 93005 ELECTROCARDIOGRAM TRACING: CPT | Performed by: EMERGENCY MEDICINE

## 2018-11-05 PROCEDURE — 85025 COMPLETE CBC W/AUTO DIFF WBC: CPT | Performed by: EMERGENCY MEDICINE

## 2018-11-05 PROCEDURE — 99285 EMERGENCY DEPT VISIT HI MDM: CPT

## 2018-11-05 PROCEDURE — 93010 ELECTROCARDIOGRAM REPORT: CPT | Performed by: INTERNAL MEDICINE

## 2018-11-05 PROCEDURE — 80053 COMPREHEN METABOLIC PANEL: CPT | Performed by: EMERGENCY MEDICINE

## 2018-11-05 PROCEDURE — 70450 CT HEAD/BRAIN W/O DYE: CPT

## 2018-11-07 ENCOUNTER — OFFICE VISIT (OUTPATIENT)
Dept: INTERNAL MEDICINE | Age: 73
End: 2018-11-07
Payer: MEDICARE

## 2018-11-07 VITALS
RESPIRATION RATE: 18 BRPM | HEART RATE: 74 BPM | DIASTOLIC BLOOD PRESSURE: 70 MMHG | WEIGHT: 150 LBS | OXYGEN SATURATION: 95 % | BODY MASS INDEX: 24.96 KG/M2 | SYSTOLIC BLOOD PRESSURE: 118 MMHG

## 2018-11-07 DIAGNOSIS — F41.1 GENERALIZED ANXIETY DISORDER: ICD-10-CM

## 2018-11-07 DIAGNOSIS — R73.01 IMPAIRED FASTING GLUCOSE: ICD-10-CM

## 2018-11-07 DIAGNOSIS — E16.1 POSTPRANDIAL HYPOGLYCEMIA: ICD-10-CM

## 2018-11-07 DIAGNOSIS — R42 CHRONIC VERTIGO: Primary | ICD-10-CM

## 2018-11-07 DIAGNOSIS — H81.13 BENIGN PAROXYSMAL POSITIONAL VERTIGO DUE TO BILATERAL VESTIBULAR DISORDER: ICD-10-CM

## 2018-11-07 DIAGNOSIS — Z23 NEED FOR VACCINATION: ICD-10-CM

## 2018-11-07 LAB — HBA1C MFR BLD: 5.6 %

## 2018-11-07 PROCEDURE — 83036 HEMOGLOBIN GLYCOSYLATED A1C: CPT | Performed by: INTERNAL MEDICINE

## 2018-11-07 PROCEDURE — 99214 OFFICE O/P EST MOD 30 MIN: CPT | Performed by: INTERNAL MEDICINE

## 2018-11-07 PROCEDURE — 1090F PRES/ABSN URINE INCON ASSESS: CPT | Performed by: INTERNAL MEDICINE

## 2018-11-07 PROCEDURE — 4040F PNEUMOC VAC/ADMIN/RCVD: CPT | Performed by: INTERNAL MEDICINE

## 2018-11-07 PROCEDURE — G8598 ASA/ANTIPLAT THER USED: HCPCS | Performed by: INTERNAL MEDICINE

## 2018-11-07 PROCEDURE — G8427 DOCREV CUR MEDS BY ELIG CLIN: HCPCS | Performed by: INTERNAL MEDICINE

## 2018-11-07 PROCEDURE — G8420 CALC BMI NORM PARAMETERS: HCPCS | Performed by: INTERNAL MEDICINE

## 2018-11-07 PROCEDURE — G8482 FLU IMMUNIZE ORDER/ADMIN: HCPCS | Performed by: INTERNAL MEDICINE

## 2018-11-07 PROCEDURE — G8400 PT W/DXA NO RESULTS DOC: HCPCS | Performed by: INTERNAL MEDICINE

## 2018-11-07 PROCEDURE — G0008 ADMIN INFLUENZA VIRUS VAC: HCPCS | Performed by: INTERNAL MEDICINE

## 2018-11-07 PROCEDURE — 1036F TOBACCO NON-USER: CPT | Performed by: INTERNAL MEDICINE

## 2018-11-07 PROCEDURE — 1123F ACP DISCUSS/DSCN MKR DOCD: CPT | Performed by: INTERNAL MEDICINE

## 2018-11-07 PROCEDURE — 3017F COLORECTAL CA SCREEN DOC REV: CPT | Performed by: INTERNAL MEDICINE

## 2018-11-07 PROCEDURE — 90662 IIV NO PRSV INCREASED AG IM: CPT | Performed by: INTERNAL MEDICINE

## 2018-11-07 PROCEDURE — 1101F PT FALLS ASSESS-DOCD LE1/YR: CPT | Performed by: INTERNAL MEDICINE

## 2018-11-07 RX ORDER — ATORVASTATIN CALCIUM 20 MG/1
20 TABLET, FILM COATED ORAL DAILY
COMMUNITY
End: 2018-11-07 | Stop reason: SDUPTHER

## 2018-11-07 RX ORDER — ERGOCALCIFEROL 1.25 MG/1
50000 CAPSULE ORAL WEEKLY
Qty: 4 CAPSULE | Refills: 3 | Status: SHIPPED | OUTPATIENT
Start: 2018-11-07 | End: 2019-01-09 | Stop reason: SDUPTHER

## 2018-11-07 RX ORDER — TRETINOIN 1 MG/G
CREAM TOPICAL
Qty: 20 G | Refills: 1 | Status: SHIPPED | OUTPATIENT
Start: 2018-11-07 | End: 2021-02-24

## 2018-11-07 RX ORDER — ATORVASTATIN CALCIUM 20 MG/1
20 TABLET, FILM COATED ORAL DAILY
Qty: 30 TABLET | Refills: 5 | Status: SHIPPED | OUTPATIENT
Start: 2018-11-07 | End: 2019-01-09 | Stop reason: SDUPTHER

## 2018-11-07 RX ORDER — MECLIZINE HYDROCHLORIDE 25 MG/1
TABLET ORAL
Qty: 30 TABLET | Refills: 0 | Status: SHIPPED | OUTPATIENT
Start: 2018-11-07 | End: 2018-11-28 | Stop reason: SDUPTHER

## 2018-11-07 ASSESSMENT — ENCOUNTER SYMPTOMS
CHEST TIGHTNESS: 0
WHEEZING: 0
ABDOMINAL PAIN: 0
COUGH: 0
CONSTIPATION: 0
SORE THROAT: 0

## 2018-11-14 ENCOUNTER — APPOINTMENT (OUTPATIENT)
Dept: ULTRASOUND IMAGING | Facility: HOSPITAL | Age: 73
End: 2018-11-14
Attending: UROLOGY

## 2018-11-28 RX ORDER — MECLIZINE HYDROCHLORIDE 25 MG/1
TABLET ORAL
Qty: 30 TABLET | Refills: 0 | Status: SHIPPED | OUTPATIENT
Start: 2018-11-28 | End: 2019-01-09 | Stop reason: SDUPTHER

## 2018-11-28 NOTE — TELEPHONE ENCOUNTER
Rolf called requesting a refill of the below medication which has been pended for you:     Requested Prescriptions     Pending Prescriptions Disp Refills    meclizine (ANTIVERT) 25 MG tablet [Pharmacy Med Name: MECLIZINE 25MG      TAB] 30 tablet 0     Sig: TAKE 1 TABLET BY MOUTH THREE TIMES DAILY AS NEEDED FOR DIZZINESS       Last Appointment Date: 11/7/2018  Next Appointment Date: 1/9/2019    Allergies   Allergen Reactions    Latex     Alcohol      liquid  liquid    Astelin [Azelastine Hcl]      solution  solution    Ciprofloxacin Hcl Swelling    Fluticasone Propionate      suspension  suspension    Hydroquinone      cream    Livalo [Pitavastatin]      tablets    Other      seafood  seafood    Vitamin B Complex [B-100]      Vit B 3 tablets  Vit B 3 tablets

## 2018-12-03 ENCOUNTER — TELEPHONE (OUTPATIENT)
Dept: UROLOGY | Facility: CLINIC | Age: 73
End: 2018-12-03

## 2018-12-06 ENCOUNTER — HOSPITAL ENCOUNTER (OUTPATIENT)
Dept: ULTRASOUND IMAGING | Facility: HOSPITAL | Age: 73
Discharge: HOME | End: 2018-12-06
Attending: UROLOGY | Admitting: UROLOGY

## 2018-12-06 ENCOUNTER — OFFICE VISIT (OUTPATIENT)
Dept: UROLOGY | Facility: CLINIC | Age: 73
End: 2018-12-06

## 2018-12-06 VITALS — TEMPERATURE: 97.5 F | BODY MASS INDEX: 28.47 KG/M2 | HEIGHT: 60 IN | WEIGHT: 145 LBS

## 2018-12-06 DIAGNOSIS — N20.1 LEFT URETERAL STONE: Primary | ICD-10-CM

## 2018-12-06 DIAGNOSIS — N20.1 LEFT URETERAL STONE: ICD-10-CM

## 2018-12-06 LAB
BILIRUB BLD-MCNC: NEGATIVE MG/DL
CLARITY, POC: CLEAR
COLOR UR: YELLOW
GLUCOSE UR STRIP-MCNC: NEGATIVE MG/DL
KETONES UR QL: NEGATIVE
LEUKOCYTE EST, POC: NEGATIVE
NITRITE UR-MCNC: NEGATIVE MG/ML
PH UR: 6.5 [PH] (ref 5–8)
PROT UR STRIP-MCNC: NEGATIVE MG/DL
RBC # UR STRIP: ABNORMAL /UL
SP GR UR: 1.01 (ref 1–1.03)
UROBILINOGEN UR QL: NORMAL

## 2018-12-06 PROCEDURE — 81001 URINALYSIS AUTO W/SCOPE: CPT | Performed by: UROLOGY

## 2018-12-06 PROCEDURE — 76775 US EXAM ABDO BACK WALL LIM: CPT

## 2018-12-06 PROCEDURE — 99213 OFFICE O/P EST LOW 20 MIN: CPT | Performed by: UROLOGY

## 2019-01-02 RX ORDER — MECLIZINE HYDROCHLORIDE 25 MG/1
TABLET ORAL
Qty: 30 TABLET | Refills: 0 | Status: SHIPPED | OUTPATIENT
Start: 2019-01-02 | End: 2019-01-09

## 2019-01-04 DIAGNOSIS — R73.01 IMPAIRED FASTING GLUCOSE: ICD-10-CM

## 2019-01-04 DIAGNOSIS — F41.1 GENERALIZED ANXIETY DISORDER: ICD-10-CM

## 2019-01-04 DIAGNOSIS — G47.00 PERSISTENT INSOMNIA: ICD-10-CM

## 2019-01-04 DIAGNOSIS — E78.00 PURE HYPERCHOLESTEROLEMIA: ICD-10-CM

## 2019-01-04 DIAGNOSIS — Z12.31 ENCOUNTER FOR SCREENING MAMMOGRAM FOR BREAST CANCER: ICD-10-CM

## 2019-01-04 DIAGNOSIS — E55.9 VITAMIN D DEFICIENCY: ICD-10-CM

## 2019-01-04 LAB
ALBUMIN SERPL-MCNC: 4.3 G/DL (ref 3.5–5.2)
ALP BLD-CCNC: 105 U/L (ref 35–104)
ALT SERPL-CCNC: 15 U/L (ref 5–33)
ANION GAP SERPL CALCULATED.3IONS-SCNC: 15 MMOL/L (ref 7–19)
AST SERPL-CCNC: 14 U/L (ref 5–32)
BASOPHILS ABSOLUTE: 0.1 K/UL (ref 0–0.2)
BASOPHILS RELATIVE PERCENT: 1.1 % (ref 0–1)
BILIRUB SERPL-MCNC: 0.5 MG/DL (ref 0.2–1.2)
BILIRUBIN URINE: NEGATIVE
BLOOD, URINE: NEGATIVE
BUN BLDV-MCNC: 13 MG/DL (ref 8–23)
CALCIUM SERPL-MCNC: 10 MG/DL (ref 8.8–10.2)
CHLORIDE BLD-SCNC: 105 MMOL/L (ref 98–111)
CHOLESTEROL, TOTAL: 162 MG/DL (ref 160–199)
CLARITY: CLEAR
CO2: 25 MMOL/L (ref 22–29)
COLOR: YELLOW
CREAT SERPL-MCNC: 0.7 MG/DL (ref 0.5–0.9)
EOSINOPHILS ABSOLUTE: 0.1 K/UL (ref 0–0.6)
EOSINOPHILS RELATIVE PERCENT: 1.6 % (ref 0–5)
GFR NON-AFRICAN AMERICAN: >60
GLUCOSE BLD-MCNC: 117 MG/DL (ref 74–109)
GLUCOSE URINE: NEGATIVE MG/DL
HBA1C MFR BLD: 5.6 % (ref 4–6)
HCT VFR BLD CALC: 45.1 % (ref 37–47)
HDLC SERPL-MCNC: 51 MG/DL (ref 65–121)
HEMOGLOBIN: 14.2 G/DL (ref 12–16)
KETONES, URINE: NEGATIVE MG/DL
LDL CHOLESTEROL CALCULATED: 95 MG/DL
LEUKOCYTE ESTERASE, URINE: NEGATIVE
LYMPHOCYTES ABSOLUTE: 2.4 K/UL (ref 1.1–4.5)
LYMPHOCYTES RELATIVE PERCENT: 34.1 % (ref 20–40)
MCH RBC QN AUTO: 28.2 PG (ref 27–31)
MCHC RBC AUTO-ENTMCNC: 31.5 G/DL (ref 33–37)
MCV RBC AUTO: 89.5 FL (ref 81–99)
MONOCYTES ABSOLUTE: 0.5 K/UL (ref 0–0.9)
MONOCYTES RELATIVE PERCENT: 7.3 % (ref 0–10)
NEUTROPHILS ABSOLUTE: 3.9 K/UL (ref 1.5–7.5)
NEUTROPHILS RELATIVE PERCENT: 55.8 % (ref 50–65)
NITRITE, URINE: NEGATIVE
PDW BLD-RTO: 13.1 % (ref 11.5–14.5)
PH UA: 6
PLATELET # BLD: 227 K/UL (ref 130–400)
PMV BLD AUTO: 10.9 FL (ref 9.4–12.3)
POTASSIUM SERPL-SCNC: 4.7 MMOL/L (ref 3.5–5)
PROTEIN UA: NEGATIVE MG/DL
RBC # BLD: 5.04 M/UL (ref 4.2–5.4)
SODIUM BLD-SCNC: 145 MMOL/L (ref 136–145)
SPECIFIC GRAVITY UA: 1.01
TOTAL PROTEIN: 6.9 G/DL (ref 6.6–8.7)
TRIGL SERPL-MCNC: 80 MG/DL (ref 0–149)
TSH SERPL DL<=0.05 MIU/L-ACNC: 3.17 UIU/ML (ref 0.27–4.2)
UROBILINOGEN, URINE: 0.2 E.U./DL
VITAMIN D 25-HYDROXY: 46.6 NG/ML
WBC # BLD: 7 K/UL (ref 4.8–10.8)

## 2019-01-09 ENCOUNTER — OFFICE VISIT (OUTPATIENT)
Dept: INTERNAL MEDICINE | Age: 74
End: 2019-01-09
Payer: MEDICARE

## 2019-01-09 VITALS
SYSTOLIC BLOOD PRESSURE: 136 MMHG | OXYGEN SATURATION: 97 % | DIASTOLIC BLOOD PRESSURE: 78 MMHG | HEIGHT: 65 IN | WEIGHT: 148 LBS | HEART RATE: 70 BPM | BODY MASS INDEX: 24.66 KG/M2

## 2019-01-09 DIAGNOSIS — E55.9 VITAMIN D DEFICIENCY: ICD-10-CM

## 2019-01-09 DIAGNOSIS — M85.862 OSTEOPENIA OF BOTH LOWER LEGS: ICD-10-CM

## 2019-01-09 DIAGNOSIS — E05.90 HYPERTHYROIDISM: ICD-10-CM

## 2019-01-09 DIAGNOSIS — Z00.00 MEDICARE ANNUAL WELLNESS VISIT, SUBSEQUENT: Primary | ICD-10-CM

## 2019-01-09 DIAGNOSIS — F41.1 GENERALIZED ANXIETY DISORDER: ICD-10-CM

## 2019-01-09 DIAGNOSIS — E78.00 PURE HYPERCHOLESTEROLEMIA: ICD-10-CM

## 2019-01-09 DIAGNOSIS — R73.01 IMPAIRED FASTING GLUCOSE: ICD-10-CM

## 2019-01-09 DIAGNOSIS — M85.861 OSTEOPENIA OF BOTH LOWER LEGS: ICD-10-CM

## 2019-01-09 PROCEDURE — 4040F PNEUMOC VAC/ADMIN/RCVD: CPT | Performed by: INTERNAL MEDICINE

## 2019-01-09 PROCEDURE — 1090F PRES/ABSN URINE INCON ASSESS: CPT | Performed by: INTERNAL MEDICINE

## 2019-01-09 PROCEDURE — G8482 FLU IMMUNIZE ORDER/ADMIN: HCPCS | Performed by: INTERNAL MEDICINE

## 2019-01-09 PROCEDURE — 3017F COLORECTAL CA SCREEN DOC REV: CPT | Performed by: INTERNAL MEDICINE

## 2019-01-09 PROCEDURE — 99214 OFFICE O/P EST MOD 30 MIN: CPT | Performed by: INTERNAL MEDICINE

## 2019-01-09 PROCEDURE — G8400 PT W/DXA NO RESULTS DOC: HCPCS | Performed by: INTERNAL MEDICINE

## 2019-01-09 PROCEDURE — 1036F TOBACCO NON-USER: CPT | Performed by: INTERNAL MEDICINE

## 2019-01-09 PROCEDURE — G0439 PPPS, SUBSEQ VISIT: HCPCS | Performed by: INTERNAL MEDICINE

## 2019-01-09 PROCEDURE — 1101F PT FALLS ASSESS-DOCD LE1/YR: CPT | Performed by: INTERNAL MEDICINE

## 2019-01-09 PROCEDURE — G8427 DOCREV CUR MEDS BY ELIG CLIN: HCPCS | Performed by: INTERNAL MEDICINE

## 2019-01-09 PROCEDURE — G8598 ASA/ANTIPLAT THER USED: HCPCS | Performed by: INTERNAL MEDICINE

## 2019-01-09 PROCEDURE — G8420 CALC BMI NORM PARAMETERS: HCPCS | Performed by: INTERNAL MEDICINE

## 2019-01-09 PROCEDURE — 1123F ACP DISCUSS/DSCN MKR DOCD: CPT | Performed by: INTERNAL MEDICINE

## 2019-01-09 PROCEDURE — G0444 DEPRESSION SCREEN ANNUAL: HCPCS | Performed by: INTERNAL MEDICINE

## 2019-01-09 RX ORDER — ERGOCALCIFEROL 1.25 MG/1
50000 CAPSULE ORAL WEEKLY
Qty: 12 CAPSULE | Refills: 3 | Status: SHIPPED | OUTPATIENT
Start: 2019-01-09 | End: 2020-01-27

## 2019-01-09 RX ORDER — ATORVASTATIN CALCIUM 20 MG/1
20 TABLET, FILM COATED ORAL DAILY
Qty: 90 TABLET | Refills: 1 | Status: SHIPPED | OUTPATIENT
Start: 2019-01-09 | End: 2020-03-20

## 2019-01-09 RX ORDER — MECLIZINE HYDROCHLORIDE 25 MG/1
TABLET ORAL
Qty: 90 TABLET | Refills: 0 | Status: SHIPPED | OUTPATIENT
Start: 2019-01-09 | End: 2020-01-02

## 2019-01-09 ASSESSMENT — PATIENT HEALTH QUESTIONNAIRE - PHQ9: SUM OF ALL RESPONSES TO PHQ QUESTIONS 1-9: 5

## 2019-01-09 ASSESSMENT — ANXIETY QUESTIONNAIRES: GAD7 TOTAL SCORE: 0

## 2019-01-09 ASSESSMENT — ENCOUNTER SYMPTOMS
CHEST TIGHTNESS: 0
ABDOMINAL PAIN: 0
WHEEZING: 0
COLOR CHANGE: 0
DIARRHEA: 0
NAUSEA: 0
SINUS PRESSURE: 0
VOMITING: 0
SORE THROAT: 0
EYE PAIN: 0
EYE REDNESS: 0
VOICE CHANGE: 0
CONSTIPATION: 0
TROUBLE SWALLOWING: 0
BLOOD IN STOOL: 0
COUGH: 0

## 2019-01-09 ASSESSMENT — LIFESTYLE VARIABLES: HOW OFTEN DO YOU HAVE A DRINK CONTAINING ALCOHOL: 0

## 2019-02-08 PROBLEM — Z00.00 MEDICARE ANNUAL WELLNESS VISIT, SUBSEQUENT: Status: RESOLVED | Noted: 2017-10-31 | Resolved: 2019-02-08

## 2019-02-15 ENCOUNTER — TRANSCRIBE ORDERS (OUTPATIENT)
Dept: ADMINISTRATIVE | Facility: HOSPITAL | Age: 74
End: 2019-02-15

## 2019-02-15 ENCOUNTER — HOSPITAL ENCOUNTER (OUTPATIENT)
Dept: GENERAL RADIOLOGY | Facility: HOSPITAL | Age: 74
Discharge: HOME | End: 2019-02-15
Admitting: NURSE PRACTITIONER

## 2019-02-15 ENCOUNTER — LAB (OUTPATIENT)
Dept: LAB | Facility: HOSPITAL | Age: 74
End: 2019-02-15

## 2019-02-15 DIAGNOSIS — Z51.81 ENCOUNTER FOR THERAPEUTIC DRUG LEVEL MONITORING: ICD-10-CM

## 2019-02-15 DIAGNOSIS — M79.642 PAIN IN LEFT HAND: Primary | ICD-10-CM

## 2019-02-15 DIAGNOSIS — M79.642 PAIN IN LEFT HAND: ICD-10-CM

## 2019-02-15 DIAGNOSIS — Z79.899 ENCOUNTER FOR LONG-TERM (CURRENT) USE OF MEDICATIONS: ICD-10-CM

## 2019-02-15 DIAGNOSIS — M79.644 PAIN IN FINGER OF RIGHT HAND: ICD-10-CM

## 2019-02-15 LAB
ALBUMIN SERPL-MCNC: 4.2 G/DL (ref 3.5–5)
ALBUMIN/GLOB SERPL: 1.3 G/DL (ref 1.1–2.5)
ALP SERPL-CCNC: 89 U/L (ref 24–120)
ALT SERPL W P-5'-P-CCNC: 23 U/L (ref 0–54)
ANION GAP SERPL CALCULATED.3IONS-SCNC: 9 MMOL/L (ref 4–13)
AST SERPL-CCNC: 17 U/L (ref 7–45)
AUTO MIXED CELLS #: 0.6 10*3/MM3 (ref 0.1–2.6)
AUTO MIXED CELLS %: 10.6 % (ref 0.1–24)
BILIRUB SERPL-MCNC: 0.5 MG/DL (ref 0.1–1)
BUN BLD-MCNC: 16 MG/DL (ref 5–21)
BUN/CREAT SERPL: 23.2
CALCIUM SPEC-SCNC: 9.6 MG/DL (ref 8.4–10.4)
CHLORIDE SERPL-SCNC: 104 MMOL/L (ref 98–110)
CHOLEST SERPL-MCNC: 203 MG/DL (ref 130–200)
CO2 SERPL-SCNC: 29 MMOL/L (ref 24–31)
CREAT BLD-MCNC: 0.69 MG/DL (ref 0.5–1.4)
ERYTHROCYTE [DISTWIDTH] IN BLOOD BY AUTOMATED COUNT: 13.2 % (ref 12–15)
GFR SERPL CREATININE-BSD FRML MDRD: 83 ML/MIN/1.73
GLOBULIN UR ELPH-MCNC: 3.2 GM/DL
GLUCOSE BLD-MCNC: 101 MG/DL (ref 70–100)
HCT VFR BLD AUTO: 41.3 % (ref 37–47)
HDLC SERPL-MCNC: 55 MG/DL
HGB BLD-MCNC: 14.2 G/DL (ref 12–16)
LDLC SERPL CALC-MCNC: 130 MG/DL (ref 0–99)
LDLC/HDLC SERPL: 2.36 {RATIO}
LYMPHOCYTES # BLD AUTO: 2.1 10*3/MM3 (ref 0.8–7)
LYMPHOCYTES NFR BLD AUTO: 38.8 % (ref 15–45)
MCH RBC QN AUTO: 29.6 PG (ref 28–32)
MCHC RBC AUTO-ENTMCNC: 34.4 G/DL (ref 33–36)
MCV RBC AUTO: 86 FL (ref 82–98)
NEUTROPHILS # BLD AUTO: 2.8 10*3/MM3 (ref 1.5–8.3)
NEUTROPHILS NFR BLD AUTO: 50.6 % (ref 39–78)
PLATELET # BLD AUTO: 224 10*3/MM3 (ref 130–400)
PMV BLD AUTO: 10.2 FL (ref 6–12)
POTASSIUM BLD-SCNC: 4.5 MMOL/L (ref 3.5–5.3)
PROT SERPL-MCNC: 7.4 G/DL (ref 6.3–8.7)
RBC # BLD AUTO: 4.8 10*6/MM3 (ref 4.2–5.4)
SODIUM BLD-SCNC: 142 MMOL/L (ref 135–145)
TRIGL SERPL-MCNC: 91 MG/DL (ref 0–149)
VLDLC SERPL-MCNC: 18.2 MG/DL
WBC NRBC COR # BLD: 5.5 10*3/MM3 (ref 4.8–10.8)

## 2019-02-15 PROCEDURE — 80061 LIPID PANEL: CPT

## 2019-02-15 PROCEDURE — 73140 X-RAY EXAM OF FINGER(S): CPT

## 2019-02-15 PROCEDURE — 80053 COMPREHEN METABOLIC PANEL: CPT

## 2019-02-15 PROCEDURE — 85025 COMPLETE CBC W/AUTO DIFF WBC: CPT

## 2019-02-15 PROCEDURE — 36415 COLL VENOUS BLD VENIPUNCTURE: CPT

## 2019-05-10 ENCOUNTER — OFFICE VISIT (OUTPATIENT)
Dept: INTERNAL MEDICINE | Age: 74
End: 2019-05-10
Payer: MEDICARE

## 2019-05-10 ENCOUNTER — TELEPHONE (OUTPATIENT)
Dept: NEUROLOGY | Age: 74
End: 2019-05-10

## 2019-05-10 VITALS
WEIGHT: 145 LBS | DIASTOLIC BLOOD PRESSURE: 70 MMHG | OXYGEN SATURATION: 98 % | BODY MASS INDEX: 24.75 KG/M2 | RESPIRATION RATE: 20 BRPM | HEIGHT: 64 IN | SYSTOLIC BLOOD PRESSURE: 120 MMHG | HEART RATE: 69 BPM

## 2019-05-10 DIAGNOSIS — R73.01 IMPAIRED FASTING GLUCOSE: ICD-10-CM

## 2019-05-10 DIAGNOSIS — E55.9 VITAMIN D DEFICIENCY: Primary | ICD-10-CM

## 2019-05-10 DIAGNOSIS — E53.8 B12 DEFICIENCY: ICD-10-CM

## 2019-05-10 DIAGNOSIS — F41.1 GENERALIZED ANXIETY DISORDER: ICD-10-CM

## 2019-05-10 DIAGNOSIS — Z00.00 MEDICARE ANNUAL WELLNESS VISIT, SUBSEQUENT: ICD-10-CM

## 2019-05-10 DIAGNOSIS — E05.90 HYPERTHYROIDISM: ICD-10-CM

## 2019-05-10 DIAGNOSIS — E78.00 PURE HYPERCHOLESTEROLEMIA: ICD-10-CM

## 2019-05-10 DIAGNOSIS — R41.3 MEMORY CHANGES: ICD-10-CM

## 2019-05-10 DIAGNOSIS — E55.9 VITAMIN D DEFICIENCY: ICD-10-CM

## 2019-05-10 LAB
ALBUMIN SERPL-MCNC: 4.7 G/DL (ref 3.5–5.2)
ALP BLD-CCNC: 117 U/L (ref 35–104)
ALT SERPL-CCNC: 13 U/L (ref 5–33)
ANION GAP SERPL CALCULATED.3IONS-SCNC: 17 MMOL/L (ref 7–19)
AST SERPL-CCNC: 12 U/L (ref 5–32)
BILIRUB SERPL-MCNC: 0.4 MG/DL (ref 0.2–1.2)
BUN BLDV-MCNC: 13 MG/DL (ref 8–23)
CALCIUM SERPL-MCNC: 10.3 MG/DL (ref 8.8–10.2)
CHLORIDE BLD-SCNC: 103 MMOL/L (ref 98–111)
CO2: 24 MMOL/L (ref 22–29)
CREAT SERPL-MCNC: 0.7 MG/DL (ref 0.5–0.9)
GFR NON-AFRICAN AMERICAN: >60
GLUCOSE BLD-MCNC: 111 MG/DL (ref 74–109)
HBA1C MFR BLD: 5.7 % (ref 4–6)
POTASSIUM SERPL-SCNC: 4.9 MMOL/L (ref 3.5–5)
SODIUM BLD-SCNC: 144 MMOL/L (ref 136–145)
TOTAL PROTEIN: 7.5 G/DL (ref 6.6–8.7)
TSH SERPL DL<=0.05 MIU/L-ACNC: 3.03 UIU/ML (ref 0.27–4.2)
VITAMIN B-12: 254 PG/ML (ref 211–946)
VITAMIN D 25-HYDROXY: 54.2 NG/ML

## 2019-05-10 PROCEDURE — 3017F COLORECTAL CA SCREEN DOC REV: CPT | Performed by: INTERNAL MEDICINE

## 2019-05-10 PROCEDURE — 1123F ACP DISCUSS/DSCN MKR DOCD: CPT | Performed by: INTERNAL MEDICINE

## 2019-05-10 PROCEDURE — G8598 ASA/ANTIPLAT THER USED: HCPCS | Performed by: INTERNAL MEDICINE

## 2019-05-10 PROCEDURE — 1036F TOBACCO NON-USER: CPT | Performed by: INTERNAL MEDICINE

## 2019-05-10 PROCEDURE — G8427 DOCREV CUR MEDS BY ELIG CLIN: HCPCS | Performed by: INTERNAL MEDICINE

## 2019-05-10 PROCEDURE — G8400 PT W/DXA NO RESULTS DOC: HCPCS | Performed by: INTERNAL MEDICINE

## 2019-05-10 PROCEDURE — G8420 CALC BMI NORM PARAMETERS: HCPCS | Performed by: INTERNAL MEDICINE

## 2019-05-10 PROCEDURE — 4040F PNEUMOC VAC/ADMIN/RCVD: CPT | Performed by: INTERNAL MEDICINE

## 2019-05-10 PROCEDURE — 1090F PRES/ABSN URINE INCON ASSESS: CPT | Performed by: INTERNAL MEDICINE

## 2019-05-10 PROCEDURE — 99214 OFFICE O/P EST MOD 30 MIN: CPT | Performed by: INTERNAL MEDICINE

## 2019-05-10 ASSESSMENT — ENCOUNTER SYMPTOMS
CONSTIPATION: 0
SORE THROAT: 0
ABDOMINAL PAIN: 0
CHEST TIGHTNESS: 0
WHEEZING: 0
COUGH: 0

## 2019-05-10 NOTE — PROGRESS NOTES
Chief Complaint   Patient presents with    Hyperlipidemia    Speech Problem     History of presenting illness:  Kraig Buckley is a73 y.o. female who presents today for follow up on her chronic medical conditions as noted below.     Pure hypercholesterolemia-she has tried to stay on diet, started lipitor 20 daily 3 mo ago     Impaired fasting glucose-has tried to stay on diet and avoid concentrated sweets     Vitamin D deficiency-she takes vitamin D supplement 50,000 IU weekly     Persistent insomnia-she takes Sonata at bedtime to help her sleep occasionalyy / does not need refill today     Generalized anxiety disorder- she has occasional panic attacks, takes occasional Ativan tablet when necessary only    Feels her memory getting worse  Forgets  At times has difficulty saying out words/ finding words  Sx at least one year     Patient Active Problem List    Diagnosis Date Noted    Unspecified hearing loss, bilateral 06/04/2018    Tinnitus aurium, bilateral 06/04/2018    Vertigo, benign paroxysmal, right 06/04/2018    Benign paroxysmal positional vertigo due to bilateral vestibular disorder 05/15/2018    Allergic rhinitis 08/10/2017    Attention deficit disorder (ADD) without hyperactivity 08/10/2017    Auditory hallucinations 08/10/2017    Carotid atherosclerosis 08/10/2017    Cervicalgia 08/10/2017    Degeneration of cervical intervertebral disc 08/10/2017    Disc degeneration, lumbosacral 08/10/2017    Dizziness 08/10/2017    Dysuria 08/10/2017    Hypercalcemia 08/10/2017    Hyperthyroidism 08/10/2017    Lactose intolerance 08/10/2017    Menopausal symptoms 08/10/2017    Microhematuria 08/10/2017    Palpitations 08/10/2017    Panic disorder without agoraphobia 08/10/2017    Paroxysmal hypertension 08/10/2017    Peripheral neuropathy 08/10/2017    Urine incontinence 08/10/2017    Pure hypercholesterolemia 08/05/2017    Generalized anxiety disorder 08/05/2017    Vitamin D deficiency 08/05/2017    Persistent insomnia 08/05/2017    Impaired fasting glucose 08/05/2017    Carotid artery stenosis 08/05/2017     Overview Note:     Rt carotid 50%; repeat 2014 internal ok/ external  At 50%      Osteopenia of both lower legs 08/05/2017     Overview Note:     12/15  Hip neck -1.9/ other nl      Fatigue 08/05/2017    Chronic vertigo 08/05/2017     Overview Note:     All eval in past has been negative      Nontoxic multinodular goiter 08/05/2017    OA (osteoarthritis) of knee 08/05/2017     Past Medical History:   Diagnosis Date    ADD (attention deficit disorder)     Carotid atherosclerosis     Degeneration of cervical intervertebral disc     Mixed hyperlipidemia     Nontoxic multinodular goiter     OA (osteoarthritis)     Osteopenia     Panic disorder without agoraphobia     Peripheral neuropathy     Pure hypercholesterolemia     Urinary incontinence     Vitamin D deficiency       Past Surgical History:   Procedure Laterality Date    COLONOSCOPY      HYSTERECTOMY, TOTAL ABDOMINAL      w/removal of both ovaries    JOINT REPLACEMENT      knee    TOTAL KNEE ARTHROPLASTY Right 2011    TOTAL KNEE ARTHROPLASTY Left 2012    VAGINA SURGERY      Abdomino-Vaginal Vesical Neck Suspension     Current Outpatient Medications   Medication Sig Dispense Refill    meclizine (ANTIVERT) 25 MG tablet TAKE 1 TABLET BY MOUTH THREE TIMES DAILY AS NEEDED FOR DIZZINESS 90 tablet 0    vitamin D (ERGOCALCIFEROL) 83685 units CAPS capsule Take 1 capsule by mouth once a week 12 capsule 3    tretinoin (RETIN-A) 0.1 % cream Apply topically nightly.  20 g 1    Ascorbic Acid (VITAMIN C) 500 MG tablet Take 500 mg by mouth daily      aspirin 81 MG tablet Take 81 mg by mouth daily      atorvastatin (LIPITOR) 20 MG tablet Take 1 tablet by mouth daily 90 tablet 1    cetirizine (ZYRTEC) 10 MG tablet Take 10 mg by mouth daily      Coenzyme Q10 (CO Q-10) 200 MG CAPS Take 1 capsule by mouth daily       Chromium Picolinate 800 MCG TABS Take 1 tablet by mouth daily      Misc Natural Products (GINKOGIN PO) Take 50 mg by mouth daily        No current facility-administered medications for this visit. Allergies   Allergen Reactions    Latex     Alcohol      liquid  liquid    Astelin [Azelastine Hcl]      solution  solution    Ciprofloxacin Hcl Swelling    Fluticasone Propionate      suspension  suspension    Hydroquinone      cream    Livalo [Pitavastatin]      tablets    Other      seafood  seafood    Vitamin B Complex [B-100]      Vit B 3 tablets  Vit B 3 tablets     Social History     Tobacco Use    Smoking status: Former Smoker     Packs/day: 2.50     Years: 5.00     Pack years: 12.50     Last attempt to quit:      Years since quittin.3    Smokeless tobacco: Never Used   Substance Use Topics    Alcohol use: Not on file      Family History   Problem Relation Age of Onset    Cancer Mother         Hodgkins Lymphoma, AML    Breast Cancer Mother     Heart Disease Other        Review of Systems   Constitutional: Positive for fatigue. Negative for chills and fever. HENT: Negative for congestion, ear pain, nosebleeds, postnasal drip and sore throat. Respiratory: Negative for cough, chest tightness and wheezing. Cardiovascular: Negative for chest pain, palpitations and leg swelling. Gastrointestinal: Negative for abdominal pain and constipation. Genitourinary: Negative for dysuria and urgency. Musculoskeletal: Positive for arthralgias. Skin: Negative for rash. Neurological: Negative for dizziness and headaches. Psychiatric/Behavioral: Negative. Vitals:    05/10/19 1009   BP: 120/70   Pulse: 69   Resp: 20   SpO2: 98%   Weight: 145 lb (65.8 kg)   Height: 5' 4\" (1.626 m)     Body mass index is 24.89 kg/m². Physical Exam   Constitutional: She is oriented to person, place, and time. She appears well-developed and well-nourished.    HENT:   Right Ear: External ear normal.   Left Ear: External ear normal.   Mouth/Throat: Oropharynx is clear and moist. No oropharyngeal exudate. Eyes: Pupils are equal, round, and reactive to light. Conjunctivae are normal.   Neck: Neck supple. No JVD present. No thyromegaly present. Cardiovascular: Normal rate and normal heart sounds. No murmur heard. Pulmonary/Chest: Breath sounds normal. No respiratory distress. She has no wheezes. She has no rales. She exhibits no tenderness. Abdominal: Soft. Bowel sounds are normal.   Musculoskeletal: Normal range of motion. Lymphadenopathy:     She has no cervical adenopathy. Neurological: She is oriented to person, place, and time. Skin: Skin is warm. No rash noted. Lab Review   No visits with results within 2 Month(s) from this visit.    Latest known visit with results is:   Orders Only on 01/04/2019   Component Date Value    Hemoglobin A1C 01/04/2019 5.6     Sodium 01/04/2019 145     Potassium 01/04/2019 4.7     Chloride 01/04/2019 105     CO2 01/04/2019 25     Anion Gap 01/04/2019 15     Glucose 01/04/2019 117*    BUN 01/04/2019 13     CREATININE 01/04/2019 0.7     GFR Non- 01/04/2019 >60     Calcium 01/04/2019 10.0     Total Protein 01/04/2019 6.9     Alb 01/04/2019 4.3     Total Bilirubin 01/04/2019 0.5     Alkaline Phosphatase 01/04/2019 105*    ALT 01/04/2019 15     AST 01/04/2019 14     Cholesterol, Total 01/04/2019 162     Triglycerides 01/04/2019 80     HDL 01/04/2019 51*    LDL Calculated 01/04/2019 95     Vit D, 25-Hydroxy 01/04/2019 46.6     WBC 01/04/2019 7.0     RBC 01/04/2019 5.04     Hemoglobin 01/04/2019 14.2     Hematocrit 01/04/2019 45.1     MCV 01/04/2019 89.5     MCH 01/04/2019 28.2     MCHC 01/04/2019 31.5*    RDW 01/04/2019 13.1     Platelets 56/24/7625 227     MPV 01/04/2019 10.9     Neutrophils % 01/04/2019 55.8     Lymphocytes % 01/04/2019 34.1     Monocytes % 01/04/2019 7.3     Eosinophils % 01/04/2019 1.6     Basophils %

## 2019-05-29 ENCOUNTER — OFFICE VISIT (OUTPATIENT)
Dept: INTERNAL MEDICINE | Age: 74
End: 2019-05-29
Payer: MEDICARE

## 2019-05-29 VITALS
SYSTOLIC BLOOD PRESSURE: 112 MMHG | HEART RATE: 69 BPM | BODY MASS INDEX: 24.75 KG/M2 | WEIGHT: 145 LBS | HEIGHT: 64 IN | DIASTOLIC BLOOD PRESSURE: 70 MMHG | OXYGEN SATURATION: 93 % | RESPIRATION RATE: 18 BRPM

## 2019-05-29 DIAGNOSIS — L29.9 ITCHING: Primary | ICD-10-CM

## 2019-05-29 PROCEDURE — 4040F PNEUMOC VAC/ADMIN/RCVD: CPT | Performed by: PHYSICIAN ASSISTANT

## 2019-05-29 PROCEDURE — 1090F PRES/ABSN URINE INCON ASSESS: CPT | Performed by: PHYSICIAN ASSISTANT

## 2019-05-29 PROCEDURE — 1036F TOBACCO NON-USER: CPT | Performed by: PHYSICIAN ASSISTANT

## 2019-05-29 PROCEDURE — G8598 ASA/ANTIPLAT THER USED: HCPCS | Performed by: PHYSICIAN ASSISTANT

## 2019-05-29 PROCEDURE — G8427 DOCREV CUR MEDS BY ELIG CLIN: HCPCS | Performed by: PHYSICIAN ASSISTANT

## 2019-05-29 PROCEDURE — 3017F COLORECTAL CA SCREEN DOC REV: CPT | Performed by: PHYSICIAN ASSISTANT

## 2019-05-29 PROCEDURE — G8420 CALC BMI NORM PARAMETERS: HCPCS | Performed by: PHYSICIAN ASSISTANT

## 2019-05-29 PROCEDURE — 99213 OFFICE O/P EST LOW 20 MIN: CPT | Performed by: PHYSICIAN ASSISTANT

## 2019-05-29 PROCEDURE — 1123F ACP DISCUSS/DSCN MKR DOCD: CPT | Performed by: PHYSICIAN ASSISTANT

## 2019-05-29 PROCEDURE — G8400 PT W/DXA NO RESULTS DOC: HCPCS | Performed by: PHYSICIAN ASSISTANT

## 2019-05-29 ASSESSMENT — ENCOUNTER SYMPTOMS
CHEST TIGHTNESS: 0
SORE THROAT: 0
WHEEZING: 0
ABDOMINAL PAIN: 0
RHINORRHEA: 0
EYE PAIN: 0
SINUS PRESSURE: 0
CONSTIPATION: 0
COLOR CHANGE: 0
DIARRHEA: 0
NAUSEA: 0
PHOTOPHOBIA: 0
SHORTNESS OF BREATH: 0
COUGH: 0
VOMITING: 0
EYE REDNESS: 0

## 2019-05-29 NOTE — PROGRESS NOTES
[facility]  [address] [address]  Dept: [phone]  Dept Fax: [phone]: [phone]      [facility]  OFFICE NOTE      Chief Complaint   Patient presents with    Pruritis     Patient states she woke up this am itching all over. Patient denies rash. Dayton Fall is a 75y.o. year old female who is seen for evaluation of itching. Patient states that she awoke from sleep this morning with itching that started at her head and move down to her neck it happened about 9:00 this morning. Patient states she felt itching for about 2-3 minutes and then it went away. Patient denies any itching currently patient denies any rash. Patient denies any new foods or new medications or new hygiene products. Patient denies any chest pain or shortness of breath or swelling. Patient states has never had an episode like this before. Patient states she feels fine now but just was concerned of what could've been.     Active Ambulatory Problems     Diagnosis Date Noted    Pure hypercholesterolemia 08/05/2017    Generalized anxiety disorder 08/05/2017    Vitamin D deficiency 08/05/2017    Persistent insomnia 08/05/2017    Impaired fasting glucose 08/05/2017    Carotid artery stenosis 08/05/2017    Osteopenia of both lower legs 08/05/2017    Fatigue 08/05/2017    Chronic vertigo 08/05/2017    Nontoxic multinodular goiter 08/05/2017    OA (osteoarthritis) of knee 08/05/2017    Allergic rhinitis 08/10/2017    Attention deficit disorder (ADD) without hyperactivity 08/10/2017    Auditory hallucinations 08/10/2017    Carotid atherosclerosis 08/10/2017    Cervicalgia 08/10/2017    Degeneration of cervical intervertebral disc 08/10/2017    Disc degeneration, lumbosacral 08/10/2017    Dizziness 08/10/2017    Dysuria 08/10/2017    Hypercalcemia 08/10/2017    Hyperthyroidism 08/10/2017    Lactose intolerance 08/10/2017    Menopausal symptoms 08/10/2017    Microhematuria 08/10/2017    Palpitations 08/10/2017    Panic disorder without agoraphobia 08/10/2017    Paroxysmal hypertension 08/10/2017    Peripheral neuropathy 08/10/2017    Urine incontinence 08/10/2017    Benign paroxysmal positional vertigo due to bilateral vestibular disorder 05/15/2018    Unspecified hearing loss, bilateral 06/04/2018    Tinnitus aurium, bilateral 06/04/2018    Vertigo, benign paroxysmal, right 06/04/2018     Resolved Ambulatory Problems     Diagnosis Date Noted    Special screening for malignant neoplasms, colon 08/05/2017    Periorbital cellulitis of right eye 08/10/2017    Medicare annual wellness visit, subsequent 10/31/2017     Past Medical History:   Diagnosis Date    ADD (attention deficit disorder)     Carotid atherosclerosis     Degeneration of cervical intervertebral disc     Mixed hyperlipidemia     Nontoxic multinodular goiter     OA (osteoarthritis)     Osteopenia     Panic disorder without agoraphobia     Peripheral neuropathy     Pure hypercholesterolemia     Urinary incontinence     Vitamin D deficiency        Past Medical History:   Diagnosis Date    ADD (attention deficit disorder)     Carotid atherosclerosis     Degeneration of cervical intervertebral disc     Mixed hyperlipidemia     Nontoxic multinodular goiter     OA (osteoarthritis)     Osteopenia     Panic disorder without agoraphobia     Peripheral neuropathy     Pure hypercholesterolemia     Urinary incontinence     Vitamin D deficiency        Prior to Visit Medications    Medication Sig Taking?  Authorizing Provider   meclizine (ANTIVERT) 25 MG tablet TAKE 1 TABLET BY MOUTH THREE TIMES DAILY AS NEEDED FOR DIZZINESS Yes Richard Brady MD   atorvastatin (LIPITOR) 20 MG tablet Take 1 tablet by mouth daily Yes Richard Brady MD   vitamin D (ERGOCALCIFEROL) 06280 units CAPS capsule Take 1 capsule by mouth once a week Yes Richard Brady MD Tobacco Use    Smoking status: Former Smoker     Packs/day: 2.50     Years: 5.00     Pack years: 12.50     Last attempt to quit:      Years since quittin.4    Smokeless tobacco: Never Used   Substance and Sexual Activity    Alcohol use: Not on file    Drug use: Not on file    Sexual activity: Not on file   Lifestyle    Physical activity:     Days per week: Not on file     Minutes per session: Not on file    Stress: Not on file   Relationships    Social connections:     Talks on phone: Not on file     Gets together: Not on file     Attends Orthodoxy service: Not on file     Active member of club or organization: Not on file     Attends meetings of clubs or organizations: Not on file     Relationship status: Not on file    Intimate partner violence:     Fear of current or ex partner: Not on file     Emotionally abused: Not on file     Physically abused: Not on file     Forced sexual activity: Not on file   Other Topics Concern    Not on file   Social History Narrative    Not on file       Review of Systems  Review of Systems   Constitutional: Negative for activity change, appetite change, chills, fatigue and fever. HENT: Negative for congestion, ear pain, postnasal drip, rhinorrhea, sinus pressure, sneezing and sore throat. Eyes: Negative for photophobia, pain and redness. Respiratory: Negative for cough, chest tightness, shortness of breath and wheezing. Cardiovascular: Negative for chest pain, palpitations and leg swelling. Gastrointestinal: Negative for abdominal pain, constipation, diarrhea, nausea and vomiting. Genitourinary: Negative for dysuria, frequency, hematuria and urgency. Musculoskeletal: Negative for arthralgias, gait problem, joint swelling and myalgias. Skin: Negative for color change, pallor and wound. Neurological: Negative for dizziness, facial asymmetry, speech difficulty, weakness and light-headedness. Hematological: Negative for adenopathy.  Does not bruise/bleed easily. Psychiatric/Behavioral: Negative for confusion. The patient is not nervous/anxious. Current Outpatient Medications   Medication Sig Dispense Refill    meclizine (ANTIVERT) 25 MG tablet TAKE 1 TABLET BY MOUTH THREE TIMES DAILY AS NEEDED FOR DIZZINESS 90 tablet 0    atorvastatin (LIPITOR) 20 MG tablet Take 1 tablet by mouth daily 90 tablet 1    vitamin D (ERGOCALCIFEROL) 74627 units CAPS capsule Take 1 capsule by mouth once a week 12 capsule 3    tretinoin (RETIN-A) 0.1 % cream Apply topically nightly. 20 g 1    cetirizine (ZYRTEC) 10 MG tablet Take 10 mg by mouth daily      Ascorbic Acid (VITAMIN C) 500 MG tablet Take 500 mg by mouth daily      Coenzyme Q10 (CO Q-10) 200 MG CAPS Take 1 capsule by mouth daily       Chromium Picolinate 800 MCG TABS Take 1 tablet by mouth daily      Misc Natural Products (GINKOGIN PO) Take 50 mg by mouth daily       aspirin 81 MG tablet Take 81 mg by mouth daily       No current facility-administered medications for this visit. /70   Pulse 69   Resp 18   Ht 5' 4\" (1.626 m)   Wt 145 lb (65.8 kg)   SpO2 93%   BMI 24.89 kg/m²     PHYSICAL EXAM  Physical Exam   Constitutional: Vital signs are normal. She appears well-developed and well-nourished. HENT:   Head: Normocephalic and atraumatic. Right Ear: External ear normal.   Left Ear: External ear normal.   Nose: Nose normal.   Eyes: Pupils are equal, round, and reactive to light. Right eye exhibits no discharge. Left eye exhibits no discharge. Neck: Normal range of motion. No tracheal deviation present. Cardiovascular: Normal rate, regular rhythm and normal heart sounds. Exam reveals no gallop and no friction rub. No murmur heard. Pulmonary/Chest: Effort normal and breath sounds normal. No respiratory distress. She has no wheezes. She has no rales. She exhibits no tenderness. Abdominal: Soft. There is no tenderness. There is no rebound and no guarding. Musculoskeletal: Normal range of motion. She exhibits no tenderness or deformity. Lymphadenopathy:     She has no cervical adenopathy. Neurological: She is alert. She has normal reflexes. Skin: Skin is warm, dry and intact. No lesion and no rash noted. No erythema. Psychiatric: She has a normal mood and affect. Her mood appears not anxious. She does not exhibit a depressed mood. Nursing note and vitals reviewed. ASSESSMENT      ICD-10-CM    1. Itching L29.9        PLAN  Discussed with patient I'm not sure what caused the episode of itching in her head and neck that lasted 2-3 minutes this morning when she awoke. Patient has no rash. Patient denies any sensation of itching nail. Patient denies any new foods or hygiene products or new medications. Patient states nothing is changed. Patient denies any chest pain or shortness of breath. Discussed with patient just to monitor if it happens again we could do some further testing . Patient follow-up as needed. Return if symptoms worsen or fail to improve. All questions answered. Patient voices understanding and agrees to plans along with risks and benefits of plan. Patient is instructed to continue prior medications, diet, and exercise plans as instructed. Patient agrees to follow up as instructions, sooner if needed, or to go to ER if condition becomes emergent. Additional Instructions: As always, patient is advised to bring in medication bottles in order to correctly reconcile with our current list.      Kermit Maradiaga PA-C    EMR Dragon/transcription disclaimer: Much of this encounter note is electronic transcription/translation of spoken language to printed text. The electronictranslation of spoken language may be erroneous, or at times, nonsensical words or phrases may be inadvertently transcribed.  Although I have reviewed the note for such errors, some may still exist.

## 2019-06-04 NOTE — PROGRESS NOTES
Agree with assessment and plan of care  Electronically signed by Alivia Lin MD on 6/4/2019 at 7:28 AM

## 2019-06-08 ENCOUNTER — HOSPITAL ENCOUNTER (EMERGENCY)
Facility: HOSPITAL | Age: 74
Discharge: HOME | End: 2019-06-08
Attending: EMERGENCY MEDICINE | Admitting: EMERGENCY MEDICINE

## 2019-06-08 VITALS
DIASTOLIC BLOOD PRESSURE: 77 MMHG | RESPIRATION RATE: 17 BRPM | TEMPERATURE: 98.7 F | HEART RATE: 65 BPM | WEIGHT: 147 LBS | BODY MASS INDEX: 25.1 KG/M2 | SYSTOLIC BLOOD PRESSURE: 149 MMHG | OXYGEN SATURATION: 96 % | HEIGHT: 64 IN

## 2019-06-08 DIAGNOSIS — T78.40XA ALLERGIC REACTION, INITIAL ENCOUNTER: Primary | ICD-10-CM

## 2019-06-08 PROCEDURE — 99282 EMERGENCY DEPT VISIT SF MDM: CPT

## 2019-06-08 RX ORDER — FAMOTIDINE 20 MG/1
20 TABLET, FILM COATED ORAL 2 TIMES DAILY
Qty: 10 TABLET | Refills: 0 | Status: SHIPPED | OUTPATIENT
Start: 2019-06-08 | End: 2019-11-08

## 2019-06-08 RX ORDER — CETIRIZINE HYDROCHLORIDE 10 MG/1
10 TABLET ORAL DAILY
Qty: 10 TABLET | Refills: 0 | Status: SHIPPED | OUTPATIENT
Start: 2019-06-08

## 2019-06-08 RX ORDER — METHYLPREDNISOLONE 4 MG/1
TABLET ORAL
Qty: 21 TABLET | Refills: 0 | Status: SHIPPED | OUTPATIENT
Start: 2019-06-08 | End: 2019-11-08

## 2019-09-05 ENCOUNTER — APPOINTMENT (OUTPATIENT)
Dept: MRI IMAGING | Facility: HOSPITAL | Age: 74
End: 2019-09-05

## 2019-09-05 ENCOUNTER — HOSPITAL ENCOUNTER (EMERGENCY)
Facility: HOSPITAL | Age: 74
Discharge: HOME | End: 2019-09-05
Attending: EMERGENCY MEDICINE | Admitting: EMERGENCY MEDICINE

## 2019-09-05 ENCOUNTER — APPOINTMENT (OUTPATIENT)
Dept: CT IMAGING | Facility: HOSPITAL | Age: 74
End: 2019-09-05

## 2019-09-05 VITALS
SYSTOLIC BLOOD PRESSURE: 163 MMHG | OXYGEN SATURATION: 96 % | WEIGHT: 148 LBS | HEIGHT: 66 IN | BODY MASS INDEX: 23.78 KG/M2 | RESPIRATION RATE: 16 BRPM | TEMPERATURE: 98 F | HEART RATE: 88 BPM | DIASTOLIC BLOOD PRESSURE: 87 MMHG

## 2019-09-05 DIAGNOSIS — R41.0 CONFUSION: Primary | ICD-10-CM

## 2019-09-05 LAB
ALBUMIN SERPL-MCNC: 4.3 G/DL (ref 3.5–5)
ALBUMIN/GLOB SERPL: 1.7 G/DL (ref 1.1–2.5)
ALP SERPL-CCNC: 80 U/L (ref 24–120)
ALT SERPL W P-5'-P-CCNC: 22 U/L (ref 0–54)
AMPHET+METHAMPHET UR QL: NEGATIVE
ANION GAP SERPL CALCULATED.3IONS-SCNC: 7 MMOL/L (ref 4–13)
AST SERPL-CCNC: 31 U/L (ref 7–45)
BACTERIA UR QL AUTO: ABNORMAL /HPF
BARBITURATES UR QL SCN: NEGATIVE
BASOPHILS # BLD AUTO: 0.06 10*3/MM3 (ref 0–0.2)
BASOPHILS NFR BLD AUTO: 1 % (ref 0–1.5)
BENZODIAZ UR QL SCN: NEGATIVE
BILIRUB SERPL-MCNC: 0.7 MG/DL (ref 0.1–1)
BILIRUB UR QL STRIP: NEGATIVE
BUN BLD-MCNC: 10 MG/DL (ref 5–21)
BUN/CREAT SERPL: 17.9 (ref 7–25)
CALCIUM SPEC-SCNC: 9.7 MG/DL (ref 8.4–10.4)
CANNABINOIDS SERPL QL: NEGATIVE
CHLORIDE SERPL-SCNC: 108 MMOL/L (ref 98–110)
CLARITY UR: CLEAR
CO2 SERPL-SCNC: 25 MMOL/L (ref 24–31)
COCAINE UR QL: NEGATIVE
COLOR UR: YELLOW
CREAT BLD-MCNC: 0.56 MG/DL (ref 0.5–1.4)
DEPRECATED RDW RBC AUTO: 40.6 FL (ref 37–54)
EOSINOPHIL # BLD AUTO: 0.05 10*3/MM3 (ref 0–0.4)
EOSINOPHIL NFR BLD AUTO: 0.9 % (ref 0.3–6.2)
ERYTHROCYTE [DISTWIDTH] IN BLOOD BY AUTOMATED COUNT: 13.1 % (ref 12.3–15.4)
GFR SERPL CREATININE-BSD FRML MDRD: 106 ML/MIN/1.73
GLOBULIN UR ELPH-MCNC: 2.6 GM/DL
GLUCOSE BLD-MCNC: 115 MG/DL (ref 70–100)
GLUCOSE UR STRIP-MCNC: NEGATIVE MG/DL
HCT VFR BLD AUTO: 39.6 % (ref 34–46.6)
HGB BLD-MCNC: 13.7 G/DL (ref 12–15.9)
HGB UR QL STRIP.AUTO: ABNORMAL
HYALINE CASTS UR QL AUTO: ABNORMAL /LPF
IMM GRANULOCYTES # BLD AUTO: 0.01 10*3/MM3 (ref 0–0.05)
IMM GRANULOCYTES NFR BLD AUTO: 0.2 % (ref 0–0.5)
KETONES UR QL STRIP: NEGATIVE
LEUKOCYTE ESTERASE UR QL STRIP.AUTO: NEGATIVE
LYMPHOCYTES # BLD AUTO: 1.49 10*3/MM3 (ref 0.7–3.1)
LYMPHOCYTES NFR BLD AUTO: 25.6 % (ref 19.6–45.3)
MCH RBC QN AUTO: 29.7 PG (ref 26.6–33)
MCHC RBC AUTO-ENTMCNC: 34.6 G/DL (ref 31.5–35.7)
MCV RBC AUTO: 85.7 FL (ref 79–97)
METHADONE UR QL SCN: NEGATIVE
MONOCYTES # BLD AUTO: 0.4 10*3/MM3 (ref 0.1–0.9)
MONOCYTES NFR BLD AUTO: 6.9 % (ref 5–12)
NEUTROPHILS # BLD AUTO: 3.81 10*3/MM3 (ref 1.7–7)
NEUTROPHILS NFR BLD AUTO: 65.4 % (ref 42.7–76)
NITRITE UR QL STRIP: NEGATIVE
NRBC BLD AUTO-RTO: 0 /100 WBC (ref 0–0.2)
OPIATES UR QL: NEGATIVE
PCP UR QL SCN: NEGATIVE
PH UR STRIP.AUTO: 7 [PH] (ref 5–8)
PLATELET # BLD AUTO: 204 10*3/MM3 (ref 140–450)
PMV BLD AUTO: 11.2 FL (ref 6–12)
POTASSIUM BLD-SCNC: 4 MMOL/L (ref 3.5–5.3)
PROT SERPL-MCNC: 6.9 G/DL (ref 6.3–8.7)
PROT UR QL STRIP: NEGATIVE
RBC # BLD AUTO: 4.62 10*6/MM3 (ref 3.77–5.28)
RBC # UR: ABNORMAL /HPF
REF LAB TEST METHOD: ABNORMAL
SODIUM BLD-SCNC: 140 MMOL/L (ref 135–145)
SP GR UR STRIP: 1.01 (ref 1–1.03)
SQUAMOUS #/AREA URNS HPF: ABNORMAL /HPF
UROBILINOGEN UR QL STRIP: ABNORMAL
WBC NRBC COR # BLD: 5.82 10*3/MM3 (ref 3.4–10.8)
WBC UR QL AUTO: ABNORMAL /HPF

## 2019-09-05 PROCEDURE — 80307 DRUG TEST PRSMV CHEM ANLYZR: CPT | Performed by: EMERGENCY MEDICINE

## 2019-09-05 PROCEDURE — 70551 MRI BRAIN STEM W/O DYE: CPT

## 2019-09-05 PROCEDURE — 85025 COMPLETE CBC W/AUTO DIFF WBC: CPT | Performed by: EMERGENCY MEDICINE

## 2019-09-05 PROCEDURE — 25010000002 ONDANSETRON PER 1 MG: Performed by: EMERGENCY MEDICINE

## 2019-09-05 PROCEDURE — 99284 EMERGENCY DEPT VISIT MOD MDM: CPT

## 2019-09-05 PROCEDURE — 80053 COMPREHEN METABOLIC PANEL: CPT | Performed by: EMERGENCY MEDICINE

## 2019-09-05 PROCEDURE — 93005 ELECTROCARDIOGRAM TRACING: CPT | Performed by: EMERGENCY MEDICINE

## 2019-09-05 PROCEDURE — 93010 ELECTROCARDIOGRAM REPORT: CPT | Performed by: INTERNAL MEDICINE

## 2019-09-05 PROCEDURE — 70450 CT HEAD/BRAIN W/O DYE: CPT

## 2019-09-05 PROCEDURE — 81001 URINALYSIS AUTO W/SCOPE: CPT | Performed by: EMERGENCY MEDICINE

## 2019-09-05 PROCEDURE — 96374 THER/PROPH/DIAG INJ IV PUSH: CPT

## 2019-09-05 RX ORDER — ONDANSETRON 2 MG/ML
4 INJECTION INTRAMUSCULAR; INTRAVENOUS ONCE
Status: COMPLETED | OUTPATIENT
Start: 2019-09-05 | End: 2019-09-05

## 2019-09-05 RX ORDER — SODIUM CHLORIDE 0.9 % (FLUSH) 0.9 %
10 SYRINGE (ML) INJECTION AS NEEDED
Status: DISCONTINUED | OUTPATIENT
Start: 2019-09-05 | End: 2019-09-05 | Stop reason: HOSPADM

## 2019-09-05 RX ADMIN — ONDANSETRON HYDROCHLORIDE 4 MG: 2 SOLUTION INTRAMUSCULAR; INTRAVENOUS at 12:51

## 2019-09-13 ENCOUNTER — HOSPITAL ENCOUNTER (EMERGENCY)
Facility: HOSPITAL | Age: 74
Discharge: HOME | End: 2019-09-13
Attending: FAMILY MEDICINE | Admitting: FAMILY MEDICINE

## 2019-09-13 VITALS
SYSTOLIC BLOOD PRESSURE: 160 MMHG | DIASTOLIC BLOOD PRESSURE: 89 MMHG | HEIGHT: 64 IN | RESPIRATION RATE: 18 BRPM | OXYGEN SATURATION: 97 % | HEART RATE: 83 BPM | TEMPERATURE: 98 F | WEIGHT: 144 LBS | BODY MASS INDEX: 24.59 KG/M2

## 2019-09-13 DIAGNOSIS — R41.3 MEMORY CHANGE: Primary | ICD-10-CM

## 2019-09-13 PROCEDURE — 99283 EMERGENCY DEPT VISIT LOW MDM: CPT

## 2019-09-16 ENCOUNTER — TELEPHONE (OUTPATIENT)
Dept: INTERNAL MEDICINE | Age: 74
End: 2019-09-16

## 2019-09-19 ENCOUNTER — OFFICE VISIT (OUTPATIENT)
Dept: INTERNAL MEDICINE | Age: 74
End: 2019-09-19
Payer: MEDICARE

## 2019-09-19 VITALS
SYSTOLIC BLOOD PRESSURE: 128 MMHG | BODY MASS INDEX: 23.82 KG/M2 | HEIGHT: 65 IN | WEIGHT: 143 LBS | DIASTOLIC BLOOD PRESSURE: 88 MMHG | RESPIRATION RATE: 18 BRPM | OXYGEN SATURATION: 98 % | HEART RATE: 80 BPM

## 2019-09-19 DIAGNOSIS — R44.1 HALLUCINATION, VISUAL: ICD-10-CM

## 2019-09-19 DIAGNOSIS — I10 PAROXYSMAL HYPERTENSION: ICD-10-CM

## 2019-09-19 DIAGNOSIS — E78.00 PURE HYPERCHOLESTEROLEMIA: ICD-10-CM

## 2019-09-19 DIAGNOSIS — F02.80 LATE ONSET ALZHEIMER'S DISEASE WITHOUT BEHAVIORAL DISTURBANCE (HCC): Primary | ICD-10-CM

## 2019-09-19 DIAGNOSIS — G30.1 LATE ONSET ALZHEIMER'S DISEASE WITHOUT BEHAVIORAL DISTURBANCE (HCC): Primary | ICD-10-CM

## 2019-09-19 DIAGNOSIS — R41.3 MEMORY LOSS: ICD-10-CM

## 2019-09-19 PROBLEM — E05.90 HYPERTHYROIDISM: Status: RESOLVED | Noted: 2017-08-10 | Resolved: 2019-09-19

## 2019-09-19 PROCEDURE — 1036F TOBACCO NON-USER: CPT | Performed by: INTERNAL MEDICINE

## 2019-09-19 PROCEDURE — 4040F PNEUMOC VAC/ADMIN/RCVD: CPT | Performed by: INTERNAL MEDICINE

## 2019-09-19 PROCEDURE — 1123F ACP DISCUSS/DSCN MKR DOCD: CPT | Performed by: INTERNAL MEDICINE

## 2019-09-19 PROCEDURE — G8427 DOCREV CUR MEDS BY ELIG CLIN: HCPCS | Performed by: INTERNAL MEDICINE

## 2019-09-19 PROCEDURE — 1090F PRES/ABSN URINE INCON ASSESS: CPT | Performed by: INTERNAL MEDICINE

## 2019-09-19 PROCEDURE — G8598 ASA/ANTIPLAT THER USED: HCPCS | Performed by: INTERNAL MEDICINE

## 2019-09-19 PROCEDURE — G8400 PT W/DXA NO RESULTS DOC: HCPCS | Performed by: INTERNAL MEDICINE

## 2019-09-19 PROCEDURE — 3017F COLORECTAL CA SCREEN DOC REV: CPT | Performed by: INTERNAL MEDICINE

## 2019-09-19 PROCEDURE — G8420 CALC BMI NORM PARAMETERS: HCPCS | Performed by: INTERNAL MEDICINE

## 2019-09-19 PROCEDURE — 99214 OFFICE O/P EST MOD 30 MIN: CPT | Performed by: INTERNAL MEDICINE

## 2019-09-19 RX ORDER — DONEPEZIL HYDROCHLORIDE 5 MG/1
5 TABLET, FILM COATED ORAL NIGHTLY
Qty: 30 TABLET | Refills: 0 | Status: SHIPPED | OUTPATIENT
Start: 2019-09-19 | End: 2020-02-07

## 2019-09-19 ASSESSMENT — ENCOUNTER SYMPTOMS
COUGH: 0
CHEST TIGHTNESS: 0
SORE THROAT: 0
VOICE CHANGE: 0
SINUS PRESSURE: 0
CONSTIPATION: 0
BLOOD IN STOOL: 0
COLOR CHANGE: 0
ABDOMINAL PAIN: 0
DIARRHEA: 0
VOMITING: 0
TROUBLE SWALLOWING: 0
NAUSEA: 0
EYE REDNESS: 0
WHEEZING: 0
EYE PAIN: 0

## 2019-09-19 NOTE — PROGRESS NOTES
Chief Complaint   Patient presents with    Altered Mental Status     History of presenting illness:  Natalia Thomas is a73 y.o. female who presents today for follow up on her chronic medical conditions as noted below.     Increased issues with memory especially over last 2 months  Patient has seen at [facility] with memory issues and with visual hallucinations and confusion  Had extensive evaluation with MRI, had lab testing done CBC and CMP which were normal.  Patient now is here for further advice      Patient Active Problem List    Diagnosis Date Noted    Late onset Alzheimer's disease without behavioral disturbance 09/19/2019    Unspecified hearing loss, bilateral 06/04/2018    Tinnitus aurium, bilateral 06/04/2018    Vertigo, benign paroxysmal, right 06/04/2018    Benign paroxysmal positional vertigo due to bilateral vestibular disorder 05/15/2018    Allergic rhinitis 08/10/2017    Attention deficit disorder (ADD) without hyperactivity 08/10/2017    Auditory hallucinations 08/10/2017    Carotid atherosclerosis 08/10/2017    Cervicalgia 08/10/2017    Degeneration of cervical intervertebral disc 08/10/2017    Disc degeneration, lumbosacral 08/10/2017    Dizziness 08/10/2017    Dysuria 08/10/2017    Hypercalcemia 08/10/2017    Lactose intolerance 08/10/2017    Menopausal symptoms 08/10/2017    Microhematuria 08/10/2017    Palpitations 08/10/2017    Panic disorder without agoraphobia 08/10/2017    Paroxysmal hypertension 08/10/2017    Peripheral neuropathy 08/10/2017    Urine incontinence 08/10/2017    Pure hypercholesterolemia 08/05/2017    Generalized anxiety disorder 08/05/2017    Vitamin D deficiency 08/05/2017    Persistent insomnia 08/05/2017    Impaired fasting glucose 08/05/2017    Carotid artery stenosis 08/05/2017     Overview Note:     Rt carotid 50%; repeat 2014 internal ok/ external  At 50%      Osteopenia of both lower legs 08/05/2017     Overview Note: 12/15  Hip neck -1.9/ other nl      Fatigue 08/05/2017    Chronic vertigo 08/05/2017     Overview Note:     All eval in past has been negative      Nontoxic multinodular goiter 08/05/2017    OA (osteoarthritis) of knee 08/05/2017     Past Medical History:   Diagnosis Date    ADD (attention deficit disorder)     Carotid atherosclerosis     Degeneration of cervical intervertebral disc     Mixed hyperlipidemia     Nontoxic multinodular goiter     OA (osteoarthritis)     Osteopenia     Panic disorder without agoraphobia     Peripheral neuropathy     Pure hypercholesterolemia     Urinary incontinence     Vitamin D deficiency       Past Surgical History:   Procedure Laterality Date    COLONOSCOPY      HYSTERECTOMY, TOTAL ABDOMINAL      w/removal of both ovaries    JOINT REPLACEMENT      knee    TOTAL KNEE ARTHROPLASTY Right 2011    TOTAL KNEE ARTHROPLASTY Left 2012    VAGINA SURGERY      Abdomino-Vaginal Vesical Neck Suspension     Current Outpatient Medications   Medication Sig Dispense Refill    donepezil (ARICEPT) 5 MG tablet Take 1 tablet by mouth nightly 30 tablet 0    LORazepam (ATIVAN) 0.5 MG tablet Take 1 tablet by mouth daily as needed for Anxiety for up to 90 days. 20 tablet 0    meclizine (ANTIVERT) 25 MG tablet TAKE 1 TABLET BY MOUTH THREE TIMES DAILY AS NEEDED FOR DIZZINESS 90 tablet 0    atorvastatin (LIPITOR) 20 MG tablet Take 1 tablet by mouth daily 90 tablet 1    vitamin D (ERGOCALCIFEROL) 03192 units CAPS capsule Take 1 capsule by mouth once a week 12 capsule 3    tretinoin (RETIN-A) 0.1 % cream Apply topically nightly.  20 g 1    cetirizine (ZYRTEC) 10 MG tablet Take 10 mg by mouth daily      Ascorbic Acid (VITAMIN C) 500 MG tablet Take 500 mg by mouth daily      Coenzyme Q10 (CO Q-10) 200 MG CAPS Take 1 capsule by mouth daily       Chromium Picolinate 800 MCG TABS Take 1 tablet by mouth daily      Misc Natural Products (GINKOGIN PO) Take 50 mg by mouth daily       confusion, sleep disturbance and suicidal ideas. The patient is not nervous/anxious. Vitals:    09/19/19 1414   BP: 128/88   Site: Left Upper Arm   Position: Sitting   Cuff Size: Large Adult   Pulse: 80   Resp: 18   SpO2: 98%   Weight: 143 lb (64.9 kg)   Height: 5' 5\" (1.651 m)     Body mass index is 23.8 kg/m². Physical Exam   Constitutional: She appears well-developed and well-nourished. HENT:   Head: Normocephalic and atraumatic. Right Ear: External ear normal.   Left Ear: External ear normal.   Mouth/Throat: Oropharynx is clear and moist.   Eyes: Pupils are equal, round, and reactive to light. Conjunctivae are normal. No scleral icterus. Neck: Normal range of motion. Neck supple. No JVD present. No thyromegaly present. Cardiovascular: Normal rate, regular rhythm and normal heart sounds. No murmur heard. Pulmonary/Chest: Effort normal and breath sounds normal. No respiratory distress. She has no wheezes. She exhibits no tenderness. Abdominal: Soft. Bowel sounds are normal. She exhibits no mass. There is no tenderness. Musculoskeletal: She exhibits no edema or tenderness. Lymphadenopathy:     She has no cervical adenopathy. Neurological: She has normal reflexes. No cranial nerve deficit. Coordination normal.   Skin: Skin is warm and dry. No rash noted. No erythema. Psychiatric:   Mini mental today 13/30  ( was 25/30 in may 2019)       Lab Review   No visits with results within 2 Month(s) from this visit.    Latest known visit with results is:   Orders Only on 05/10/2019   Component Date Value    Vit D, 25-Hydroxy 05/10/2019 54.2     TSH 05/10/2019 3.030     Vitamin B-12 05/10/2019 254     Sodium 05/10/2019 144     Potassium 05/10/2019 4.9     Chloride 05/10/2019 103     CO2 05/10/2019 24     Anion Gap 05/10/2019 17     Glucose 05/10/2019 111*    BUN 05/10/2019 13     CREATININE 05/10/2019 0.7     GFR Non- 05/10/2019 >60     Calcium 05/10/2019 10.3*    Total

## 2019-09-20 ENCOUNTER — TELEPHONE (OUTPATIENT)
Dept: INTERNAL MEDICINE | Age: 74
End: 2019-09-20

## 2019-09-20 ENCOUNTER — OFFICE VISIT (OUTPATIENT)
Dept: URGENT CARE | Age: 74
End: 2019-09-20
Payer: MEDICARE

## 2019-09-20 VITALS
BODY MASS INDEX: 23.46 KG/M2 | SYSTOLIC BLOOD PRESSURE: 124 MMHG | DIASTOLIC BLOOD PRESSURE: 80 MMHG | RESPIRATION RATE: 16 BRPM | HEART RATE: 81 BPM | OXYGEN SATURATION: 98 % | WEIGHT: 141 LBS | TEMPERATURE: 98.4 F

## 2019-09-20 DIAGNOSIS — R09.82 POST-NASAL DRIP: Primary | ICD-10-CM

## 2019-09-20 DIAGNOSIS — R23.2 FACIAL FLUSHING: ICD-10-CM

## 2019-09-20 LAB
INFLUENZA A ANTIBODY: NEGATIVE
INFLUENZA B ANTIBODY: NEGATIVE
S PYO AG THROAT QL: NORMAL

## 2019-09-20 PROCEDURE — 87880 STREP A ASSAY W/OPTIC: CPT | Performed by: NURSE PRACTITIONER

## 2019-09-20 PROCEDURE — G8400 PT W/DXA NO RESULTS DOC: HCPCS | Performed by: NURSE PRACTITIONER

## 2019-09-20 PROCEDURE — 1090F PRES/ABSN URINE INCON ASSESS: CPT | Performed by: NURSE PRACTITIONER

## 2019-09-20 PROCEDURE — 99213 OFFICE O/P EST LOW 20 MIN: CPT | Performed by: NURSE PRACTITIONER

## 2019-09-20 PROCEDURE — G8420 CALC BMI NORM PARAMETERS: HCPCS | Performed by: NURSE PRACTITIONER

## 2019-09-20 PROCEDURE — 4040F PNEUMOC VAC/ADMIN/RCVD: CPT | Performed by: NURSE PRACTITIONER

## 2019-09-20 PROCEDURE — 1036F TOBACCO NON-USER: CPT | Performed by: NURSE PRACTITIONER

## 2019-09-20 PROCEDURE — 1123F ACP DISCUSS/DSCN MKR DOCD: CPT | Performed by: NURSE PRACTITIONER

## 2019-09-20 PROCEDURE — 3017F COLORECTAL CA SCREEN DOC REV: CPT | Performed by: NURSE PRACTITIONER

## 2019-09-20 PROCEDURE — G8427 DOCREV CUR MEDS BY ELIG CLIN: HCPCS | Performed by: NURSE PRACTITIONER

## 2019-09-20 PROCEDURE — G8598 ASA/ANTIPLAT THER USED: HCPCS | Performed by: NURSE PRACTITIONER

## 2019-09-20 PROCEDURE — 87804 INFLUENZA ASSAY W/OPTIC: CPT | Performed by: NURSE PRACTITIONER

## 2019-09-20 ASSESSMENT — ENCOUNTER SYMPTOMS: ROS SKIN COMMENTS: FACIAL FLUSHING

## 2019-09-23 ENCOUNTER — TELEPHONE (OUTPATIENT)
Dept: INTERNAL MEDICINE | Age: 74
End: 2019-09-23

## 2019-09-24 ENCOUNTER — TELEPHONE (OUTPATIENT)
Dept: INTERNAL MEDICINE | Age: 74
End: 2019-09-24

## 2019-10-09 ENCOUNTER — IMMUNIZATION (OUTPATIENT)
Dept: RETAIL CLINIC | Facility: CLINIC | Age: 74
End: 2019-10-09

## 2019-10-09 VITALS — TEMPERATURE: 98.3 F

## 2019-10-09 DIAGNOSIS — Z23 NEED FOR VACCINATION: Primary | ICD-10-CM

## 2019-10-09 PROCEDURE — G0008 ADMIN INFLUENZA VIRUS VAC: HCPCS | Performed by: NURSE PRACTITIONER

## 2019-10-09 PROCEDURE — 90653 IIV ADJUVANT VACCINE IM: CPT | Performed by: NURSE PRACTITIONER

## 2019-10-18 ENCOUNTER — OFFICE VISIT (OUTPATIENT)
Dept: NEUROLOGY | Age: 74
End: 2019-10-18
Payer: MEDICARE

## 2019-10-18 VITALS
WEIGHT: 140 LBS | DIASTOLIC BLOOD PRESSURE: 78 MMHG | HEIGHT: 65 IN | BODY MASS INDEX: 23.32 KG/M2 | SYSTOLIC BLOOD PRESSURE: 128 MMHG | HEART RATE: 67 BPM

## 2019-10-18 DIAGNOSIS — R41.3 MEMORY LOSS: Primary | ICD-10-CM

## 2019-10-18 DIAGNOSIS — Z86.39 HISTORY OF HYPERLIPIDEMIA: ICD-10-CM

## 2019-10-18 LAB — VITAMIN B-12: 228 PG/ML (ref 211–946)

## 2019-10-18 PROCEDURE — G8598 ASA/ANTIPLAT THER USED: HCPCS | Performed by: PSYCHIATRY & NEUROLOGY

## 2019-10-18 PROCEDURE — 99204 OFFICE O/P NEW MOD 45 MIN: CPT | Performed by: PSYCHIATRY & NEUROLOGY

## 2019-10-18 PROCEDURE — G8427 DOCREV CUR MEDS BY ELIG CLIN: HCPCS | Performed by: PSYCHIATRY & NEUROLOGY

## 2019-10-18 PROCEDURE — 4040F PNEUMOC VAC/ADMIN/RCVD: CPT | Performed by: PSYCHIATRY & NEUROLOGY

## 2019-10-18 PROCEDURE — G8484 FLU IMMUNIZE NO ADMIN: HCPCS | Performed by: PSYCHIATRY & NEUROLOGY

## 2019-10-18 PROCEDURE — G8400 PT W/DXA NO RESULTS DOC: HCPCS | Performed by: PSYCHIATRY & NEUROLOGY

## 2019-10-18 PROCEDURE — 3017F COLORECTAL CA SCREEN DOC REV: CPT | Performed by: PSYCHIATRY & NEUROLOGY

## 2019-10-18 PROCEDURE — 1123F ACP DISCUSS/DSCN MKR DOCD: CPT | Performed by: PSYCHIATRY & NEUROLOGY

## 2019-10-18 PROCEDURE — 1036F TOBACCO NON-USER: CPT | Performed by: PSYCHIATRY & NEUROLOGY

## 2019-10-18 PROCEDURE — G8420 CALC BMI NORM PARAMETERS: HCPCS | Performed by: PSYCHIATRY & NEUROLOGY

## 2019-10-18 PROCEDURE — 1090F PRES/ABSN URINE INCON ASSESS: CPT | Performed by: PSYCHIATRY & NEUROLOGY

## 2019-10-18 RX ORDER — DONEPEZIL HYDROCHLORIDE 10 MG/1
10 TABLET, FILM COATED ORAL NIGHTLY
Qty: 30 TABLET | Refills: 5 | Status: SHIPPED | OUTPATIENT
Start: 2019-10-18 | End: 2020-03-09

## 2019-10-28 ENCOUNTER — TELEPHONE (OUTPATIENT)
Dept: INTERNAL MEDICINE | Age: 74
End: 2019-10-28

## 2019-11-08 ENCOUNTER — OFFICE VISIT (OUTPATIENT)
Dept: GASTROENTEROLOGY | Facility: CLINIC | Age: 74
End: 2019-11-08

## 2019-11-08 VITALS
OXYGEN SATURATION: 99 % | WEIGHT: 129 LBS | DIASTOLIC BLOOD PRESSURE: 84 MMHG | SYSTOLIC BLOOD PRESSURE: 138 MMHG | HEIGHT: 65 IN | BODY MASS INDEX: 21.49 KG/M2 | TEMPERATURE: 97.5 F | HEART RATE: 69 BPM

## 2019-11-08 DIAGNOSIS — R13.19 ESOPHAGEAL DYSPHAGIA: ICD-10-CM

## 2019-11-08 DIAGNOSIS — K58.0 IRRITABLE BOWEL SYNDROME WITH DIARRHEA: Primary | ICD-10-CM

## 2019-11-08 DIAGNOSIS — Z12.11 ENCOUNTER FOR SCREENING FOR MALIGNANT NEOPLASM OF COLON: ICD-10-CM

## 2019-11-08 DIAGNOSIS — Z78.9 NONSMOKER: ICD-10-CM

## 2019-11-08 PROCEDURE — 99214 OFFICE O/P EST MOD 30 MIN: CPT | Performed by: CLINICAL NURSE SPECIALIST

## 2019-11-08 RX ORDER — ASCORBIC ACID 500 MG
500 TABLET ORAL DAILY
COMMUNITY

## 2019-11-08 RX ORDER — DONEPEZIL HYDROCHLORIDE 10 MG/1
10 TABLET, FILM COATED ORAL NIGHTLY
COMMUNITY

## 2019-11-08 RX ORDER — DONEPEZIL HYDROCHLORIDE 5 MG/1
5 TABLET, FILM COATED ORAL NIGHTLY
COMMUNITY

## 2019-11-08 RX ORDER — SODIUM, POTASSIUM,MAG SULFATES 17.5-3.13G
SOLUTION, RECONSTITUTED, ORAL ORAL
Qty: 2 BOTTLE | Refills: 0 | Status: SHIPPED | OUTPATIENT
Start: 2019-11-08

## 2019-11-13 ENCOUNTER — OFFICE VISIT (OUTPATIENT)
Dept: INTERNAL MEDICINE | Age: 74
End: 2019-11-13
Payer: MEDICARE

## 2019-11-13 VITALS
WEIGHT: 133 LBS | OXYGEN SATURATION: 98 % | HEART RATE: 88 BPM | BODY MASS INDEX: 22.16 KG/M2 | DIASTOLIC BLOOD PRESSURE: 74 MMHG | HEIGHT: 65 IN | SYSTOLIC BLOOD PRESSURE: 132 MMHG

## 2019-11-13 DIAGNOSIS — L03.011 INFECTED NAILBED OF FINGER, RIGHT: ICD-10-CM

## 2019-11-13 DIAGNOSIS — S60.10XA SUBUNGUAL HEMATOMA OF DIGIT OF HAND, INITIAL ENCOUNTER: Primary | ICD-10-CM

## 2019-11-13 DIAGNOSIS — S61.309A AVULSION OF FINGERNAIL, INITIAL ENCOUNTER: ICD-10-CM

## 2019-11-13 PROCEDURE — 1123F ACP DISCUSS/DSCN MKR DOCD: CPT | Performed by: INTERNAL MEDICINE

## 2019-11-13 PROCEDURE — 1036F TOBACCO NON-USER: CPT | Performed by: INTERNAL MEDICINE

## 2019-11-13 PROCEDURE — G8598 ASA/ANTIPLAT THER USED: HCPCS | Performed by: INTERNAL MEDICINE

## 2019-11-13 PROCEDURE — 4040F PNEUMOC VAC/ADMIN/RCVD: CPT | Performed by: INTERNAL MEDICINE

## 2019-11-13 PROCEDURE — 99213 OFFICE O/P EST LOW 20 MIN: CPT | Performed by: INTERNAL MEDICINE

## 2019-11-13 PROCEDURE — 1090F PRES/ABSN URINE INCON ASSESS: CPT | Performed by: INTERNAL MEDICINE

## 2019-11-13 PROCEDURE — G8484 FLU IMMUNIZE NO ADMIN: HCPCS | Performed by: INTERNAL MEDICINE

## 2019-11-13 PROCEDURE — G8400 PT W/DXA NO RESULTS DOC: HCPCS | Performed by: INTERNAL MEDICINE

## 2019-11-13 PROCEDURE — G8420 CALC BMI NORM PARAMETERS: HCPCS | Performed by: INTERNAL MEDICINE

## 2019-11-13 PROCEDURE — 3017F COLORECTAL CA SCREEN DOC REV: CPT | Performed by: INTERNAL MEDICINE

## 2019-11-13 PROCEDURE — G8427 DOCREV CUR MEDS BY ELIG CLIN: HCPCS | Performed by: INTERNAL MEDICINE

## 2019-11-13 RX ORDER — SULFAMETHOXAZOLE AND TRIMETHOPRIM 800; 160 MG/1; MG/1
1 TABLET ORAL 2 TIMES DAILY
Qty: 14 TABLET | Refills: 0 | Status: SHIPPED | OUTPATIENT
Start: 2019-11-13 | End: 2019-11-20

## 2019-11-13 ASSESSMENT — ENCOUNTER SYMPTOMS
CHEST TIGHTNESS: 0
ABDOMINAL PAIN: 0
WHEEZING: 0
COUGH: 0
CONSTIPATION: 0
SORE THROAT: 0

## 2019-11-19 ENCOUNTER — HOSPITAL ENCOUNTER (EMERGENCY)
Facility: HOSPITAL | Age: 74
Discharge: HOME | End: 2019-11-19
Attending: INTERNAL MEDICINE | Admitting: INTERNAL MEDICINE

## 2019-11-19 VITALS
TEMPERATURE: 98.1 F | DIASTOLIC BLOOD PRESSURE: 91 MMHG | OXYGEN SATURATION: 97 % | WEIGHT: 132 LBS | SYSTOLIC BLOOD PRESSURE: 144 MMHG | HEIGHT: 65 IN | BODY MASS INDEX: 21.99 KG/M2 | RESPIRATION RATE: 18 BRPM | HEART RATE: 82 BPM

## 2019-11-19 DIAGNOSIS — L03.011 CELLULITIS OF FINGER OF RIGHT HAND: Primary | ICD-10-CM

## 2019-11-19 PROCEDURE — 99283 EMERGENCY DEPT VISIT LOW MDM: CPT

## 2019-11-19 RX ORDER — CEPHALEXIN 250 MG/1
250 CAPSULE ORAL 3 TIMES DAILY
Qty: 15 CAPSULE | Refills: 0 | Status: SHIPPED | OUTPATIENT
Start: 2019-11-19

## 2019-11-19 RX ORDER — CEPHALEXIN 500 MG/1
500 CAPSULE ORAL ONCE
Status: COMPLETED | OUTPATIENT
Start: 2019-11-19 | End: 2019-11-19

## 2019-11-19 RX ADMIN — CEPHALEXIN 500 MG: 500 CAPSULE ORAL at 05:36

## 2019-11-22 ENCOUNTER — OFFICE VISIT (OUTPATIENT)
Dept: INTERNAL MEDICINE | Age: 74
End: 2019-11-22
Payer: MEDICARE

## 2019-11-22 VITALS — DIASTOLIC BLOOD PRESSURE: 68 MMHG | HEART RATE: 84 BPM | SYSTOLIC BLOOD PRESSURE: 122 MMHG | OXYGEN SATURATION: 98 %

## 2019-11-22 DIAGNOSIS — B35.1 NAIL FUNGAL INFECTION: ICD-10-CM

## 2019-11-22 PROCEDURE — 1036F TOBACCO NON-USER: CPT | Performed by: INTERNAL MEDICINE

## 2019-11-22 PROCEDURE — G8420 CALC BMI NORM PARAMETERS: HCPCS | Performed by: INTERNAL MEDICINE

## 2019-11-22 PROCEDURE — 1123F ACP DISCUSS/DSCN MKR DOCD: CPT | Performed by: INTERNAL MEDICINE

## 2019-11-22 PROCEDURE — 99213 OFFICE O/P EST LOW 20 MIN: CPT | Performed by: INTERNAL MEDICINE

## 2019-11-22 PROCEDURE — 3017F COLORECTAL CA SCREEN DOC REV: CPT | Performed by: INTERNAL MEDICINE

## 2019-11-22 PROCEDURE — 1090F PRES/ABSN URINE INCON ASSESS: CPT | Performed by: INTERNAL MEDICINE

## 2019-11-22 PROCEDURE — 4040F PNEUMOC VAC/ADMIN/RCVD: CPT | Performed by: INTERNAL MEDICINE

## 2019-11-22 PROCEDURE — G8484 FLU IMMUNIZE NO ADMIN: HCPCS | Performed by: INTERNAL MEDICINE

## 2019-11-22 PROCEDURE — G8598 ASA/ANTIPLAT THER USED: HCPCS | Performed by: INTERNAL MEDICINE

## 2019-11-22 PROCEDURE — G8400 PT W/DXA NO RESULTS DOC: HCPCS | Performed by: INTERNAL MEDICINE

## 2019-11-22 PROCEDURE — G8427 DOCREV CUR MEDS BY ELIG CLIN: HCPCS | Performed by: INTERNAL MEDICINE

## 2019-11-22 RX ORDER — CLOTRIMAZOLE AND BETAMETHASONE DIPROPIONATE 10; .64 MG/G; MG/G
CREAM TOPICAL
Qty: 45 G | Refills: 1 | Status: ON HOLD | OUTPATIENT
Start: 2019-11-22 | End: 2021-04-23 | Stop reason: HOSPADM

## 2019-12-02 ENCOUNTER — HOSPITAL ENCOUNTER (EMERGENCY)
Facility: HOSPITAL | Age: 74
Discharge: HOME | End: 2019-12-02
Attending: FAMILY MEDICINE | Admitting: FAMILY MEDICINE

## 2019-12-02 VITALS
RESPIRATION RATE: 16 BRPM | HEIGHT: 65 IN | HEART RATE: 73 BPM | WEIGHT: 130 LBS | DIASTOLIC BLOOD PRESSURE: 76 MMHG | OXYGEN SATURATION: 96 % | SYSTOLIC BLOOD PRESSURE: 128 MMHG | TEMPERATURE: 97.9 F | BODY MASS INDEX: 21.66 KG/M2

## 2019-12-02 DIAGNOSIS — R10.9 ABDOMINAL PAIN, UNSPECIFIED ABDOMINAL LOCATION: Primary | ICD-10-CM

## 2019-12-02 LAB
ALBUMIN SERPL-MCNC: 4.3 G/DL (ref 3.5–5.2)
ALBUMIN/GLOB SERPL: 1.9 G/DL
ALP SERPL-CCNC: 79 U/L (ref 39–117)
ALT SERPL W P-5'-P-CCNC: 16 U/L (ref 1–33)
ANION GAP SERPL CALCULATED.3IONS-SCNC: 11 MMOL/L (ref 5–15)
AST SERPL-CCNC: 26 U/L (ref 1–32)
BASOPHILS # BLD AUTO: 0.07 10*3/MM3 (ref 0–0.2)
BASOPHILS NFR BLD AUTO: 1.1 % (ref 0–1.5)
BILIRUB SERPL-MCNC: 0.5 MG/DL (ref 0.2–1.2)
BUN BLD-MCNC: 12 MG/DL (ref 8–23)
BUN/CREAT SERPL: 16.4 (ref 7–25)
CALCIUM SPEC-SCNC: 9.4 MG/DL (ref 8.6–10.5)
CHLORIDE SERPL-SCNC: 105 MMOL/L (ref 98–107)
CO2 SERPL-SCNC: 27 MMOL/L (ref 22–29)
CREAT BLD-MCNC: 0.73 MG/DL (ref 0.57–1)
DEPRECATED RDW RBC AUTO: 42.1 FL (ref 37–54)
EOSINOPHIL # BLD AUTO: 0.05 10*3/MM3 (ref 0–0.4)
EOSINOPHIL NFR BLD AUTO: 0.8 % (ref 0.3–6.2)
ERYTHROCYTE [DISTWIDTH] IN BLOOD BY AUTOMATED COUNT: 13.5 % (ref 12.3–15.4)
GFR SERPL CREATININE-BSD FRML MDRD: 78 ML/MIN/1.73
GLOBULIN UR ELPH-MCNC: 2.3 GM/DL
GLUCOSE BLD-MCNC: 127 MG/DL (ref 65–99)
HCT VFR BLD AUTO: 40.5 % (ref 34–46.6)
HGB BLD-MCNC: 13.7 G/DL (ref 12–15.9)
IMM GRANULOCYTES # BLD AUTO: 0.02 10*3/MM3 (ref 0–0.05)
IMM GRANULOCYTES NFR BLD AUTO: 0.3 % (ref 0–0.5)
LIPASE SERPL-CCNC: 30 U/L (ref 13–60)
LYMPHOCYTES # BLD AUTO: 2.34 10*3/MM3 (ref 0.7–3.1)
LYMPHOCYTES NFR BLD AUTO: 35.3 % (ref 19.6–45.3)
MCH RBC QN AUTO: 29.3 PG (ref 26.6–33)
MCHC RBC AUTO-ENTMCNC: 33.8 G/DL (ref 31.5–35.7)
MCV RBC AUTO: 86.7 FL (ref 79–97)
MONOCYTES # BLD AUTO: 0.52 10*3/MM3 (ref 0.1–0.9)
MONOCYTES NFR BLD AUTO: 7.8 % (ref 5–12)
NEUTROPHILS # BLD AUTO: 3.63 10*3/MM3 (ref 1.7–7)
NEUTROPHILS NFR BLD AUTO: 54.7 % (ref 42.7–76)
NRBC BLD AUTO-RTO: 0 /100 WBC (ref 0–0.2)
PLATELET # BLD AUTO: 212 10*3/MM3 (ref 140–450)
PMV BLD AUTO: 11.2 FL (ref 6–12)
POTASSIUM BLD-SCNC: 3.6 MMOL/L (ref 3.5–5.2)
PROT SERPL-MCNC: 6.6 G/DL (ref 6–8.5)
RBC # BLD AUTO: 4.67 10*6/MM3 (ref 3.77–5.28)
SODIUM BLD-SCNC: 143 MMOL/L (ref 136–145)
WBC NRBC COR # BLD: 6.63 10*3/MM3 (ref 3.4–10.8)

## 2019-12-02 PROCEDURE — 83690 ASSAY OF LIPASE: CPT | Performed by: FAMILY MEDICINE

## 2019-12-02 PROCEDURE — 36415 COLL VENOUS BLD VENIPUNCTURE: CPT

## 2019-12-02 PROCEDURE — 99282 EMERGENCY DEPT VISIT SF MDM: CPT

## 2019-12-02 PROCEDURE — 85025 COMPLETE CBC W/AUTO DIFF WBC: CPT | Performed by: FAMILY MEDICINE

## 2019-12-02 PROCEDURE — 80053 COMPREHEN METABOLIC PANEL: CPT | Performed by: FAMILY MEDICINE

## 2019-12-19 ENCOUNTER — TELEPHONE (OUTPATIENT)
Dept: GASTROENTEROLOGY | Facility: HOSPITAL | Age: 74
End: 2019-12-19

## 2019-12-20 ENCOUNTER — OFFICE VISIT (OUTPATIENT)
Dept: INTERNAL MEDICINE | Age: 74
End: 2019-12-20
Payer: MEDICARE

## 2019-12-20 VITALS
BODY MASS INDEX: 21.49 KG/M2 | OXYGEN SATURATION: 96 % | HEART RATE: 66 BPM | SYSTOLIC BLOOD PRESSURE: 108 MMHG | WEIGHT: 129 LBS | DIASTOLIC BLOOD PRESSURE: 62 MMHG | RESPIRATION RATE: 20 BRPM | HEIGHT: 65 IN

## 2019-12-20 DIAGNOSIS — E55.9 VITAMIN D DEFICIENCY: ICD-10-CM

## 2019-12-20 DIAGNOSIS — E53.8 B12 DEFICIENCY: ICD-10-CM

## 2019-12-20 DIAGNOSIS — F41.1 GENERALIZED ANXIETY DISORDER: ICD-10-CM

## 2019-12-20 DIAGNOSIS — R73.01 IMPAIRED FASTING GLUCOSE: ICD-10-CM

## 2019-12-20 DIAGNOSIS — E05.90 HYPERTHYROIDISM: ICD-10-CM

## 2019-12-20 DIAGNOSIS — R41.3 MEMORY CHANGES: Primary | ICD-10-CM

## 2019-12-20 DIAGNOSIS — R63.4 WEIGHT LOSS: ICD-10-CM

## 2019-12-20 DIAGNOSIS — R41.3 MEMORY CHANGES: ICD-10-CM

## 2019-12-20 DIAGNOSIS — R19.7 DIARRHEA, UNSPECIFIED TYPE: ICD-10-CM

## 2019-12-20 DIAGNOSIS — E78.00 PURE HYPERCHOLESTEROLEMIA: ICD-10-CM

## 2019-12-20 LAB
T4 FREE: 1.35 NG/DL (ref 0.93–1.7)
TSH SERPL DL<=0.05 MIU/L-ACNC: 2.35 UIU/ML (ref 0.27–4.2)
VITAMIN B-12: >2000 PG/ML (ref 211–946)

## 2019-12-20 PROCEDURE — G8598 ASA/ANTIPLAT THER USED: HCPCS | Performed by: INTERNAL MEDICINE

## 2019-12-20 PROCEDURE — 4040F PNEUMOC VAC/ADMIN/RCVD: CPT | Performed by: INTERNAL MEDICINE

## 2019-12-20 PROCEDURE — G8427 DOCREV CUR MEDS BY ELIG CLIN: HCPCS | Performed by: INTERNAL MEDICINE

## 2019-12-20 PROCEDURE — 96372 THER/PROPH/DIAG INJ SC/IM: CPT | Performed by: INTERNAL MEDICINE

## 2019-12-20 PROCEDURE — 1036F TOBACCO NON-USER: CPT | Performed by: INTERNAL MEDICINE

## 2019-12-20 PROCEDURE — 99214 OFFICE O/P EST MOD 30 MIN: CPT | Performed by: INTERNAL MEDICINE

## 2019-12-20 PROCEDURE — G8400 PT W/DXA NO RESULTS DOC: HCPCS | Performed by: INTERNAL MEDICINE

## 2019-12-20 PROCEDURE — 3017F COLORECTAL CA SCREEN DOC REV: CPT | Performed by: INTERNAL MEDICINE

## 2019-12-20 PROCEDURE — 1123F ACP DISCUSS/DSCN MKR DOCD: CPT | Performed by: INTERNAL MEDICINE

## 2019-12-20 PROCEDURE — 1090F PRES/ABSN URINE INCON ASSESS: CPT | Performed by: INTERNAL MEDICINE

## 2019-12-20 PROCEDURE — G8484 FLU IMMUNIZE NO ADMIN: HCPCS | Performed by: INTERNAL MEDICINE

## 2019-12-20 PROCEDURE — G8420 CALC BMI NORM PARAMETERS: HCPCS | Performed by: INTERNAL MEDICINE

## 2019-12-20 RX ORDER — CYANOCOBALAMIN 1000 UG/ML
1000 INJECTION INTRAMUSCULAR; SUBCUTANEOUS ONCE
Status: COMPLETED | OUTPATIENT
Start: 2019-12-20 | End: 2019-12-20

## 2019-12-20 RX ORDER — MONTELUKAST SODIUM 4 MG/1
1 TABLET, CHEWABLE ORAL DAILY
Qty: 30 TABLET | Refills: 3 | Status: SHIPPED | OUTPATIENT
Start: 2019-12-20

## 2019-12-20 RX ADMIN — CYANOCOBALAMIN 1000 MCG: 1000 INJECTION INTRAMUSCULAR; SUBCUTANEOUS at 11:56

## 2019-12-20 ASSESSMENT — ENCOUNTER SYMPTOMS
COUGH: 0
ABDOMINAL PAIN: 0
DIARRHEA: 1
CONSTIPATION: 0
CHEST TIGHTNESS: 0
SORE THROAT: 0
WHEEZING: 0

## 2020-01-02 RX ORDER — MECLIZINE HYDROCHLORIDE 25 MG/1
TABLET ORAL
Qty: 30 TABLET | Refills: 1 | Status: SHIPPED | OUTPATIENT
Start: 2020-01-02 | End: 2020-04-27

## 2020-01-10 ENCOUNTER — APPOINTMENT (OUTPATIENT)
Dept: GENERAL RADIOLOGY | Facility: HOSPITAL | Age: 75
End: 2020-01-10

## 2020-01-10 ENCOUNTER — HOSPITAL ENCOUNTER (EMERGENCY)
Facility: HOSPITAL | Age: 75
Discharge: HOME | End: 2020-01-10
Attending: FAMILY MEDICINE | Admitting: FAMILY MEDICINE

## 2020-01-10 VITALS
BODY MASS INDEX: 20.33 KG/M2 | SYSTOLIC BLOOD PRESSURE: 164 MMHG | HEIGHT: 65 IN | TEMPERATURE: 98.1 F | RESPIRATION RATE: 18 BRPM | WEIGHT: 122 LBS | HEART RATE: 69 BPM | OXYGEN SATURATION: 97 % | DIASTOLIC BLOOD PRESSURE: 73 MMHG

## 2020-01-10 DIAGNOSIS — F03.90 DEMENTIA WITHOUT BEHAVIORAL DISTURBANCE, UNSPECIFIED DEMENTIA TYPE: Primary | ICD-10-CM

## 2020-01-10 LAB
ALBUMIN SERPL-MCNC: 4.3 G/DL (ref 3.5–5.2)
ALBUMIN/GLOB SERPL: 1.7 G/DL
ALP SERPL-CCNC: 103 U/L (ref 39–117)
ALT SERPL W P-5'-P-CCNC: 13 U/L (ref 1–33)
ANION GAP SERPL CALCULATED.3IONS-SCNC: 15 MMOL/L (ref 5–15)
AST SERPL-CCNC: 13 U/L (ref 1–32)
BACTERIA UR QL AUTO: ABNORMAL /HPF
BASOPHILS # BLD AUTO: 0.08 10*3/MM3 (ref 0–0.2)
BASOPHILS NFR BLD AUTO: 0.8 % (ref 0–1.5)
BILIRUB SERPL-MCNC: 0.6 MG/DL (ref 0.2–1.2)
BILIRUB UR QL STRIP: NEGATIVE
BUN BLD-MCNC: 13 MG/DL (ref 8–23)
BUN/CREAT SERPL: 25.5 (ref 7–25)
CALCIUM SPEC-SCNC: 9.6 MG/DL (ref 8.6–10.5)
CHLORIDE SERPL-SCNC: 104 MMOL/L (ref 98–107)
CLARITY UR: CLEAR
CO2 SERPL-SCNC: 24 MMOL/L (ref 22–29)
COLOR UR: YELLOW
CREAT BLD-MCNC: 0.51 MG/DL (ref 0.57–1)
DEPRECATED RDW RBC AUTO: 42.5 FL (ref 37–54)
EOSINOPHIL # BLD AUTO: 0.02 10*3/MM3 (ref 0–0.4)
EOSINOPHIL NFR BLD AUTO: 0.2 % (ref 0.3–6.2)
ERYTHROCYTE [DISTWIDTH] IN BLOOD BY AUTOMATED COUNT: 13.6 % (ref 12.3–15.4)
GFR SERPL CREATININE-BSD FRML MDRD: 118 ML/MIN/1.73
GLOBULIN UR ELPH-MCNC: 2.5 GM/DL
GLUCOSE BLD-MCNC: 126 MG/DL (ref 65–99)
GLUCOSE BLDC GLUCOMTR-MCNC: 122 MG/DL (ref 70–130)
GLUCOSE UR STRIP-MCNC: NEGATIVE MG/DL
HCT VFR BLD AUTO: 40.4 % (ref 34–46.6)
HGB BLD-MCNC: 14.3 G/DL (ref 12–15.9)
HGB UR QL STRIP.AUTO: ABNORMAL
HOLD SPECIMEN: NORMAL
HOLD SPECIMEN: NORMAL
HYALINE CASTS UR QL AUTO: ABNORMAL /LPF
IMM GRANULOCYTES # BLD AUTO: 0.01 10*3/MM3 (ref 0–0.05)
IMM GRANULOCYTES NFR BLD AUTO: 0.1 % (ref 0–0.5)
KETONES UR QL STRIP: ABNORMAL
LEUKOCYTE ESTERASE UR QL STRIP.AUTO: NEGATIVE
LYMPHOCYTES # BLD AUTO: 1.3 10*3/MM3 (ref 0.7–3.1)
LYMPHOCYTES NFR BLD AUTO: 13.7 % (ref 19.6–45.3)
MCH RBC QN AUTO: 30.2 PG (ref 26.6–33)
MCHC RBC AUTO-ENTMCNC: 35.4 G/DL (ref 31.5–35.7)
MCV RBC AUTO: 85.4 FL (ref 79–97)
MONOCYTES # BLD AUTO: 0.38 10*3/MM3 (ref 0.1–0.9)
MONOCYTES NFR BLD AUTO: 4 % (ref 5–12)
NEUTROPHILS # BLD AUTO: 7.71 10*3/MM3 (ref 1.7–7)
NEUTROPHILS NFR BLD AUTO: 81.2 % (ref 42.7–76)
NITRITE UR QL STRIP: NEGATIVE
NRBC BLD AUTO-RTO: 0 /100 WBC (ref 0–0.2)
PH UR STRIP.AUTO: 6 [PH] (ref 5–8)
PLATELET # BLD AUTO: 222 10*3/MM3 (ref 140–450)
PMV BLD AUTO: 11.8 FL (ref 6–12)
POTASSIUM BLD-SCNC: 3.9 MMOL/L (ref 3.5–5.2)
PROT SERPL-MCNC: 6.8 G/DL (ref 6–8.5)
PROT UR QL STRIP: NEGATIVE
RBC # BLD AUTO: 4.73 10*6/MM3 (ref 3.77–5.28)
RBC # UR: ABNORMAL /HPF
REF LAB TEST METHOD: ABNORMAL
SODIUM BLD-SCNC: 143 MMOL/L (ref 136–145)
SP GR UR STRIP: >=1.03 (ref 1–1.03)
SQUAMOUS #/AREA URNS HPF: ABNORMAL /HPF
UROBILINOGEN UR QL STRIP: ABNORMAL
WBC NRBC COR # BLD: 9.5 10*3/MM3 (ref 3.4–10.8)
WBC UR QL AUTO: ABNORMAL /HPF
WHOLE BLOOD HOLD SPECIMEN: NORMAL
WHOLE BLOOD HOLD SPECIMEN: NORMAL

## 2020-01-10 PROCEDURE — 85025 COMPLETE CBC W/AUTO DIFF WBC: CPT | Performed by: FAMILY MEDICINE

## 2020-01-10 PROCEDURE — 93005 ELECTROCARDIOGRAM TRACING: CPT | Performed by: FAMILY MEDICINE

## 2020-01-10 PROCEDURE — 99285 EMERGENCY DEPT VISIT HI MDM: CPT

## 2020-01-10 PROCEDURE — 93010 ELECTROCARDIOGRAM REPORT: CPT | Performed by: INTERNAL MEDICINE

## 2020-01-10 PROCEDURE — 82962 GLUCOSE BLOOD TEST: CPT

## 2020-01-10 PROCEDURE — 71045 X-RAY EXAM CHEST 1 VIEW: CPT

## 2020-01-10 PROCEDURE — 81001 URINALYSIS AUTO W/SCOPE: CPT | Performed by: FAMILY MEDICINE

## 2020-01-10 PROCEDURE — 80053 COMPREHEN METABOLIC PANEL: CPT | Performed by: FAMILY MEDICINE

## 2020-01-10 RX ORDER — SODIUM CHLORIDE 0.9 % (FLUSH) 0.9 %
10 SYRINGE (ML) INJECTION AS NEEDED
Status: DISCONTINUED | OUTPATIENT
Start: 2020-01-10 | End: 2020-01-11 | Stop reason: HOSPADM

## 2020-01-10 RX ADMIN — SODIUM CHLORIDE 500 ML: 9 INJECTION, SOLUTION INTRAVENOUS at 20:17

## 2020-01-22 ENCOUNTER — APPOINTMENT (OUTPATIENT)
Dept: CT IMAGING | Facility: HOSPITAL | Age: 75
End: 2020-01-22

## 2020-01-22 ENCOUNTER — TELEPHONE (OUTPATIENT)
Dept: INTERNAL MEDICINE | Age: 75
End: 2020-01-22

## 2020-01-22 ENCOUNTER — HOSPITAL ENCOUNTER (EMERGENCY)
Facility: HOSPITAL | Age: 75
Discharge: HOME | End: 2020-01-22
Admitting: INTERNAL MEDICINE

## 2020-01-22 VITALS
WEIGHT: 130 LBS | RESPIRATION RATE: 18 BRPM | BODY MASS INDEX: 21.66 KG/M2 | DIASTOLIC BLOOD PRESSURE: 63 MMHG | HEIGHT: 65 IN | TEMPERATURE: 97.8 F | OXYGEN SATURATION: 95 % | HEART RATE: 89 BPM | SYSTOLIC BLOOD PRESSURE: 137 MMHG

## 2020-01-22 DIAGNOSIS — F03.91 DEMENTIA WITH BEHAVIORAL DISTURBANCE, UNSPECIFIED DEMENTIA TYPE: Primary | ICD-10-CM

## 2020-01-22 LAB
ALBUMIN SERPL-MCNC: 4.3 G/DL (ref 3.5–5.2)
ALBUMIN/GLOB SERPL: 1.8 G/DL
ALP SERPL-CCNC: 95 U/L (ref 39–117)
ALT SERPL W P-5'-P-CCNC: 11 U/L (ref 1–33)
ANION GAP SERPL CALCULATED.3IONS-SCNC: 12 MMOL/L (ref 5–15)
AST SERPL-CCNC: 12 U/L (ref 1–32)
BACTERIA UR QL AUTO: ABNORMAL /HPF
BASOPHILS # BLD AUTO: 0.1 10*3/MM3 (ref 0–0.2)
BASOPHILS NFR BLD AUTO: 1.1 % (ref 0–1.5)
BILIRUB SERPL-MCNC: 0.6 MG/DL (ref 0.2–1.2)
BILIRUB UR QL STRIP: NEGATIVE
BUN BLD-MCNC: 17 MG/DL (ref 8–23)
BUN/CREAT SERPL: 27 (ref 7–25)
CALCIUM SPEC-SCNC: 9.9 MG/DL (ref 8.6–10.5)
CHLORIDE SERPL-SCNC: 107 MMOL/L (ref 98–107)
CLARITY UR: CLEAR
CO2 SERPL-SCNC: 23 MMOL/L (ref 22–29)
COD CRY URNS QL: ABNORMAL /HPF
COLOR UR: YELLOW
CREAT BLD-MCNC: 0.63 MG/DL (ref 0.57–1)
DEPRECATED RDW RBC AUTO: 40.9 FL (ref 37–54)
EOSINOPHIL # BLD AUTO: 0.02 10*3/MM3 (ref 0–0.4)
EOSINOPHIL NFR BLD AUTO: 0.2 % (ref 0.3–6.2)
ERYTHROCYTE [DISTWIDTH] IN BLOOD BY AUTOMATED COUNT: 13.2 % (ref 12.3–15.4)
GFR SERPL CREATININE-BSD FRML MDRD: 92 ML/MIN/1.73
GLOBULIN UR ELPH-MCNC: 2.4 GM/DL
GLUCOSE BLD-MCNC: 171 MG/DL (ref 65–99)
GLUCOSE UR STRIP-MCNC: NEGATIVE MG/DL
HCT VFR BLD AUTO: 40.6 % (ref 34–46.6)
HGB BLD-MCNC: 13.9 G/DL (ref 12–15.9)
HGB UR QL STRIP.AUTO: ABNORMAL
HYALINE CASTS UR QL AUTO: ABNORMAL /LPF
IMM GRANULOCYTES # BLD AUTO: 0.03 10*3/MM3 (ref 0–0.05)
IMM GRANULOCYTES NFR BLD AUTO: 0.3 % (ref 0–0.5)
KETONES UR QL STRIP: NEGATIVE
LEUKOCYTE ESTERASE UR QL STRIP.AUTO: ABNORMAL
LYMPHOCYTES # BLD AUTO: 1.76 10*3/MM3 (ref 0.7–3.1)
LYMPHOCYTES NFR BLD AUTO: 18.5 % (ref 19.6–45.3)
MCH RBC QN AUTO: 29.1 PG (ref 26.6–33)
MCHC RBC AUTO-ENTMCNC: 34.2 G/DL (ref 31.5–35.7)
MCV RBC AUTO: 85.1 FL (ref 79–97)
MONOCYTES # BLD AUTO: 0.6 10*3/MM3 (ref 0.1–0.9)
MONOCYTES NFR BLD AUTO: 6.3 % (ref 5–12)
NEUTROPHILS # BLD AUTO: 6.99 10*3/MM3 (ref 1.7–7)
NEUTROPHILS NFR BLD AUTO: 73.6 % (ref 42.7–76)
NITRITE UR QL STRIP: NEGATIVE
NRBC BLD AUTO-RTO: 0 /100 WBC (ref 0–0.2)
PH UR STRIP.AUTO: 5.5 [PH] (ref 5–8)
PLATELET # BLD AUTO: 246 10*3/MM3 (ref 140–450)
PMV BLD AUTO: 10.9 FL (ref 6–12)
POTASSIUM BLD-SCNC: 3.6 MMOL/L (ref 3.5–5.2)
PROT SERPL-MCNC: 6.7 G/DL (ref 6–8.5)
PROT UR QL STRIP: NEGATIVE
RBC # BLD AUTO: 4.77 10*6/MM3 (ref 3.77–5.28)
RBC # UR: ABNORMAL /HPF
REF LAB TEST METHOD: ABNORMAL
SODIUM BLD-SCNC: 142 MMOL/L (ref 136–145)
SP GR UR STRIP: 1.01 (ref 1–1.03)
SQUAMOUS #/AREA URNS HPF: ABNORMAL /HPF
UROBILINOGEN UR QL STRIP: ABNORMAL
WBC NRBC COR # BLD: 9.5 10*3/MM3 (ref 3.4–10.8)
WBC UR QL AUTO: ABNORMAL /HPF

## 2020-01-22 PROCEDURE — 93005 ELECTROCARDIOGRAM TRACING: CPT | Performed by: PHYSICIAN ASSISTANT

## 2020-01-22 PROCEDURE — 81001 URINALYSIS AUTO W/SCOPE: CPT | Performed by: PHYSICIAN ASSISTANT

## 2020-01-22 PROCEDURE — 99284 EMERGENCY DEPT VISIT MOD MDM: CPT

## 2020-01-22 PROCEDURE — 93010 ELECTROCARDIOGRAM REPORT: CPT | Performed by: INTERNAL MEDICINE

## 2020-01-22 PROCEDURE — 70450 CT HEAD/BRAIN W/O DYE: CPT

## 2020-01-22 PROCEDURE — 85025 COMPLETE CBC W/AUTO DIFF WBC: CPT | Performed by: PHYSICIAN ASSISTANT

## 2020-01-22 PROCEDURE — 80053 COMPREHEN METABOLIC PANEL: CPT | Performed by: PHYSICIAN ASSISTANT

## 2020-01-22 NOTE — TELEPHONE ENCOUNTER
I have called the son Abeba Rodriguez to discuss what has been going on with the patient and to make sure he is aware

## 2020-01-27 RX ORDER — ERGOCALCIFEROL 1.25 MG/1
50000 CAPSULE ORAL WEEKLY
Qty: 12 CAPSULE | Refills: 1 | Status: SHIPPED | OUTPATIENT
Start: 2020-01-27 | End: 2020-06-24 | Stop reason: SDUPTHER

## 2020-02-07 ENCOUNTER — OFFICE VISIT (OUTPATIENT)
Dept: INTERNAL MEDICINE | Age: 75
End: 2020-02-07
Payer: MEDICARE

## 2020-02-07 ENCOUNTER — CARE COORDINATION (OUTPATIENT)
Dept: CARE COORDINATION | Age: 75
End: 2020-02-07

## 2020-02-07 VITALS
DIASTOLIC BLOOD PRESSURE: 84 MMHG | SYSTOLIC BLOOD PRESSURE: 138 MMHG | WEIGHT: 128.2 LBS | BODY MASS INDEX: 21.36 KG/M2 | HEART RATE: 96 BPM | HEIGHT: 65 IN | OXYGEN SATURATION: 98 %

## 2020-02-07 LAB — HBA1C MFR BLD: 5.6 %

## 2020-02-07 PROCEDURE — G8400 PT W/DXA NO RESULTS DOC: HCPCS | Performed by: INTERNAL MEDICINE

## 2020-02-07 PROCEDURE — 1123F ACP DISCUSS/DSCN MKR DOCD: CPT | Performed by: INTERNAL MEDICINE

## 2020-02-07 PROCEDURE — G8427 DOCREV CUR MEDS BY ELIG CLIN: HCPCS | Performed by: INTERNAL MEDICINE

## 2020-02-07 PROCEDURE — 99214 OFFICE O/P EST MOD 30 MIN: CPT | Performed by: INTERNAL MEDICINE

## 2020-02-07 PROCEDURE — G8420 CALC BMI NORM PARAMETERS: HCPCS | Performed by: INTERNAL MEDICINE

## 2020-02-07 PROCEDURE — 96372 THER/PROPH/DIAG INJ SC/IM: CPT | Performed by: INTERNAL MEDICINE

## 2020-02-07 PROCEDURE — 1090F PRES/ABSN URINE INCON ASSESS: CPT | Performed by: INTERNAL MEDICINE

## 2020-02-07 PROCEDURE — G8484 FLU IMMUNIZE NO ADMIN: HCPCS | Performed by: INTERNAL MEDICINE

## 2020-02-07 PROCEDURE — 1036F TOBACCO NON-USER: CPT | Performed by: INTERNAL MEDICINE

## 2020-02-07 PROCEDURE — 4040F PNEUMOC VAC/ADMIN/RCVD: CPT | Performed by: INTERNAL MEDICINE

## 2020-02-07 PROCEDURE — 83036 HEMOGLOBIN GLYCOSYLATED A1C: CPT | Performed by: INTERNAL MEDICINE

## 2020-02-07 RX ORDER — CYANOCOBALAMIN 1000 UG/ML
1000 INJECTION INTRAMUSCULAR; SUBCUTANEOUS ONCE
Status: COMPLETED | OUTPATIENT
Start: 2020-02-07 | End: 2020-02-07

## 2020-02-07 RX ADMIN — CYANOCOBALAMIN 1000 MCG: 1000 INJECTION INTRAMUSCULAR; SUBCUTANEOUS at 09:02

## 2020-02-07 ASSESSMENT — ENCOUNTER SYMPTOMS
CHEST TIGHTNESS: 0
ABDOMINAL PAIN: 0
WHEEZING: 0
CONSTIPATION: 0
SORE THROAT: 0
COUGH: 0

## 2020-02-07 NOTE — CARE COORDINATION
RUTHY MESSAGES with [facility], [address] [place] [address] for Dr Caitlin Guevara:    [address] [place] [address] -   Dr TaylorCaitlinmandy Guevara has a patient in office that she needs you to come see in office now if you could    Martin Galarza -    I am working in [place] today. Do you know what the need is? I may be able to walk you through it. Otherwise, please send me an in basket message. I will contact the patient on Monday. [address] [place] [address] -    Caitlin Guevara needs to have help with a patient with mental issues and she is older. Is there anyone here on campus     Eric Nunez look like Blake PARISI is in yet. Could Brito Kip help address immediate needs? CARLA -   shes not here    Martin Galarza -    Is Dr TaylorCaitlin Paola is fearful of letting the patient leave? CARLA -   The son is here from out of town and they are trying to get emercency guardianship she was just release from the hospital for this    [facility] [place] - Emergency Guardianship goes through the [employer]. Son needs to go to the his office to request guardianship. Stone Reid is working from home. If patient [facility] resident, tell the son he needs to go to [employer] office:  [address], [place]. Ph [phone]. CARLA -   Is this an attonery? Martin Galarza -   Yes, he is the [facility] [place] [facility]. His office can guide the patient's son through the Emergency Guardianship process.      Submitted by Inez/ELPIDIO

## 2020-02-07 NOTE — PROGRESS NOTES
Chief Complaint   Patient presents with    Follow-Up from Hospital     History of presenting illness:  Jass Araiza is a71 y.o. female who presents today for follow up on her chronic medical conditions as noted below.     50-year-old patient presents here today with her  and son  Her son has not been present to any of her appointments ever since I met her  Over last year Ms. Gogo Luna has had issues with declining memory/hallucinations that have been progressively worse  We have tried to contact son over last many months, however Ms. Gogo Luna never gave us the correct phone number therefore we have not been able to contact the son in past    This patient has increased issues with memory loss/late onset Alzheimer's disease  Recently she has had several ER visits  On 1/10/2020  On 1/22/2020-times was seen at [facility] emergency room    Patient essentially was diagnosed with same condition-no new findings  Final ER diagnosis late onset Alzheimer's with behavioral disturbance/hallucinations  Lab testing that was done at the emergency room included    urineanalysis is normal  CBC normal  CMP normal except elevated blood sugar 171/lab nonfasting  CT head essentially negative except cerebral and cerebellar volume loss with chronic microvascular disease  Is an MRI of the brain was done September 2019 no acute disease    Seen neurologist Dr. Laureano Head back in October 2019  Time Aricept was increased to 10 mg daily and plan was to add Namenda later  Follow-up appointment was suggested in 1 month    The level was done and it was 228  Seed B12 injection when she was here at office visit on 12/20/2019    Patient Active Problem List    Diagnosis Date Noted    Nail fungal infection 11/22/2019    Late onset Alzheimer's disease without behavioral disturbance ([facility] [address] 09/19/2019    Unspecified hearing loss, bilateral 06/04/2018    Tinnitus aurium, bilateral 06/04/2018    Vertigo, benign paroxysmal, right 06/04/2018    Benign paroxysmal positional vertigo due to bilateral vestibular disorder 05/15/2018    Allergic rhinitis 08/10/2017    Attention deficit disorder (ADD) without hyperactivity 08/10/2017    Auditory hallucinations 08/10/2017    Carotid atherosclerosis 08/10/2017    Cervicalgia 08/10/2017    Degeneration of cervical intervertebral disc 08/10/2017    Disc degeneration, lumbosacral 08/10/2017    Dizziness 08/10/2017    Dysuria 08/10/2017    Hypercalcemia 08/10/2017    Lactose intolerance 08/10/2017    Menopausal symptoms 08/10/2017    Microhematuria 08/10/2017    Palpitations 08/10/2017    Panic disorder without agoraphobia 08/10/2017    Paroxysmal hypertension 08/10/2017    Peripheral neuropathy 08/10/2017    Urine incontinence 08/10/2017    Pure hypercholesterolemia 08/05/2017    Generalized anxiety disorder 08/05/2017    Vitamin D deficiency 08/05/2017    Persistent insomnia 08/05/2017    Impaired fasting glucose 08/05/2017    Carotid artery stenosis 08/05/2017     Overview Note:     Rt carotid 50%; repeat 2014 internal ok/ external  At 50%      Osteopenia of both lower legs 08/05/2017     Overview Note:     12/15  Hip neck -1.9/ other nl      Fatigue 08/05/2017    Chronic vertigo 08/05/2017     Overview Note:     All eval in past has been negative      Nontoxic multinodular goiter 08/05/2017    OA (osteoarthritis) of knee 08/05/2017     Past Medical History:   Diagnosis Date    ADD (attention deficit disorder)     Carotid atherosclerosis     Degeneration of cervical intervertebral disc     Mixed hyperlipidemia     Nontoxic multinodular goiter     OA (osteoarthritis)     Osteopenia     Panic disorder without agoraphobia     Peripheral neuropathy     Pure hypercholesterolemia     Urinary incontinence     Vitamin D deficiency       Past Surgical History:   Procedure Laterality Date    COLONOSCOPY      HYSTERECTOMY, TOTAL ABDOMINAL      w/removal of both ovaries    JOINT REPLACEMENT      knee    TOTAL KNEE ARTHROPLASTY Right 2011    TOTAL KNEE ARTHROPLASTY Left 2012    VAGINA SURGERY      Abdomino-Vaginal Vesical Neck Suspension     Current Outpatient Medications   Medication Sig Dispense Refill    vitamin D (ERGOCALCIFEROL) 1.25 MG (14734 UT) CAPS capsule TAKE 1 CAPSULE BY MOUTH ONCE A WEEK 12 capsule 1    meclizine (ANTIVERT) 25 MG tablet TAKE 1 TABLET BY MOUTH THREE TIMES DAILY AS NEEDED DIZZINESS 30 tablet 1    colestipol (COLESTID) 1 g tablet Take 1 tablet by mouth daily 30 tablet 3    clotrimazole-betamethasone (LOTRISONE) 1-0.05 % cream Apply topically 2 times daily. 45 g 1    donepezil (ARICEPT) 10 MG tablet Take 1 tablet by mouth nightly 30 tablet 5    atorvastatin (LIPITOR) 20 MG tablet Take 1 tablet by mouth daily 90 tablet 1    tretinoin (RETIN-A) 0.1 % cream Apply topically nightly. 20 g 1    cetirizine (ZYRTEC) 10 MG tablet Take 10 mg by mouth daily      Ascorbic Acid (VITAMIN C) 500 MG tablet Take 500 mg by mouth daily      Coenzyme Q10 (CO Q-10) 200 MG CAPS Take 1 capsule by mouth daily       Chromium Picolinate 800 MCG TABS Take 1 tablet by mouth daily      Misc Natural Products (GINKOGIN PO) Take 50 mg by mouth daily       aspirin 81 MG tablet Take 81 mg by mouth daily       No current facility-administered medications for this visit.       Allergies   Allergen Reactions    Latex     Alcohol      liquid  liquid    Astelin [Azelastine Hcl]      solution  solution    Ciprofloxacin Hcl Swelling    Fluticasone Propionate      suspension  suspension    Hydroquinone      cream    Livalo [Pitavastatin]      tablets    Other      seafood  seafood    Vitamin B Complex [B-100]      Vit B 3 tablets  Vit B 3 tablets     Social History     Tobacco Use    Smoking status: Former Smoker     Packs/day: 2.50     Years: 5.00     Pack years: 12.50     Last attempt to quit: 1990     Years since quittin.1    Smokeless tobacco: Never Used   Substance Use Topics    Alcohol use: Not on file      Family History   Problem Relation Age of Onset    Cancer Mother         Hodgkins Lymphoma, AML    Breast Cancer Mother     Heart Disease Other        Review of Systems   Constitutional: Positive for fatigue. Negative for chills and fever. HENT: Negative for congestion, ear pain, nosebleeds, postnasal drip and sore throat. Respiratory: Negative for cough, chest tightness and wheezing. Cardiovascular: Negative for chest pain, palpitations and leg swelling. Gastrointestinal: Negative for abdominal pain and constipation. Genitourinary: Negative for dysuria and urgency. Musculoskeletal: Negative. Negative for arthralgias. Skin: Negative for rash. Neurological: Negative for dizziness and headaches. Psychiatric/Behavioral: Positive for behavioral problems and hallucinations. Vitals:    02/07/20 0827   BP: 138/84   Site: Right Upper Arm   Position: Sitting   Cuff Size: Medium Adult   Pulse: 96   SpO2: 98%   Weight: 128 lb 3.2 oz (58.2 kg)   Height: 5' 5\" (1.651 m)     Body mass index is 21.33 kg/m². Physical Exam  Constitutional:       Appearance: She is well-developed. HENT:      Right Ear: External ear normal.      Left Ear: External ear normal.      Mouth/Throat:      Pharynx: No oropharyngeal exudate. Eyes:      Conjunctiva/sclera: Conjunctivae normal.      Pupils: Pupils are equal, round, and reactive to light. Neck:      Musculoskeletal: Neck supple. Thyroid: No thyromegaly. Vascular: No JVD. Cardiovascular:      Rate and Rhythm: Normal rate. Heart sounds: Normal heart sounds. No murmur. Pulmonary:      Effort: No respiratory distress. Breath sounds: Normal breath sounds. No wheezing or rales. Chest:      Chest wall: No tenderness. Abdominal:      General: Bowel sounds are normal.      Palpations: Abdomen is soft. Lymphadenopathy:      Cervical: No cervical adenopathy. Skin:     General: Skin is warm. Findings: No rash. Neurological:      Mental Status: She is disoriented. Psychiatric:      Comments: With visual hallucinations         Lab Review   Orders Only on 12/20/2019   Component Date Value    T4 Free 12/20/2019 1.35     Vitamin B-12 12/20/2019 >2000*    TSH 12/20/2019 2.350            ASSESSMENT/PLAN:    Late onset Alzheimer's disease with behavioral disturbance (HCC)  Memory changes  Hallucination  Generalized anxiety disorder    Long discussion with the patient son  Ms. Constantino Matthews has had progressive memory issues over last few years, worse over last 8 to 10 months  This is the first time when we meet her son  She was referred last fall to see neurologist  She had one appointment in October and never returned for follow-up  She has been taking Aricept, dose of Aricept was increased to 10 mg with plans to add Namenda but patient never returned for follow-up so it was never started  I discussed with the son that patient needs to have 24/7 help/supervision  Her (who is not my patient) also seems significantly confused, unable to understand directions and clearly does not seem to be able to take care of his wife. However, I do not know his medical conditions so I will not be able to  it clearly. I suggest that patient has 24/7 help and supervision at home at all times  We will make referral for patient to see back neurology office for further recommendations    Impaired fasting glucose  -     POCT glycosylated hemoglobin (Hb A1C)    B12 deficiency  Borderline low B12 levels in November 2018  Patient started injections 2 months ago    B12 injection today  -     cyanocobalamin injection 1,000 mcg    IFG  a1c today is 5.6  No rx    Orders Placed This Encounter   Procedures    POCT glycosylated hemoglobin (Hb A1C)     New Prescriptions    No medications on file         No follow-ups on file. There are no Patient Instructions on file for this visit.   EMR Dragon/transcription

## 2020-02-13 ENCOUNTER — OFFICE VISIT (OUTPATIENT)
Dept: NEUROLOGY | Age: 75
End: 2020-02-13
Payer: MEDICARE

## 2020-02-13 VITALS
DIASTOLIC BLOOD PRESSURE: 78 MMHG | WEIGHT: 129 LBS | SYSTOLIC BLOOD PRESSURE: 118 MMHG | HEART RATE: 77 BPM | BODY MASS INDEX: 21.49 KG/M2 | HEIGHT: 65 IN

## 2020-02-13 PROCEDURE — 4040F PNEUMOC VAC/ADMIN/RCVD: CPT | Performed by: PSYCHIATRY & NEUROLOGY

## 2020-02-13 PROCEDURE — 99214 OFFICE O/P EST MOD 30 MIN: CPT | Performed by: PSYCHIATRY & NEUROLOGY

## 2020-02-13 PROCEDURE — G8400 PT W/DXA NO RESULTS DOC: HCPCS | Performed by: PSYCHIATRY & NEUROLOGY

## 2020-02-13 PROCEDURE — 1036F TOBACCO NON-USER: CPT | Performed by: PSYCHIATRY & NEUROLOGY

## 2020-02-13 PROCEDURE — G8484 FLU IMMUNIZE NO ADMIN: HCPCS | Performed by: PSYCHIATRY & NEUROLOGY

## 2020-02-13 PROCEDURE — G8427 DOCREV CUR MEDS BY ELIG CLIN: HCPCS | Performed by: PSYCHIATRY & NEUROLOGY

## 2020-02-13 PROCEDURE — G8420 CALC BMI NORM PARAMETERS: HCPCS | Performed by: PSYCHIATRY & NEUROLOGY

## 2020-02-13 PROCEDURE — 1123F ACP DISCUSS/DSCN MKR DOCD: CPT | Performed by: PSYCHIATRY & NEUROLOGY

## 2020-02-13 PROCEDURE — 1090F PRES/ABSN URINE INCON ASSESS: CPT | Performed by: PSYCHIATRY & NEUROLOGY

## 2020-02-13 RX ORDER — QUETIAPINE FUMARATE 25 MG/1
TABLET, FILM COATED ORAL
Qty: 60 TABLET | Refills: 2 | Status: SHIPPED | OUTPATIENT
Start: 2020-02-13 | End: 2020-07-21

## 2020-02-13 SDOH — HEALTH STABILITY: MENTAL HEALTH: HOW OFTEN DO YOU HAVE A DRINK CONTAINING ALCOHOL?: NEVER

## 2020-02-15 NOTE — PROGRESS NOTES
Chief Complaint   Patient presents with    Follow-up     Patient is a 4wk follow up for memory loss. Lorane Baumgarten is a 67y.o. year old female who is seen for evaluation of her poor memory. She has dementia. She has frequent hallucinations and delusional thinking. Unclear which came first.  No parkinsonism noted. She is here today with her son. She lives with her  who apparently has dementia as well. The patient has been having more sundowning lately. She was supposed to follow-up with me several months ago but they never made it. Her son helps care for them but he lives 3 hours away. She is currently on Aricept 10 mg a day. She has been to the ED several times since her last visit here because of behavioral difficulties. He had a long talk regarding all of the above. .  There is a family history of Alzheimer's disease. The patient had a normal MRI of the head at [facility] 9/19. Denies any focal signs or symptoms suggestive of stroke.     Active Ambulatory Problems     Diagnosis Date Noted    Pure hypercholesterolemia 08/05/2017    Generalized anxiety disorder 08/05/2017    Vitamin D deficiency 08/05/2017    Persistent insomnia 08/05/2017    Impaired fasting glucose 08/05/2017    Carotid artery stenosis 08/05/2017    Osteopenia of both lower legs 08/05/2017    Fatigue 08/05/2017    Chronic vertigo 08/05/2017    Nontoxic multinodular goiter 08/05/2017    OA (osteoarthritis) of knee 08/05/2017    Allergic rhinitis 08/10/2017    Attention deficit disorder (ADD) without hyperactivity 08/10/2017    Auditory hallucinations 08/10/2017    Carotid atherosclerosis 08/10/2017    Cervicalgia 08/10/2017    Degeneration of cervical intervertebral disc 08/10/2017    Disc degeneration, lumbosacral 08/10/2017    Dizziness 08/10/2017    Dysuria 08/10/2017    Hypercalcemia 08/10/2017    Lactose intolerance 08/10/2017    Menopausal symptoms 08/10/2017    Microhematuria 08/10/2017    Palpitations 08/10/2017    Panic disorder without agoraphobia 08/10/2017    Paroxysmal hypertension 08/10/2017    Peripheral neuropathy 08/10/2017    Urine incontinence 08/10/2017    Benign paroxysmal positional vertigo due to bilateral vestibular disorder 05/15/2018    Unspecified hearing loss, bilateral 06/04/2018    Tinnitus aurium, bilateral 06/04/2018    Vertigo, benign paroxysmal, right 06/04/2018    Late onset Alzheimer's disease without behavioral disturbance ([facility] [address] 09/19/2019    Nail fungal infection 11/22/2019     Resolved Ambulatory Problems     Diagnosis Date Noted    Special screening for malignant neoplasms, colon 08/05/2017    Hyperthyroidism 08/10/2017    Periorbital cellulitis of right eye 08/10/2017    Medicare annual wellness visit, subsequent 10/31/2017     Past Medical History:   Diagnosis Date    ADD (attention deficit disorder)     Mixed hyperlipidemia     OA (osteoarthritis)     Osteopenia     Urinary incontinence        Past Surgical History:   Procedure Laterality Date    COLONOSCOPY      HYSTERECTOMY, TOTAL ABDOMINAL      w/removal of both ovaries    JOINT REPLACEMENT      knee    TOTAL KNEE ARTHROPLASTY Right 2011    TOTAL KNEE ARTHROPLASTY Left 2012    VAGINA SURGERY      Abdomino-Vaginal Vesical Neck Suspension       Family History   Problem Relation Age of Onset    Cancer Mother         Hodgkins Lymphoma, AML    Breast Cancer Mother     Heart Disease Other        Allergies   Allergen Reactions    Latex     Alcohol      liquid  liquid    Astelin [Azelastine Hcl]      solution  solution    Ciprofloxacin Hcl Swelling    Fluticasone Propionate      suspension  suspension    Hydroquinone      cream    Livalo [Pitavastatin]      tablets    Other      seafood  seafood    Vitamin B Complex [B-100]      Vit B 3 tablets  Vit B 3 tablets       Social History     Socioeconomic History    Marital status:      Spouse name: Not on file    []?Diarrhea []? Constipation []? Nausea  []? Vomiting  [x]? Denies all unless marked  Genitourinary:  []? Dysuria []? Frequency  []? Incontinence []? Urgency   [x]? Denies all unless marked  Musculoskeletal: []? Arthralgia  []? Myalgias []? Muscle cramps  []? Muscle twitches   [x]? Denies all unless marked   Extremities:   []? Pain   []? Swelling   [x]? Denies all unless marked  Skin:[]? Rash  []? Color Change  [x]? Denies all unless marked  Neurological:[]? Visual Disturbance []? Double Vision []? Slurred Speech []? Trouble swallowing  []? Vertigo []? Tingling []? Numbness []? Weakness []? Loss of Balance   []? Loss of Consciousness [x]? Memory Loss []? Seizures  []? Denies all unless marked  Psychiatric/Behavioral:[]? Depression []? Anxiety  [x]? Denies all unless marked  Sleep: []? Insomnia []? Sleep Disturbance []? Snoring []? Restless Legs []? Daytime Sleepiness []? Sleep Apnea  [x]? Denies all unless marked               Current Outpatient Medications   Medication Sig Dispense Refill    QUEtiapine (SEROQUEL) 25 MG tablet Take 1-2 each evening 60 tablet 2    vitamin D (ERGOCALCIFEROL) 1.25 MG (66931 UT) CAPS capsule TAKE 1 CAPSULE BY MOUTH ONCE A WEEK 12 capsule 1    meclizine (ANTIVERT) 25 MG tablet TAKE 1 TABLET BY MOUTH THREE TIMES DAILY AS NEEDED DIZZINESS 30 tablet 1    colestipol (COLESTID) 1 g tablet Take 1 tablet by mouth daily 30 tablet 3    clotrimazole-betamethasone (LOTRISONE) 1-0.05 % cream Apply topically 2 times daily. 45 g 1    donepezil (ARICEPT) 10 MG tablet Take 1 tablet by mouth nightly 30 tablet 5    atorvastatin (LIPITOR) 20 MG tablet Take 1 tablet by mouth daily 90 tablet 1    tretinoin (RETIN-A) 0.1 % cream Apply topically nightly.  20 g 1    cetirizine (ZYRTEC) 10 MG tablet Take 10 mg by mouth daily      Ascorbic Acid (VITAMIN C) 500 MG tablet Take 500 mg by mouth daily      Coenzyme Q10 (CO Q-10) 200 MG CAPS Take 1 capsule by mouth daily       Chromium Picolinate 800 MCG TABS Take 1 tablet by mouth daily      Misc Natural Products (GINKOGIN PO) Take 50 mg by mouth daily       aspirin 81 MG tablet Take 81 mg by mouth daily       No current facility-administered medications for this visit. Outpatient Medications Marked as Taking for the 2/13/20 encounter (Office Visit) with Iftikhar Ogden MD   Medication Sig Dispense Refill    QUEtiapine (SEROQUEL) 25 MG tablet Take 1-2 each evening 60 tablet 2    vitamin D (ERGOCALCIFEROL) 1.25 MG (36809 UT) CAPS capsule TAKE 1 CAPSULE BY MOUTH ONCE A WEEK 12 capsule 1    meclizine (ANTIVERT) 25 MG tablet TAKE 1 TABLET BY MOUTH THREE TIMES DAILY AS NEEDED DIZZINESS 30 tablet 1    colestipol (COLESTID) 1 g tablet Take 1 tablet by mouth daily 30 tablet 3    clotrimazole-betamethasone (LOTRISONE) 1-0.05 % cream Apply topically 2 times daily. 45 g 1    donepezil (ARICEPT) 10 MG tablet Take 1 tablet by mouth nightly 30 tablet 5    atorvastatin (LIPITOR) 20 MG tablet Take 1 tablet by mouth daily 90 tablet 1    tretinoin (RETIN-A) 0.1 % cream Apply topically nightly. 20 g 1    cetirizine (ZYRTEC) 10 MG tablet Take 10 mg by mouth daily      Ascorbic Acid (VITAMIN C) 500 MG tablet Take 500 mg by mouth daily      Coenzyme Q10 (CO Q-10) 200 MG CAPS Take 1 capsule by mouth daily       Chromium Picolinate 800 MCG TABS Take 1 tablet by mouth daily      Misc Natural Products (GINKOGIN PO) Take 50 mg by mouth daily       aspirin 81 MG tablet Take 81 mg by mouth daily         /78   Pulse 77   Ht 5' 5\" (1.651 m)   Wt 129 lb (58.5 kg)   BMI 21.47 kg/m²       Constitutional - well developed, well nourished. Eyes - conjunctiva normal.  Pupils react to light  Ear, nose, throat -hearing intact to finger rub No scars, masses, or lesions over external nose or ears, no atrophy of tongue  Neck-symmetric, no masses noted, no jugular vein distension. No bruits noted.   Respiration- chest wall appears symmetric, good expansion,   normal effort

## 2020-03-19 ENCOUNTER — OFFICE VISIT (OUTPATIENT)
Dept: INTERNAL MEDICINE | Age: 75
End: 2020-03-19
Payer: MEDICARE

## 2020-03-19 VITALS
HEART RATE: 63 BPM | OXYGEN SATURATION: 97 % | BODY MASS INDEX: 21.66 KG/M2 | SYSTOLIC BLOOD PRESSURE: 120 MMHG | WEIGHT: 130 LBS | DIASTOLIC BLOOD PRESSURE: 82 MMHG | HEIGHT: 65 IN

## 2020-03-19 DIAGNOSIS — R19.7 DIARRHEA, UNSPECIFIED TYPE: ICD-10-CM

## 2020-03-19 DIAGNOSIS — R73.01 IMPAIRED FASTING GLUCOSE: ICD-10-CM

## 2020-03-19 DIAGNOSIS — R63.4 WEIGHT LOSS: ICD-10-CM

## 2020-03-19 DIAGNOSIS — E05.90 HYPERTHYROIDISM: ICD-10-CM

## 2020-03-19 DIAGNOSIS — E53.8 B12 DEFICIENCY: ICD-10-CM

## 2020-03-19 DIAGNOSIS — R41.3 MEMORY CHANGES: ICD-10-CM

## 2020-03-19 DIAGNOSIS — E55.9 VITAMIN D DEFICIENCY: ICD-10-CM

## 2020-03-19 DIAGNOSIS — F41.1 GENERALIZED ANXIETY DISORDER: ICD-10-CM

## 2020-03-19 LAB
ALBUMIN SERPL-MCNC: 4.6 G/DL (ref 3.5–5.2)
ALP BLD-CCNC: 101 U/L (ref 35–104)
ALT SERPL-CCNC: 11 U/L (ref 5–33)
ANION GAP SERPL CALCULATED.3IONS-SCNC: 17 MMOL/L (ref 7–19)
AST SERPL-CCNC: 12 U/L (ref 5–32)
BACTERIA: ABNORMAL /HPF
BASOPHILS ABSOLUTE: 0.1 K/UL (ref 0–0.2)
BASOPHILS RELATIVE PERCENT: 1.1 % (ref 0–1)
BILIRUB SERPL-MCNC: 0.6 MG/DL (ref 0.2–1.2)
BILIRUBIN URINE: NEGATIVE
BLOOD, URINE: ABNORMAL
BUN BLDV-MCNC: 14 MG/DL (ref 8–23)
CALCIUM SERPL-MCNC: 10 MG/DL (ref 8.8–10.2)
CHLORIDE BLD-SCNC: 106 MMOL/L (ref 98–111)
CHOLESTEROL, TOTAL: 203 MG/DL (ref 160–199)
CLARITY: ABNORMAL
CO2: 23 MMOL/L (ref 22–29)
COLOR: YELLOW
CREAT SERPL-MCNC: 0.6 MG/DL (ref 0.5–0.9)
EOSINOPHILS ABSOLUTE: 0.1 K/UL (ref 0–0.6)
EOSINOPHILS RELATIVE PERCENT: 0.9 % (ref 0–5)
EPITHELIAL CELLS, UA: ABNORMAL /HPF
GFR NON-AFRICAN AMERICAN: >60
GLUCOSE BLD-MCNC: 98 MG/DL (ref 74–109)
GLUCOSE URINE: NEGATIVE MG/DL
HBA1C MFR BLD: 5.5 % (ref 4–6)
HCT VFR BLD CALC: 44.5 % (ref 37–47)
HDLC SERPL-MCNC: 63 MG/DL (ref 65–121)
HEMOGLOBIN: 14.2 G/DL (ref 12–16)
IMMATURE GRANULOCYTES #: 0 K/UL
KETONES, URINE: NEGATIVE MG/DL
LDL CHOLESTEROL CALCULATED: 127 MG/DL
LEUKOCYTE ESTERASE, URINE: NEGATIVE
LYMPHOCYTES ABSOLUTE: 2 K/UL (ref 1.1–4.5)
LYMPHOCYTES RELATIVE PERCENT: 29.7 % (ref 20–40)
MCH RBC QN AUTO: 28.6 PG (ref 27–31)
MCHC RBC AUTO-ENTMCNC: 31.9 G/DL (ref 33–37)
MCV RBC AUTO: 89.7 FL (ref 81–99)
MONOCYTES ABSOLUTE: 0.5 K/UL (ref 0–0.9)
MONOCYTES RELATIVE PERCENT: 7.6 % (ref 0–10)
NEUTROPHILS ABSOLUTE: 4 K/UL (ref 1.5–7.5)
NEUTROPHILS RELATIVE PERCENT: 60.4 % (ref 50–65)
NITRITE, URINE: NEGATIVE
PDW BLD-RTO: 12.8 % (ref 11.5–14.5)
PH UA: 5.5 (ref 5–8)
PLATELET # BLD: 219 K/UL (ref 130–400)
PMV BLD AUTO: 11.6 FL (ref 9.4–12.3)
POTASSIUM SERPL-SCNC: 4.3 MMOL/L (ref 3.5–5)
PROTEIN UA: NEGATIVE MG/DL
RBC # BLD: 4.96 M/UL (ref 4.2–5.4)
RBC UA: ABNORMAL /HPF (ref 0–2)
SODIUM BLD-SCNC: 146 MMOL/L (ref 136–145)
SPECIFIC GRAVITY UA: 1.03 (ref 1–1.03)
TOTAL PROTEIN: 6.7 G/DL (ref 6.6–8.7)
TRIGL SERPL-MCNC: 65 MG/DL (ref 0–149)
TSH SERPL DL<=0.05 MIU/L-ACNC: 2.09 UIU/ML (ref 0.27–4.2)
UROBILINOGEN, URINE: 0.2 E.U./DL
VITAMIN B-12: 292 PG/ML (ref 211–946)
VITAMIN D 25-HYDROXY: 37.5 NG/ML
WBC # BLD: 6.6 K/UL (ref 4.8–10.8)
WBC UA: ABNORMAL /HPF (ref 0–5)

## 2020-03-19 PROCEDURE — 99214 OFFICE O/P EST MOD 30 MIN: CPT | Performed by: INTERNAL MEDICINE

## 2020-03-19 PROCEDURE — 4040F PNEUMOC VAC/ADMIN/RCVD: CPT | Performed by: INTERNAL MEDICINE

## 2020-03-19 PROCEDURE — 1123F ACP DISCUSS/DSCN MKR DOCD: CPT | Performed by: INTERNAL MEDICINE

## 2020-03-19 PROCEDURE — G8400 PT W/DXA NO RESULTS DOC: HCPCS | Performed by: INTERNAL MEDICINE

## 2020-03-19 PROCEDURE — G8427 DOCREV CUR MEDS BY ELIG CLIN: HCPCS | Performed by: INTERNAL MEDICINE

## 2020-03-19 PROCEDURE — G8484 FLU IMMUNIZE NO ADMIN: HCPCS | Performed by: INTERNAL MEDICINE

## 2020-03-19 PROCEDURE — 1036F TOBACCO NON-USER: CPT | Performed by: INTERNAL MEDICINE

## 2020-03-19 PROCEDURE — G0439 PPPS, SUBSEQ VISIT: HCPCS | Performed by: INTERNAL MEDICINE

## 2020-03-19 PROCEDURE — G8420 CALC BMI NORM PARAMETERS: HCPCS | Performed by: INTERNAL MEDICINE

## 2020-03-19 PROCEDURE — 1090F PRES/ABSN URINE INCON ASSESS: CPT | Performed by: INTERNAL MEDICINE

## 2020-03-19 ASSESSMENT — PATIENT HEALTH QUESTIONNAIRE - PHQ9
SUM OF ALL RESPONSES TO PHQ QUESTIONS 1-9: 0
SUM OF ALL RESPONSES TO PHQ QUESTIONS 1-9: 0

## 2020-03-19 ASSESSMENT — ENCOUNTER SYMPTOMS
VOICE CHANGE: 0
BLOOD IN STOOL: 0
COUGH: 0
CHEST TIGHTNESS: 0
CONSTIPATION: 0
TROUBLE SWALLOWING: 0
WHEEZING: 0
ABDOMINAL PAIN: 0
SINUS PRESSURE: 0
EYE PAIN: 0
VOMITING: 0
DIARRHEA: 0
NAUSEA: 0
EYE REDNESS: 0
COLOR CHANGE: 0
SORE THROAT: 0

## 2020-03-19 ASSESSMENT — LIFESTYLE VARIABLES: HOW OFTEN DO YOU HAVE A DRINK CONTAINING ALCOHOL: 0

## 2020-03-19 NOTE — PROGRESS NOTES
Chief Complaint:   Jesus Urias is a 67 y.o. female who presents forcomplete physical exam.    History of Present Illness:      Patient is here for  [address]:  Dr Yoly Serrano  Neurology  Dr Obrien Re- ophthalmology    PT ABLE  Formerly [facility]   NO COMPLAINTS ABOUT HEARING DEFICIT  BEHAVIORAL OR PSYCHOSOCIAL RISKS [employer] DEFICIT DETECTED++ dx dementia    _____________________________________________________________________    Pt presents today for follow-up and management of following chronic medicalconditions:    Late onset Alzheimer's disease with behavioral disturbance ([facility] [address]  Memory changes  Hallucination  Generalized anxiety disorder      Pure hypercholesterolemia-she has tried to stay on diet, started lipitor 20 daily 3 mo ago     Impaired fasting glucose-has tried to stay on diet and avoid concentrated sweets     Vitamin D deficiency-she takes vitamin D supplement 50,000 IU weekly     Persistent insomnia-she takes Sonata at bedtime to help her sleep occasionalyy / does not need refill today     Generalized anxiety disorder- she has occasional panic attacks, takes occasional Ativan tablet when necessary only     Patient Active Problem List    Diagnosis Date Noted    Nail fungal infection 11/22/2019    Late onset Alzheimer's disease without behavioral disturbance ([facility] [address] 09/19/2019    Unspecified hearing loss, bilateral 06/04/2018    Tinnitus aurium, bilateral 06/04/2018    Vertigo, benign paroxysmal, right 06/04/2018    Benign paroxysmal positional vertigo due to bilateral vestibular disorder 05/15/2018    Allergic rhinitis 08/10/2017    Attention deficit disorder (ADD) without hyperactivity 08/10/2017    Auditory hallucinations 08/10/2017    Carotid atherosclerosis 08/10/2017    Cervicalgia 08/10/2017    Degeneration of cervical intervertebral disc 08/10/2017    Disc degeneration, lumbosacral 08/10/2017    Dizziness 08/10/2017    Dysuria 08/10/2017    Hypercalcemia 08/10/2017    Lactose intolerance 08/10/2017    Menopausal symptoms 08/10/2017    Microhematuria 08/10/2017    Palpitations 08/10/2017    Panic disorder without agoraphobia 08/10/2017    Paroxysmal hypertension 08/10/2017    Peripheral neuropathy 08/10/2017    Urine incontinence 08/10/2017    Pure hypercholesterolemia 08/05/2017    Generalized anxiety disorder 08/05/2017    Vitamin D deficiency 08/05/2017    Persistent insomnia 08/05/2017    Impaired fasting glucose 08/05/2017    Carotid artery stenosis 08/05/2017     Rt carotid 50%; repeat 2014 internal ok/ external  At 50%      Osteopenia of both lower legs 08/05/2017     12/15  Hip neck -1.9/ other nl      Fatigue 08/05/2017    Chronic vertigo 08/05/2017     All eval in past has been negative      Nontoxic multinodular goiter 08/05/2017    OA (osteoarthritis) of knee 08/05/2017       Past Medical History:   Diagnosis Date    ADD (attention deficit disorder)     Carotid atherosclerosis     Degeneration of cervical intervertebral disc     Mixed hyperlipidemia     Nontoxic multinodular goiter     OA (osteoarthritis)     Osteopenia     Panic disorder without agoraphobia     Peripheral neuropathy     Pure hypercholesterolemia     Urinary incontinence     Vitamin D deficiency        Past Surgical History:   Procedure Laterality Date    COLONOSCOPY      HYSTERECTOMY, TOTAL ABDOMINAL      w/removal of both ovaries    JOINT REPLACEMENT      knee    TOTAL KNEE ARTHROPLASTY Right 2011    TOTAL KNEE ARTHROPLASTY Left 2012    VAGINA SURGERY      Abdomino-Vaginal Vesical Neck Suspension       Current Outpatient Medications   Medication Sig Dispense Refill    donepezil (ARICEPT) 10 MG tablet TAKE 1 TABLET BY MOUTH ONCE DAILY AT NIGHT 30 tablet 5    QUEtiapine (SEROQUEL) 25 MG tablet Take 1-2 each evening 60 tablet 2    vitamin D no prescription is needed  Monitor.     Impaired fasting glucose-  her hemoglobin A1c 5.5 (5.7)(5.6)( 6.0) (  5.7), diet lower in free sugars is recommended     Vitamin D deficiency-her vitamin D level is 37 (57)(46)( 51)-   Continue vitamin D supplement 50,000 IU weekly     Generalized anxiety disorder  Takes prn ativan  Does  not need refills today     Pure hypercholesterolemia-  Her LDL  127 (95) (158)( 164) improved since started lipitor  Cont same lipitor 20 daily     Supressed TSh previously  Now nl range    B 12 deficiency  Level dropped again 292  Needs to start b12 injections     Orders Placed This Encounter   Procedures    Comprehensive Metabolic Panel    Vitamin D 25 Hydroxy    Vitamin B12     New Prescriptions    No medications on file        Return in about 3 months (around 6/19/2020) for Medication check. There are no Patient Instructions on file for this visit. Orders Placed This Encounter   Procedures    Comprehensive Metabolic Panel     Standing Status:   Future     Standing Expiration Date:   3/19/2021    Vitamin D 25 Hydroxy     Standing Status:   Future     Standing Expiration Date:   3/19/2021    Vitamin B12     Standing Status:   Future     Standing Expiration Date:   3/19/2021         EMR Dragon/transcriptiondisclaimer:Significant part of this  encounter note is electronic transcription/translation of spoken language to printed text. The electronic translation of spoken language may be erroneous, or at times, nonsensical wordsor phrases may be inadvertently transcribed.  Although I have reviewed the note for such errors, some may still exist.

## 2020-03-20 RX ORDER — DONEPEZIL HYDROCHLORIDE 5 MG/1
TABLET, FILM COATED ORAL
Qty: 30 TABLET | Refills: 0 | OUTPATIENT
Start: 2020-03-20

## 2020-03-20 RX ORDER — ATORVASTATIN CALCIUM 20 MG/1
TABLET, FILM COATED ORAL
Qty: 90 TABLET | Refills: 0 | Status: SHIPPED | OUTPATIENT
Start: 2020-03-20 | End: 2020-06-23 | Stop reason: SDUPTHER

## 2020-03-20 NOTE — TELEPHONE ENCOUNTER
Last Appointment Date: 3/19/2020  Next Appointment Date: 6/23/2020    Allergies   Allergen Reactions    Latex     Alcohol      liquid  liquid    Astelin [Azelastine Hcl]      solution  solution    Ciprofloxacin Hcl Swelling    Fluticasone Propionate      suspension  suspension    Hydroquinone      cream    Livalo [Pitavastatin]      tablets    Other      seafood  seafood    Vitamin B Complex [B-100]      Vit B 3 tablets  Vit B 3 tablets     Pt gets the Aricept from Dr. Latisha Huang

## 2020-03-24 ENCOUNTER — TELEPHONE (OUTPATIENT)
Dept: NEUROLOGY | Age: 75
End: 2020-03-24

## 2020-04-27 RX ORDER — MECLIZINE HYDROCHLORIDE 25 MG/1
TABLET ORAL
Qty: 30 TABLET | Refills: 0 | Status: SHIPPED | OUTPATIENT
Start: 2020-04-27 | End: 2020-05-11

## 2020-04-27 NOTE — TELEPHONE ENCOUNTER
Rolf called requesting a refill of the below medication which has been pended for you:     Requested Prescriptions     Pending Prescriptions Disp Refills    meclizine (ANTIVERT) 25 MG tablet [Pharmacy Med Name: Meclizine HCl 25 MG Oral Tablet] 30 tablet 0     Sig: TAKE 1 TABLET BY MOUTH THREE TIMES DAILY AS NEEDED DIZZINESS       Last Appointment Date: 3/19/2020  Next Appointment Date: 6/23/2020    Allergies   Allergen Reactions    Latex     Alcohol      liquid  liquid    Astelin [Azelastine Hcl]      solution  solution    Ciprofloxacin Hcl Swelling    Fluticasone Propionate      suspension  suspension    Hydroquinone      cream    Livalo [Pitavastatin]      tablets    Other      seafood  seafood    Vitamin B Complex [B-100]      Vit B 3 tablets  Vit B 3 tablets

## 2020-05-05 ENCOUNTER — TELEPHONE (OUTPATIENT)
Dept: NEUROLOGY | Age: 75
End: 2020-05-05

## 2020-05-07 ENCOUNTER — TELEMEDICINE (OUTPATIENT)
Dept: NEUROLOGY | Age: 75
End: 2020-05-07
Payer: MEDICARE

## 2020-05-07 ENCOUNTER — TELEPHONE (OUTPATIENT)
Dept: NEUROLOGY | Age: 75
End: 2020-05-07

## 2020-05-07 PROCEDURE — 99213 OFFICE O/P EST LOW 20 MIN: CPT | Performed by: PSYCHIATRY & NEUROLOGY

## 2020-05-07 PROCEDURE — G8427 DOCREV CUR MEDS BY ELIG CLIN: HCPCS | Performed by: PSYCHIATRY & NEUROLOGY

## 2020-05-07 PROCEDURE — 4040F PNEUMOC VAC/ADMIN/RCVD: CPT | Performed by: PSYCHIATRY & NEUROLOGY

## 2020-05-07 PROCEDURE — G8400 PT W/DXA NO RESULTS DOC: HCPCS | Performed by: PSYCHIATRY & NEUROLOGY

## 2020-05-07 PROCEDURE — 1123F ACP DISCUSS/DSCN MKR DOCD: CPT | Performed by: PSYCHIATRY & NEUROLOGY

## 2020-05-07 PROCEDURE — 1090F PRES/ABSN URINE INCON ASSESS: CPT | Performed by: PSYCHIATRY & NEUROLOGY

## 2020-05-07 NOTE — PROGRESS NOTES
REVIEW OF SYSTEMS    Constitutional: []Fever []Sweats []Chills [] Recent Injury   [x] Denies all unless marked  HENT:[]Headache  [] Head Injury  [] Sore Throat  [] Ear Pain  [] Dizziness [] Hearing Loss   [x] Denies all unless marked  Musculoskeletal: [] Arthralgia  [] Myalgias [] Muscle cramps  [] Muscle twitches   [x] Denies all unless marked   Spine:  [] Neck pain  [] Back pain  [] Sciaticia  [x] Denies all unless marked  Neurological:[] Visual Disturbance [] Double Vision [] Slurred Speech [] Trouble swallowing  [] Vertigo [] Tingling [] Numbness [] Weakness [] Loss of Balance   [] Loss of Consciousness [x] Memory Loss [] Seizures  [x] Denies all unless marked  Psychiatric/Behavioral:[] Depression [] Anxiety  [x] Denies all unless marked  Sleep: []  Insomnia [] Sleep Disturbance [] Snoring [] Restless Legs [] Daytime Sleepiness [] Sleep Apnea  [x] Denies all unless marked

## 2020-05-07 NOTE — TELEPHONE ENCOUNTER
[facility] called and needed clarification on the Rx Namenda. When I called back I spoke with Jocy Wilkins and voiced that Dr. Taryn Harrison wanted the pt to be on each strength for 1 week to titrate up slowly and then the 28mg will be the maintenance dose. Jocy Wilkins voiced understanding.

## 2020-05-07 NOTE — PROGRESS NOTES
marked   HENT:?Headache ? Head Injury ? Sore Throat ? Ear Pain ? Dizziness ? Hearing Loss ? Denies all unless marked   Musculoskeletal: ? Arthralgia ? Myalgias ? Muscle cramps ? Muscle twitches   ? Denies all unless marked   Spine: ? Neck pain ? Back pain ? Sciaticia ? Denies all unless marked   Neurological:? Visual Disturbance ? Double Vision ? Slurred Speech ? Trouble swallowing ? Vertigo ? Tingling ? Numbness ? Weakness ? Loss of Balance   ? Loss of Consciousness ? Memory Loss ? Seizures ? Denies all unless marked   Psychiatric/Behavioral:? Depression ? Anxiety ? Denies all unless marked   Sleep: ? Insomnia ? Sleep Disturbance ? Snoring ? Restless Legs ? Daytime Sleepiness ? Sleep Apnea ?  Denies all unless marked     Past Medical History:   Diagnosis Date    ADD (attention deficit disorder)     Carotid atherosclerosis     Degeneration of cervical intervertebral disc     Mixed hyperlipidemia     Nontoxic multinodular goiter     OA (osteoarthritis)     Osteopenia     Panic disorder without agoraphobia     Peripheral neuropathy     Pure hypercholesterolemia     Urinary incontinence     Vitamin D deficiency        Past Surgical History:   Procedure Laterality Date    COLONOSCOPY      HYSTERECTOMY, TOTAL ABDOMINAL      w/removal of both ovaries    JOINT REPLACEMENT      knee    TOTAL KNEE ARTHROPLASTY Right 2011    TOTAL KNEE ARTHROPLASTY Left 2012    VAGINA SURGERY      Abdomino-Vaginal Vesical Neck Suspension       Family History   Problem Relation Age of Onset    Cancer Mother         Hodgkins Lymphoma, AML    Breast Cancer Mother     Heart Disease Other        Social History     Socioeconomic History    Marital status:      Spouse name: Not on file    Number of children: Not on file    Years of education: Not on file    Highest education level: Not on file   Occupational History    Not on file   Social Needs    Financial resource strain: Not on file    Food insecurity     Worry: Not on file     Inability: Not on file    Transportation needs     Medical: Not on file     Non-medical: Not on file   Tobacco Use    Smoking status: Former Smoker     Packs/day: 2.50     Years: 5.00     Pack years: 12.50     Last attempt to quit:      Years since quittin.3    Smokeless tobacco: Never Used   Substance and Sexual Activity    Alcohol use: Never     Frequency: Never    Drug use: Never    Sexual activity: Not on file   Lifestyle    Physical activity     Days per week: Not on file     Minutes per session: Not on file    Stress: Not on file   Relationships    Social connections     Talks on phone: Not on file     Gets together: Not on file     Attends Scientologist service: Not on file     Active member of club or organization: Not on file     Attends meetings of clubs or organizations: Not on file     Relationship status: Not on file    Intimate partner violence     Fear of current or ex partner: Not on file     Emotionally abused: Not on file     Physically abused: Not on file     Forced sexual activity: Not on file   Other Topics Concern    Not on file   Social History Narrative    Not on file       Current Outpatient Medications   Medication Sig Dispense Refill    Memantine HCl ER 7 & 14 & 21 &28 MG CP24 Take 1 capsule by mouth daily (with breakfast) 28 capsule 0    meclizine (ANTIVERT) 25 MG tablet TAKE 1 TABLET BY MOUTH THREE TIMES DAILY AS NEEDED DIZZINESS 30 tablet 0    atorvastatin (LIPITOR) 20 MG tablet Take 1 tablet by mouth once daily 90 tablet 0    donepezil (ARICEPT) 10 MG tablet TAKE 1 TABLET BY MOUTH ONCE DAILY AT NIGHT 30 tablet 5    QUEtiapine (SEROQUEL) 25 MG tablet Take 1-2 each evening 60 tablet 2    vitamin D (ERGOCALCIFEROL) 1.25 MG (25219 UT) CAPS capsule TAKE 1 CAPSULE BY MOUTH ONCE A WEEK 12 capsule 1    colestipol (COLESTID) 1 g tablet Take 1 tablet by mouth daily 30 tablet 3    clotrimazole-betamethasone (LOTRISONE) 1-0.05 % cream Apply topically 2 times daily. 45 g 1    tretinoin (RETIN-A) 0.1 % cream Apply topically nightly. 20 g 1    cetirizine (ZYRTEC) 10 MG tablet Take 10 mg by mouth daily      Ascorbic Acid (VITAMIN C) 500 MG tablet Take 500 mg by mouth daily      Coenzyme Q10 (CO Q-10) 200 MG CAPS Take 1 capsule by mouth daily       Chromium Picolinate 800 MCG TABS Take 1 tablet by mouth daily      Misc Natural Products (GINKOGIN PO) Take 50 mg by mouth daily       aspirin 81 MG tablet Take 81 mg by mouth daily       No current facility-administered medications for this visit. Allergies   Allergen Reactions    Latex     Alcohol      liquid  liquid    Astelin [Azelastine Hcl]      solution  solution    Ciprofloxacin Hcl Swelling    Fluticasone Propionate      suspension  suspension    Hydroquinone      cream    Livalo [Pitavastatin]      tablets    Other      seafood  seafood    Vitamin B Complex [B-100]      Vit B 3 tablets  Vit B 3 tablets           PHYSICAL EXAMINATION:    Constitutional -   General appearance: Alert in no acute distress   EYES -   Conjunctiva appears normal  ENT-    Hearing intact, no scars, masses, or lesions over external nose on visible skin  Pulmonary-   Breathing appears normal, good expansion, and normal effort without use of accessory muscles  Musculoskeletal -   No gross bony deformities  Gait as below, see gait exam in the neurologic exam  Skin -   No rash, erythema, or pallor on visible skin  Psychiatric -   Mood, affect, and behavior appear normal    Memory as below see mental status examination in the neurologic exam    NEUROLOGICAL EXAM    Mental status   [x]Awake, alert, she could not tell me the month nor what is going on in the world with the pandemic. [x]Affect attention and concentration appear appropriate  [x]Recent and remote memory appears unremarkable  [x]Speech normal without dysarthria or aphasia, comprehension and repetition intact.    COMMENTS:   Cranial Nerves [x] PER, EOMI, no

## 2020-05-11 RX ORDER — MECLIZINE HYDROCHLORIDE 25 MG/1
TABLET ORAL
Qty: 30 TABLET | Refills: 0 | Status: SHIPPED | OUTPATIENT
Start: 2020-05-11 | End: 2020-09-25 | Stop reason: SDUPTHER

## 2020-06-16 ENCOUNTER — TELEPHONE (OUTPATIENT)
Dept: NEUROLOGY | Age: 75
End: 2020-06-16

## 2020-06-16 NOTE — TELEPHONE ENCOUNTER
Patient son Minna Meadows left message stating that he believes Jos Rodrigues is causing his mother to have more hallucinations and he wants to know if another medication can be added or if it should be changed. Pt was started on Namenda on 5/7/20.  Please advise

## 2020-06-17 NOTE — TELEPHONE ENCOUNTER
Spoke with pt son and voiced that the pt could d/c the medication. He has decided to leave her on namenda at this time.

## 2020-06-23 ENCOUNTER — OFFICE VISIT (OUTPATIENT)
Dept: INTERNAL MEDICINE | Age: 75
End: 2020-06-23
Payer: MEDICARE

## 2020-06-23 VITALS
OXYGEN SATURATION: 97 % | BODY MASS INDEX: 21.38 KG/M2 | WEIGHT: 133 LBS | HEART RATE: 92 BPM | SYSTOLIC BLOOD PRESSURE: 138 MMHG | DIASTOLIC BLOOD PRESSURE: 78 MMHG | HEIGHT: 66 IN

## 2020-06-23 DIAGNOSIS — E55.9 VITAMIN D DEFICIENCY: ICD-10-CM

## 2020-06-23 DIAGNOSIS — F41.1 GENERALIZED ANXIETY DISORDER: ICD-10-CM

## 2020-06-23 DIAGNOSIS — M85.862 OSTEOPENIA OF BOTH LOWER LEGS: ICD-10-CM

## 2020-06-23 DIAGNOSIS — F02.818 LATE ONSET ALZHEIMER'S DISEASE WITH BEHAVIORAL DISTURBANCE (HCC): ICD-10-CM

## 2020-06-23 DIAGNOSIS — R41.3 MEMORY CHANGES: ICD-10-CM

## 2020-06-23 DIAGNOSIS — E53.8 B12 DEFICIENCY: ICD-10-CM

## 2020-06-23 DIAGNOSIS — E78.00 PURE HYPERCHOLESTEROLEMIA: ICD-10-CM

## 2020-06-23 DIAGNOSIS — G30.1 LATE ONSET ALZHEIMER'S DISEASE WITH BEHAVIORAL DISTURBANCE (HCC): ICD-10-CM

## 2020-06-23 DIAGNOSIS — M85.861 OSTEOPENIA OF BOTH LOWER LEGS: ICD-10-CM

## 2020-06-23 DIAGNOSIS — R73.01 IMPAIRED FASTING GLUCOSE: ICD-10-CM

## 2020-06-23 LAB
ALBUMIN SERPL-MCNC: 5 G/DL (ref 3.5–5.2)
ALP BLD-CCNC: 113 U/L (ref 35–104)
ALT SERPL-CCNC: 15 U/L (ref 5–33)
ANION GAP SERPL CALCULATED.3IONS-SCNC: 14 MMOL/L (ref 7–19)
AST SERPL-CCNC: 13 U/L (ref 5–32)
BILIRUB SERPL-MCNC: 0.6 MG/DL (ref 0.2–1.2)
BUN BLDV-MCNC: 13 MG/DL (ref 8–23)
CALCIUM SERPL-MCNC: 10.2 MG/DL (ref 8.8–10.2)
CHLORIDE BLD-SCNC: 103 MMOL/L (ref 98–111)
CO2: 27 MMOL/L (ref 22–29)
CREAT SERPL-MCNC: 0.7 MG/DL (ref 0.5–0.9)
GFR NON-AFRICAN AMERICAN: >60
GLUCOSE BLD-MCNC: 87 MG/DL (ref 74–109)
POTASSIUM SERPL-SCNC: 3.7 MMOL/L (ref 3.5–5)
SODIUM BLD-SCNC: 144 MMOL/L (ref 136–145)
TOTAL PROTEIN: 7.3 G/DL (ref 6.6–8.7)
VITAMIN B-12: >2000 PG/ML (ref 211–946)
VITAMIN D 25-HYDROXY: 42.6 NG/ML

## 2020-06-23 PROCEDURE — G8420 CALC BMI NORM PARAMETERS: HCPCS | Performed by: INTERNAL MEDICINE

## 2020-06-23 PROCEDURE — 96372 THER/PROPH/DIAG INJ SC/IM: CPT | Performed by: INTERNAL MEDICINE

## 2020-06-23 PROCEDURE — 99214 OFFICE O/P EST MOD 30 MIN: CPT | Performed by: INTERNAL MEDICINE

## 2020-06-23 PROCEDURE — 1123F ACP DISCUSS/DSCN MKR DOCD: CPT | Performed by: INTERNAL MEDICINE

## 2020-06-23 PROCEDURE — G8400 PT W/DXA NO RESULTS DOC: HCPCS | Performed by: INTERNAL MEDICINE

## 2020-06-23 PROCEDURE — 1036F TOBACCO NON-USER: CPT | Performed by: INTERNAL MEDICINE

## 2020-06-23 PROCEDURE — G8427 DOCREV CUR MEDS BY ELIG CLIN: HCPCS | Performed by: INTERNAL MEDICINE

## 2020-06-23 PROCEDURE — 4040F PNEUMOC VAC/ADMIN/RCVD: CPT | Performed by: INTERNAL MEDICINE

## 2020-06-23 PROCEDURE — 1090F PRES/ABSN URINE INCON ASSESS: CPT | Performed by: INTERNAL MEDICINE

## 2020-06-23 RX ORDER — CYANOCOBALAMIN 1000 UG/ML
1000 INJECTION INTRAMUSCULAR; SUBCUTANEOUS
Qty: 1 ML | Refills: 5 | Status: SHIPPED | OUTPATIENT
Start: 2020-06-23 | End: 2021-02-24 | Stop reason: SDUPTHER

## 2020-06-23 RX ORDER — CYANOCOBALAMIN 1000 UG/ML
1000 INJECTION INTRAMUSCULAR; SUBCUTANEOUS ONCE
Status: COMPLETED | OUTPATIENT
Start: 2020-06-23 | End: 2020-06-23

## 2020-06-23 RX ORDER — ATORVASTATIN CALCIUM 20 MG/1
TABLET, FILM COATED ORAL
Qty: 90 TABLET | Refills: 1 | Status: ON HOLD
Start: 2020-06-23 | End: 2021-04-23 | Stop reason: HOSPADM

## 2020-06-23 RX ADMIN — CYANOCOBALAMIN 1000 MCG: 1000 INJECTION INTRAMUSCULAR; SUBCUTANEOUS at 14:53

## 2020-06-23 ASSESSMENT — ENCOUNTER SYMPTOMS
SORE THROAT: 0
WHEEZING: 0
CHEST TIGHTNESS: 0
CONSTIPATION: 0
COUGH: 0
ABDOMINAL PAIN: 0

## 2020-06-23 ASSESSMENT — PATIENT HEALTH QUESTIONNAIRE - PHQ9
SUM OF ALL RESPONSES TO PHQ QUESTIONS 1-9: 0
SUM OF ALL RESPONSES TO PHQ QUESTIONS 1-9: 0
2. FEELING DOWN, DEPRESSED OR HOPELESS: 0
SUM OF ALL RESPONSES TO PHQ9 QUESTIONS 1 & 2: 0
1. LITTLE INTEREST OR PLEASURE IN DOING THINGS: 0

## 2020-06-23 NOTE — PROGRESS NOTES
Pure hypercholesterolemia 08/05/2017    Generalized anxiety disorder 08/05/2017    Vitamin D deficiency 08/05/2017    Persistent insomnia 08/05/2017    Impaired fasting glucose 08/05/2017    Carotid artery stenosis 08/05/2017     Overview Note:     Rt carotid 50%; repeat 2014 internal ok/ external  At 50%      Osteopenia of both lower legs 08/05/2017     Overview Note:     12/15  Hip neck -1.9/ other nl      Fatigue 08/05/2017    Chronic vertigo 08/05/2017     Overview Note:     All eval in past has been negative      Nontoxic multinodular goiter 08/05/2017    OA (osteoarthritis) of knee 08/05/2017     Past Medical History:   Diagnosis Date    ADD (attention deficit disorder)     Carotid atherosclerosis     Degeneration of cervical intervertebral disc     Mixed hyperlipidemia     Nontoxic multinodular goiter     OA (osteoarthritis)     Osteopenia     Panic disorder without agoraphobia     Peripheral neuropathy     Pure hypercholesterolemia     Urinary incontinence     Vitamin D deficiency       Past Surgical History:   Procedure Laterality Date    COLONOSCOPY      HYSTERECTOMY, TOTAL ABDOMINAL      w/removal of both ovaries    JOINT REPLACEMENT      knee    TOTAL KNEE ARTHROPLASTY Right 2011    TOTAL KNEE ARTHROPLASTY Left 2012    VAGINA SURGERY      Abdomino-Vaginal Vesical Neck Suspension     Current Outpatient Medications   Medication Sig Dispense Refill    atorvastatin (LIPITOR) 20 MG tablet Take 1 tablet by mouth once daily 90 tablet 1    cyanocobalamin 1000 MCG/ML injection Inject 1 mL into the muscle every 30 days 1 mL 5    meclizine (ANTIVERT) 25 MG tablet TAKE 1 TABLET BY MOUTH THREE TIMES DAILY AS NEEDED DIZZINESS 30 tablet 0    Memantine HCl ER 7 & 14 & 21 &28 MG CP24 Take 1 capsule by mouth daily (with breakfast) 28 capsule 0    donepezil (ARICEPT) 10 MG tablet TAKE 1 TABLET BY MOUTH ONCE DAILY AT NIGHT 30 tablet 5    QUEtiapine (SEROQUEL) 25 MG tablet Take 1-2 each

## 2020-06-24 ENCOUNTER — TELEPHONE (OUTPATIENT)
Dept: INTERNAL MEDICINE | Age: 75
End: 2020-06-24

## 2020-06-24 RX ORDER — ERGOCALCIFEROL 1.25 MG/1
50000 CAPSULE ORAL WEEKLY
Qty: 12 CAPSULE | Refills: 1 | Status: SHIPPED | OUTPATIENT
Start: 2020-06-24

## 2020-07-09 ENCOUNTER — TELEPHONE (OUTPATIENT)
Dept: NEUROSURGERY | Age: 75
End: 2020-07-09

## 2020-07-09 NOTE — TELEPHONE ENCOUNTER
Patients family called stated that they needed to get to some paperwork to Dr Chaim Jay. Wanted a E mail address sso he could send a PDF for a long term care form he is wanting these papers if he can filled out tomorrow.  Can you call patients family Sela Ganser [phone]

## 2020-07-10 NOTE — TELEPHONE ENCOUNTER
I returned the phone call to patients son, Dana Lovett, I let him know that we can't accept documents via email due to hipaa but that they can either be faxed from the facility to our office or he is welcome to bring them in and leave them with the  and we will address them as quickly as we can. Dana Lovett voiced understanding and states he will bring the paperwork to the office for Dr Fartun Meza to look at. I did advise that for all paperwork or forms we have a 8-98 business day policy but that we will get them taken care of as quickly as possible. Dana Lovett voiced understanding.

## 2020-07-12 ENCOUNTER — TELEPHONE (OUTPATIENT)
Dept: INTERNAL MEDICINE | Age: 75
End: 2020-07-12

## 2020-07-12 NOTE — PROGRESS NOTES
Patient's son called and said patient was having dizziness and weakness. Patient son states that patient has Alzheimer's and her  understood and her 's letter do whatever she wants to do. Patient son stated was going to call Dr. Carollynn Epley tomorrow about getting guardianship over his parents. Patient's son lives in [address] [place] [address]. Muriel Kraus

## 2020-07-16 ENCOUNTER — TELEPHONE (OUTPATIENT)
Dept: NEUROSURGERY | Age: 75
End: 2020-07-16

## 2020-07-16 NOTE — TELEPHONE ENCOUNTER
Patients son called wanting to make Dr Leighton Leon aware about the patients behavior. She is becoming very manipulating, patient is also  acting out. Son stated that she is attention seeking if she isn't the center of attention she will act out make herself seem sick. Patients son is asking if she can be prescribed something that would help with this behavior.  Please advise

## 2020-07-20 ENCOUNTER — NURSE ONLY (OUTPATIENT)
Dept: INTERNAL MEDICINE | Age: 75
End: 2020-07-20
Payer: MEDICARE

## 2020-07-20 PROCEDURE — 86580 TB INTRADERMAL TEST: CPT | Performed by: INTERNAL MEDICINE

## 2020-07-20 NOTE — PROGRESS NOTES
Tuberculin skin test applied to left ventral forearm. Explained how to read the test, measuring induration not just erythema; she will come into office if test appears positive.

## 2020-07-21 NOTE — TELEPHONE ENCOUNTER
Requested Prescriptions     Pending Prescriptions Disp Refills    QUEtiapine (SEROQUEL) 25 MG tablet [Pharmacy Med Name: QUETIAPINE 25MG TAB] 60 tablet 0     Sig: TAKE 1 OR 2 TABLETS BY MOUTH EACH EVENING

## 2020-07-22 RX ORDER — QUETIAPINE FUMARATE 25 MG/1
TABLET, FILM COATED ORAL
Qty: 60 TABLET | Refills: 5 | Status: SHIPPED | OUTPATIENT
Start: 2020-07-22

## 2020-07-23 ENCOUNTER — TELEPHONE (OUTPATIENT)
Dept: INTERNAL MEDICINE | Age: 75
End: 2020-07-23

## 2020-07-23 ENCOUNTER — TELEPHONE (OUTPATIENT)
Dept: INTERNAL MEDICINE CLINIC | Age: 75
End: 2020-07-23

## 2020-07-23 NOTE — TELEPHONE ENCOUNTER
I  Just got a message that the pt son was under the impression that [facility] would be assisting pt with eye drops post op 4 times a day for multiple days after surgery     - HH will not be able to go that many times per day, for multple days and it going to need to be able to teach someone if possible ,  but they are going to call and talk to the pt son about what all they can do for this pt   Just wanted you to be aware

## 2020-07-23 NOTE — TELEPHONE ENCOUNTER
Pt son called and she is having cataract surgery an he is needing a  nurse to go out and administer the pre and post op eye drops. Is this something we can order or does it need to go thru the eye surgeon.   She is going into a nursing home but she already had this surgery scheduled so it will be after the surgery

## 2020-07-24 ENCOUNTER — TELEPHONE (OUTPATIENT)
Dept: CASE MANAGEMENT | Age: 75
End: 2020-07-24

## 2020-07-24 NOTE — TELEPHONE ENCOUNTER
Please let Dr. Mk Worley know that the following patient was NOT admitted to home health services. Note from [address]:    Talked with patient's son who is poa, Sela Ganser, several times. He stated that he just needs someone to apply his mother's eye drops 4x a day for mother's upcoming cataract surgery on monday. That she is not allowing her  to do so. That she makes excuses and then  will not do it. Also, there is a sister-in-law to her but patient no longer likes her. That [facility] but found out that the surgeon will not take anyone who is residing at a facility due to covid 19 precautions. That he had reached out to several stay at home agencies but they do not do eye drops. I told him I would try and obtain a sitter list or see if someone would be able to assist. I talked with  Diana Guillen and she stated that just patient needing eye drops placed and not being able to teach someone would not be covered with homecare. I contacted a former nurse who had retired and she stated she would not be interested but knew a person who helps with the elderly that may be able to assist, [employer]. She was going to contact her that she may be receiving a phone call from the son. I contacted the son and he was interested and stated would call her. I told him if she was not interested to call me back and I would have a sitter list faxed to him tomorrow. I did not hear back from him tonight. Plan to [facility] patient chart tomorrow. Hilda please inform md. Thank you.

## 2020-08-17 ENCOUNTER — TELEPHONE (OUTPATIENT)
Dept: INTERNAL MEDICINE | Age: 75
End: 2020-08-17

## 2020-08-18 ENCOUNTER — OFFICE VISIT (OUTPATIENT)
Age: 75
End: 2020-08-18

## 2020-08-18 VITALS — TEMPERATURE: 98.2 F | OXYGEN SATURATION: 94 % | HEART RATE: 76 BPM

## 2020-08-19 LAB — SARS-COV-2, NAA: NOT DETECTED

## 2020-09-25 RX ORDER — MECLIZINE HYDROCHLORIDE 25 MG/1
TABLET ORAL
Qty: 90 TABLET | Refills: 1 | Status: SHIPPED | OUTPATIENT
Start: 2020-09-25 | End: 2020-12-29

## 2020-11-20 ENCOUNTER — OFFICE VISIT (OUTPATIENT)
Dept: INTERNAL MEDICINE | Age: 75
End: 2020-11-20
Payer: MEDICARE

## 2020-11-20 VITALS
WEIGHT: 127 LBS | RESPIRATION RATE: 18 BRPM | SYSTOLIC BLOOD PRESSURE: 122 MMHG | BODY MASS INDEX: 20.41 KG/M2 | HEIGHT: 66 IN | OXYGEN SATURATION: 97 % | HEART RATE: 86 BPM | DIASTOLIC BLOOD PRESSURE: 80 MMHG

## 2020-11-20 DIAGNOSIS — E78.00 PURE HYPERCHOLESTEROLEMIA: ICD-10-CM

## 2020-11-20 DIAGNOSIS — E05.90 HYPERTHYROIDISM: ICD-10-CM

## 2020-11-20 DIAGNOSIS — R41.3 MEMORY CHANGES: ICD-10-CM

## 2020-11-20 DIAGNOSIS — F02.818 LATE ONSET ALZHEIMER'S DISEASE WITH BEHAVIORAL DISTURBANCE (HCC): ICD-10-CM

## 2020-11-20 DIAGNOSIS — R73.01 IMPAIRED FASTING GLUCOSE: ICD-10-CM

## 2020-11-20 DIAGNOSIS — G30.1 LATE ONSET ALZHEIMER'S DISEASE WITH BEHAVIORAL DISTURBANCE (HCC): ICD-10-CM

## 2020-11-20 DIAGNOSIS — E55.9 VITAMIN D DEFICIENCY: ICD-10-CM

## 2020-11-20 DIAGNOSIS — E53.8 B12 DEFICIENCY: ICD-10-CM

## 2020-11-20 DIAGNOSIS — F41.1 GENERALIZED ANXIETY DISORDER: ICD-10-CM

## 2020-11-20 LAB
ANION GAP SERPL CALCULATED.3IONS-SCNC: 10 MMOL/L (ref 7–19)
BASOPHILS ABSOLUTE: 0.1 K/UL (ref 0–0.2)
BASOPHILS RELATIVE PERCENT: 0.7 % (ref 0–1)
BUN BLDV-MCNC: 16 MG/DL (ref 8–23)
CALCIUM SERPL-MCNC: 9.9 MG/DL (ref 8.8–10.2)
CHLORIDE BLD-SCNC: 103 MMOL/L (ref 98–111)
CHOLESTEROL, TOTAL: 144 MG/DL (ref 160–199)
CO2: 27 MMOL/L (ref 22–29)
CREAT SERPL-MCNC: 0.6 MG/DL (ref 0.5–0.9)
EOSINOPHILS ABSOLUTE: 0.1 K/UL (ref 0–0.6)
EOSINOPHILS RELATIVE PERCENT: 0.6 % (ref 0–5)
GFR AFRICAN AMERICAN: >59
GFR NON-AFRICAN AMERICAN: >60
GLUCOSE BLD-MCNC: 101 MG/DL (ref 74–109)
HCT VFR BLD CALC: 47.3 % (ref 37–47)
HDLC SERPL-MCNC: 71 MG/DL (ref 65–121)
HEMOGLOBIN: 15.2 G/DL (ref 12–16)
IMMATURE GRANULOCYTES #: 0 K/UL
LDL CHOLESTEROL CALCULATED: 56 MG/DL
LYMPHOCYTES ABSOLUTE: 2.4 K/UL (ref 1.1–4.5)
LYMPHOCYTES RELATIVE PERCENT: 24 % (ref 20–40)
MCH RBC QN AUTO: 29.9 PG (ref 27–31)
MCHC RBC AUTO-ENTMCNC: 32.1 G/DL (ref 33–37)
MCV RBC AUTO: 92.9 FL (ref 81–99)
MONOCYTES ABSOLUTE: 0.7 K/UL (ref 0–0.9)
MONOCYTES RELATIVE PERCENT: 6.7 % (ref 0–10)
NEUTROPHILS ABSOLUTE: 6.7 K/UL (ref 1.5–7.5)
NEUTROPHILS RELATIVE PERCENT: 67.7 % (ref 50–65)
PDW BLD-RTO: 13.3 % (ref 11.5–14.5)
PLATELET # BLD: 222 K/UL (ref 130–400)
PMV BLD AUTO: 11.5 FL (ref 9.4–12.3)
POTASSIUM SERPL-SCNC: 4.3 MMOL/L (ref 3.5–5)
RBC # BLD: 5.09 M/UL (ref 4.2–5.4)
SODIUM BLD-SCNC: 140 MMOL/L (ref 136–145)
TRIGL SERPL-MCNC: 85 MG/DL (ref 0–149)
TSH SERPL DL<=0.05 MIU/L-ACNC: 2.34 UIU/ML (ref 0.27–4.2)
VITAMIN B-12: >2000 PG/ML (ref 211–946)
WBC # BLD: 10 K/UL (ref 4.8–10.8)

## 2020-11-20 PROCEDURE — 1090F PRES/ABSN URINE INCON ASSESS: CPT | Performed by: INTERNAL MEDICINE

## 2020-11-20 PROCEDURE — G8420 CALC BMI NORM PARAMETERS: HCPCS | Performed by: INTERNAL MEDICINE

## 2020-11-20 PROCEDURE — 96372 THER/PROPH/DIAG INJ SC/IM: CPT | Performed by: INTERNAL MEDICINE

## 2020-11-20 PROCEDURE — G8400 PT W/DXA NO RESULTS DOC: HCPCS | Performed by: INTERNAL MEDICINE

## 2020-11-20 PROCEDURE — 99214 OFFICE O/P EST MOD 30 MIN: CPT | Performed by: INTERNAL MEDICINE

## 2020-11-20 PROCEDURE — 1123F ACP DISCUSS/DSCN MKR DOCD: CPT | Performed by: INTERNAL MEDICINE

## 2020-11-20 PROCEDURE — G8482 FLU IMMUNIZE ORDER/ADMIN: HCPCS | Performed by: INTERNAL MEDICINE

## 2020-11-20 PROCEDURE — G8427 DOCREV CUR MEDS BY ELIG CLIN: HCPCS | Performed by: INTERNAL MEDICINE

## 2020-11-20 PROCEDURE — 1036F TOBACCO NON-USER: CPT | Performed by: INTERNAL MEDICINE

## 2020-11-20 PROCEDURE — 4040F PNEUMOC VAC/ADMIN/RCVD: CPT | Performed by: INTERNAL MEDICINE

## 2020-11-20 RX ORDER — CYANOCOBALAMIN 1000 UG/ML
1000 INJECTION INTRAMUSCULAR; SUBCUTANEOUS ONCE
Status: COMPLETED | OUTPATIENT
Start: 2020-11-20 | End: 2020-11-20

## 2020-11-20 RX ADMIN — CYANOCOBALAMIN 1000 MCG: 1000 INJECTION INTRAMUSCULAR; SUBCUTANEOUS at 17:13

## 2020-11-20 ASSESSMENT — ENCOUNTER SYMPTOMS
CONSTIPATION: 0
ABDOMINAL PAIN: 0
COUGH: 0
WHEEZING: 0
CHEST TIGHTNESS: 0
SORE THROAT: 0

## 2020-11-20 NOTE — PROGRESS NOTES
Chief Complaint   Patient presents with    3 Month Follow-Up    Diarrhea     Worse now than it was in the past, States that she has always has this but seems to be worse     History of presenting illness:  Iesha Mcdonough is a71 y.o. female who presents today for follow up on her chronic medical conditions as noted below.     Late onset Alzheimer's disease with behavioral disturbance ([facility] [address]  Memory changes  Hallucinations  Generalized anxiety disorder  Now follows neurology   Stays at [facility] assisted living     Pure hypercholesterolemia-she has tried to stay on diet, started lipitor 20 daily 6 mo ago     Impaired fasting glucose-has tried to stay on diet and avoid concentrated sweets     Vitamin D deficiency-she takes vitamin D supplement 50,000 IU weekly     Persistent insomnia-she takes Sonata at bedtime to help her sleep occasionalyy / does not need refill today     Generalized anxiety disorder- she has occasional panic attacks, takes occasional Ativan tablet when necessary only       Patient Active Problem List    Diagnosis Date Noted    Nail fungal infection 11/22/2019    Late onset Alzheimer's disease without behavioral disturbance ([facility] [address] 09/19/2019    Unspecified hearing loss, bilateral 06/04/2018    Tinnitus aurium, bilateral 06/04/2018    Vertigo, benign paroxysmal, right 06/04/2018    Benign paroxysmal positional vertigo due to bilateral vestibular disorder 05/15/2018    Allergic rhinitis 08/10/2017    Attention deficit disorder (ADD) without hyperactivity 08/10/2017    Auditory hallucinations 08/10/2017    Carotid atherosclerosis 08/10/2017    Cervicalgia 08/10/2017    Degeneration of cervical intervertebral disc 08/10/2017    Disc degeneration, lumbosacral 08/10/2017    Dizziness 08/10/2017    Dysuria 08/10/2017    Hypercalcemia 08/10/2017    Lactose intolerance 08/10/2017    Menopausal symptoms 08/10/2017    Microhematuria 08/10/2017    Palpitations 08/10/2017    Panic disorder without agoraphobia 08/10/2017    Paroxysmal hypertension 08/10/2017    Peripheral neuropathy 08/10/2017    Urine incontinence 08/10/2017    Pure hypercholesterolemia 08/05/2017    Generalized anxiety disorder 08/05/2017    Vitamin D deficiency 08/05/2017    Persistent insomnia 08/05/2017    Impaired fasting glucose 08/05/2017    Carotid artery stenosis 08/05/2017     Overview Note:     Rt carotid 50%; repeat 2014 internal ok/ external  At 50%      Osteopenia of both lower legs 08/05/2017     Overview Note:     12/15  Hip neck -1.9/ other nl      Fatigue 08/05/2017    Chronic vertigo 08/05/2017     Overview Note:     All eval in past has been negative      Nontoxic multinodular goiter 08/05/2017    OA (osteoarthritis) of knee 08/05/2017     Past Medical History:   Diagnosis Date    ADD (attention deficit disorder)     Carotid atherosclerosis     Degeneration of cervical intervertebral disc     Mixed hyperlipidemia     Nontoxic multinodular goiter     OA (osteoarthritis)     Osteopenia     Panic disorder without agoraphobia     Peripheral neuropathy     Pure hypercholesterolemia     Urinary incontinence     Vitamin D deficiency       Past Surgical History:   Procedure Laterality Date    COLONOSCOPY      HYSTERECTOMY, TOTAL ABDOMINAL      w/removal of both ovaries    JOINT REPLACEMENT      knee    TOTAL KNEE ARTHROPLASTY Right 2011    TOTAL KNEE ARTHROPLASTY Left 2012    VAGINA SURGERY      Abdomino-Vaginal Vesical Neck Suspension     Current Outpatient Medications   Medication Sig Dispense Refill    meclizine (ANTIVERT) 25 MG tablet TAKE 1 TABLET BY MOUTH THREE TIMES DAILY AS NEEDED DIZZINESS 90 tablet 1    QUEtiapine (SEROQUEL) 25 MG tablet TAKE 1 OR 2 TABLETS BY MOUTH EACH EVENING 60 tablet 5    vitamin D (ERGOCALCIFEROL) 1.25 MG (82740 UT) CAPS capsule Take 1 capsule by mouth once a week 12 capsule 1    atorvastatin (LIPITOR) 20 MG tablet Take 1 tablet by mouth once Heart Disease Other        Review of Systems   Constitutional: Positive for fatigue. Negative for chills and fever. HENT: Negative for congestion, ear pain, nosebleeds, postnasal drip and sore throat. Respiratory: Negative for cough, chest tightness and wheezing. Cardiovascular: Negative for chest pain, palpitations and leg swelling. Gastrointestinal: Negative for abdominal pain and constipation. Genitourinary: Negative for dysuria and urgency. Musculoskeletal: Positive for arthralgias. Skin: Negative for rash. Neurological: Negative for dizziness and headaches. Psychiatric/Behavioral: Positive for confusion and decreased concentration. The patient is nervous/anxious. Vitals:    11/20/20 1152   BP: 122/80   Site: Left Upper Arm   Position: Sitting   Cuff Size: Large Adult   Pulse: 86   Resp: 18   SpO2: 97%   Weight: 127 lb (57.6 kg)   Height: 5' 6\" (1.676 m)     Body mass index is 20.5 kg/m². Physical Exam  Constitutional:       Appearance: She is well-developed. HENT:      Right Ear: External ear normal.      Left Ear: External ear normal.      Mouth/Throat:      Pharynx: No oropharyngeal exudate. Eyes:      Conjunctiva/sclera: Conjunctivae normal.      Pupils: Pupils are equal, round, and reactive to light. Neck:      Musculoskeletal: Neck supple. Thyroid: No thyromegaly. Vascular: No JVD. Cardiovascular:      Rate and Rhythm: Normal rate. Heart sounds: Normal heart sounds. No murmur. Pulmonary:      Effort: No respiratory distress. Breath sounds: Normal breath sounds. No wheezing or rales. Chest:      Chest wall: No tenderness. Abdominal:      General: Bowel sounds are normal.      Palpations: Abdomen is soft. Lymphadenopathy:      Cervical: No cervical adenopathy. Skin:     General: Skin is warm. Findings: No rash. Lab Review   No visits with results within 2 Month(s) from this visit.    Latest known visit with results is:   Office Visit on 08/18/2020   Component Date Value    SARS-CoV-2, MALIK 08/18/2020 NOT DETECTED            ASSESSMENT/PLAN:    Late onset Alzheimer's disease with behavioral disturbance ([facility] [address]  Memory changes  Hallucination  Generalized anxiety disorder  Pt now under care of neurology  Progressively worse  Taking Aricept+ namenda  Still issues with hallucinations   Recently worse  As per neurology     Hypertension  BP is now well controlled-  Now it is normal range  At this time no prescription is needed  Monitor.     Impaired fasting glucose-  her hemoglobin A1c 5.5 (5.7)(5.6)( 6.0) (  5.7), diet lower in free sugars is recommended     Vitamin D deficiency- 6/2020: 42  her vitamin D level 3/2020: 37 (57)(46)( 51)-   Continue vitamin D supplement 50,000 IU weekly     Generalized anxiety disorder  Takes prn ativan  Does  not need refills today     Pure hypercholesterolemia-  Her LDL  ( 127) (95) (158)( 164) improved since started lipitor  Cont same lipitor 20 daily     Supressed TSh previously  Now nl range     B 12 deficiency  6/2020: nl 2000  292  3/2020  Restarted b12 injections  Ok for b12 injection every 3 months when pt comes to office    -     cyanocobalamin injection 1,000 mcg      Per son request Mini-Mental exam was performed again today  Her Mini-Mental score today is 7/30  Previous in 9/20 19 was 13/30  Patient will have upcoming appointment with her neurologist to discuss this      Orders Placed This Encounter   Procedures    TSH without Reflex    Comprehensive Metabolic Panel    CBC Auto Differential    Hemoglobin A1C    Lipid Panel    Urinalysis    TSH without Reflex    Vitamin D 25 Hydroxy    Vitamin B12     New Prescriptions    No medications on file         No follow-ups on file. There are no Patient Instructions on file for this visit. EMR Dragon/transcription disclaimer:Significant part of this  encounter note is electronic transcription/translationof spoken language to printed text.  The electronic translation of spoken language may be erroneous, or at times, nonsensical words or phrases may be inadvertently transcribed.  Although I have reviewed the note for sucherrors, some may still exist.

## 2020-12-29 RX ORDER — MECLIZINE HYDROCHLORIDE 25 MG/1
TABLET ORAL
Qty: 90 TABLET | Refills: 0 | Status: SHIPPED | OUTPATIENT
Start: 2020-12-29 | End: 2021-03-01

## 2020-12-29 NOTE — TELEPHONE ENCOUNTER
Last Appointment Date: 11/20/2020  Next Appointment Date: Visit date not found    Allergies   Allergen Reactions    Latex     Alcohol      liquid  liquid    Astelin [Azelastine Hcl]      solution  solution    Ciprofloxacin Hcl Swelling    Fluticasone Propionate      suspension  suspension    Hydroquinone      cream    Livalo [Pitavastatin]      tablets    Other      seafood  seafood    Vitamin B Complex [B-100]      Vit B 3 tablets  Vit B 3 tablets

## 2021-02-10 ENCOUNTER — TELEPHONE (OUTPATIENT)
Dept: NEUROLOGY | Age: 76
End: 2021-02-10

## 2021-02-24 ENCOUNTER — OFFICE VISIT (OUTPATIENT)
Dept: INTERNAL MEDICINE | Age: 76
End: 2021-02-24
Payer: MEDICARE

## 2021-02-24 VITALS
SYSTOLIC BLOOD PRESSURE: 110 MMHG | DIASTOLIC BLOOD PRESSURE: 72 MMHG | HEART RATE: 72 BPM | OXYGEN SATURATION: 98 % | BODY MASS INDEX: 20.01 KG/M2 | RESPIRATION RATE: 18 BRPM | WEIGHT: 124 LBS

## 2021-02-24 DIAGNOSIS — R41.3 MEMORY CHANGES: ICD-10-CM

## 2021-02-24 DIAGNOSIS — F03.91 DEMENTIA WITH BEHAVIORAL DISTURBANCE, UNSPECIFIED DEMENTIA TYPE: Primary | ICD-10-CM

## 2021-02-24 DIAGNOSIS — E53.8 B12 DEFICIENCY: ICD-10-CM

## 2021-02-24 DIAGNOSIS — E05.90 HYPERTHYROIDISM: ICD-10-CM

## 2021-02-24 DIAGNOSIS — E55.9 VITAMIN D DEFICIENCY: ICD-10-CM

## 2021-02-24 DIAGNOSIS — F41.1 GENERALIZED ANXIETY DISORDER: ICD-10-CM

## 2021-02-24 DIAGNOSIS — E78.00 PURE HYPERCHOLESTEROLEMIA: ICD-10-CM

## 2021-02-24 DIAGNOSIS — R73.01 IMPAIRED FASTING GLUCOSE: ICD-10-CM

## 2021-02-24 LAB
ANION GAP SERPL CALCULATED.3IONS-SCNC: 13 MMOL/L (ref 7–19)
BASOPHILS ABSOLUTE: 0.1 K/UL (ref 0–0.2)
BASOPHILS RELATIVE PERCENT: 0.9 % (ref 0–1)
BUN BLDV-MCNC: 13 MG/DL (ref 8–23)
CALCIUM SERPL-MCNC: 9.5 MG/DL (ref 8.8–10.2)
CHLORIDE BLD-SCNC: 105 MMOL/L (ref 98–111)
CO2: 25 MMOL/L (ref 22–29)
CREAT SERPL-MCNC: 0.7 MG/DL (ref 0.5–0.9)
EOSINOPHILS ABSOLUTE: 0 K/UL (ref 0–0.6)
EOSINOPHILS RELATIVE PERCENT: 0.7 % (ref 0–5)
GFR AFRICAN AMERICAN: >59
GFR NON-AFRICAN AMERICAN: >60
GLUCOSE BLD-MCNC: 100 MG/DL (ref 74–109)
HCT VFR BLD CALC: 44.6 % (ref 37–47)
HEMOGLOBIN: 14.9 G/DL (ref 12–16)
IMMATURE GRANULOCYTES #: 0 K/UL
LYMPHOCYTES ABSOLUTE: 1 K/UL (ref 1.1–4.5)
LYMPHOCYTES RELATIVE PERCENT: 18.6 % (ref 20–40)
MCH RBC QN AUTO: 30.6 PG (ref 27–31)
MCHC RBC AUTO-ENTMCNC: 33.4 G/DL (ref 33–37)
MCV RBC AUTO: 91.6 FL (ref 81–99)
MONOCYTES ABSOLUTE: 0.5 K/UL (ref 0–0.9)
MONOCYTES RELATIVE PERCENT: 8.2 % (ref 0–10)
NEUTROPHILS ABSOLUTE: 3.9 K/UL (ref 1.5–7.5)
NEUTROPHILS RELATIVE PERCENT: 71.2 % (ref 50–65)
PDW BLD-RTO: 13 % (ref 11.5–14.5)
PLATELET # BLD: 180 K/UL (ref 130–400)
PMV BLD AUTO: 12.3 FL (ref 9.4–12.3)
POTASSIUM SERPL-SCNC: 4.1 MMOL/L (ref 3.5–5)
RBC # BLD: 4.87 M/UL (ref 4.2–5.4)
SODIUM BLD-SCNC: 143 MMOL/L (ref 136–145)
TSH SERPL DL<=0.05 MIU/L-ACNC: 1.39 UIU/ML (ref 0.27–4.2)
WBC # BLD: 5.5 K/UL (ref 4.8–10.8)

## 2021-02-24 PROCEDURE — 1123F ACP DISCUSS/DSCN MKR DOCD: CPT | Performed by: INTERNAL MEDICINE

## 2021-02-24 PROCEDURE — G8420 CALC BMI NORM PARAMETERS: HCPCS | Performed by: INTERNAL MEDICINE

## 2021-02-24 PROCEDURE — 4040F PNEUMOC VAC/ADMIN/RCVD: CPT | Performed by: INTERNAL MEDICINE

## 2021-02-24 PROCEDURE — 1036F TOBACCO NON-USER: CPT | Performed by: INTERNAL MEDICINE

## 2021-02-24 PROCEDURE — 99214 OFFICE O/P EST MOD 30 MIN: CPT | Performed by: INTERNAL MEDICINE

## 2021-02-24 PROCEDURE — G8427 DOCREV CUR MEDS BY ELIG CLIN: HCPCS | Performed by: INTERNAL MEDICINE

## 2021-02-24 PROCEDURE — G8482 FLU IMMUNIZE ORDER/ADMIN: HCPCS | Performed by: INTERNAL MEDICINE

## 2021-02-24 PROCEDURE — G8400 PT W/DXA NO RESULTS DOC: HCPCS | Performed by: INTERNAL MEDICINE

## 2021-02-24 PROCEDURE — 1090F PRES/ABSN URINE INCON ASSESS: CPT | Performed by: INTERNAL MEDICINE

## 2021-02-24 PROCEDURE — 96372 THER/PROPH/DIAG INJ SC/IM: CPT | Performed by: INTERNAL MEDICINE

## 2021-02-24 RX ORDER — CYANOCOBALAMIN 1000 UG/ML
1000 INJECTION INTRAMUSCULAR; SUBCUTANEOUS ONCE
Status: COMPLETED | OUTPATIENT
Start: 2021-02-24 | End: 2021-02-24

## 2021-02-24 RX ORDER — CYANOCOBALAMIN 1000 UG/ML
1000 INJECTION INTRAMUSCULAR; SUBCUTANEOUS
Qty: 1 ML | Refills: 5 | Status: ON HOLD
Start: 2021-02-24 | End: 2021-04-23 | Stop reason: HOSPADM

## 2021-02-24 RX ADMIN — CYANOCOBALAMIN 1000 MCG: 1000 INJECTION INTRAMUSCULAR; SUBCUTANEOUS at 13:18

## 2021-02-24 ASSESSMENT — ENCOUNTER SYMPTOMS
SORE THROAT: 0
CHEST TIGHTNESS: 0
COUGH: 0
WHEEZING: 0
ABDOMINAL PAIN: 0
CONSTIPATION: 0

## 2021-02-24 ASSESSMENT — PATIENT HEALTH QUESTIONNAIRE - PHQ9
SUM OF ALL RESPONSES TO PHQ9 QUESTIONS 1 & 2: 0
2. FEELING DOWN, DEPRESSED OR HOPELESS: 0
1. LITTLE INTEREST OR PLEASURE IN DOING THINGS: 0

## 2021-02-24 NOTE — PROGRESS NOTES
Chief Complaint   Patient presents with    Leg Swelling     PT's son gota phone call from [facility] states that bilateral feet swelling and red spots on the top,     History of presenting illness:  Melissa Cash is a64 y.o. female who presents today for follow up on her chronic medical conditions as noted below. PResents today for evaluation with son  Patient resides at [facility] assisted living  Son who lives in [address] [facility] received phone call from [facility] stating that patient has swollen legs  He drove in today to bring patient to our office for evaluation  He states that 1 he saw his mother this morning he did not notice any leg swelling    Ms. Yoils Cai has advanced dementia/memory loss that is progressively worse  Now at times has behavioral issues when she is aggravated and refusing medications  Son is worried about this      Patient Active Problem List    Diagnosis Date Noted    Nail fungal infection 11/22/2019    Late onset Alzheimer's disease without behavioral disturbance ([facility] [address] 09/19/2019    Unspecified hearing loss, bilateral 06/04/2018    Tinnitus aurium, bilateral 06/04/2018    Vertigo, benign paroxysmal, right 06/04/2018    Benign paroxysmal positional vertigo due to bilateral vestibular disorder 05/15/2018    Allergic rhinitis 08/10/2017    Attention deficit disorder (ADD) without hyperactivity 08/10/2017    Auditory hallucinations 08/10/2017    Carotid atherosclerosis 08/10/2017    Cervicalgia 08/10/2017    Degeneration of cervical intervertebral disc 08/10/2017    Disc degeneration, lumbosacral 08/10/2017    Dizziness 08/10/2017    Dysuria 08/10/2017    Hypercalcemia 08/10/2017    Lactose intolerance 08/10/2017    Menopausal symptoms 08/10/2017    Microhematuria 08/10/2017    Palpitations 08/10/2017    Panic disorder without agoraphobia 08/10/2017    Paroxysmal hypertension 08/10/2017    Peripheral neuropathy 08/10/2017    Urine incontinence 08/10/2017    Pure hypercholesterolemia 08/05/2017    Generalized anxiety disorder 08/05/2017    Vitamin D deficiency 08/05/2017    Persistent insomnia 08/05/2017    Impaired fasting glucose 08/05/2017    Carotid artery stenosis 08/05/2017     Overview Note:     Rt carotid 50%; repeat 2014 internal ok/ external  At 50%      Osteopenia of both lower legs 08/05/2017     Overview Note:     12/15  Hip neck -1.9/ other nl      Fatigue 08/05/2017    Chronic vertigo 08/05/2017     Overview Note:     All eval in past has been negative      Nontoxic multinodular goiter 08/05/2017    OA (osteoarthritis) of knee 08/05/2017     Past Medical History:   Diagnosis Date    ADD (attention deficit disorder)     Carotid atherosclerosis     Degeneration of cervical intervertebral disc     Mixed hyperlipidemia     Nontoxic multinodular goiter     OA (osteoarthritis)     Osteopenia     Panic disorder without agoraphobia     Peripheral neuropathy     Pure hypercholesterolemia     Urinary incontinence     Vitamin D deficiency       Past Surgical History:   Procedure Laterality Date    COLONOSCOPY      HYSTERECTOMY, TOTAL ABDOMINAL      w/removal of both ovaries    JOINT REPLACEMENT      knee    TOTAL KNEE ARTHROPLASTY Right 2011    TOTAL KNEE ARTHROPLASTY Left 2012    VAGINA SURGERY      Abdomino-Vaginal Vesical Neck Suspension     Current Outpatient Medications   Medication Sig Dispense Refill    cyanocobalamin 1000 MCG/ML injection Inject 1 mL into the muscle every 30 days 1 mL 5    meclizine (ANTIVERT) 25 MG tablet TAKE 1 TABLET BY MOUTH THREE TIMES DAILY AS NEEDED DIZZINESS 90 tablet 0    donepezil (ARICEPT) 10 MG tablet TAKE 1 TABLET BY MOUTH ONCE AT NIGHT 30 tablet 11    QUEtiapine (SEROQUEL) 25 MG tablet TAKE 1 OR 2 TABLETS BY MOUTH EACH EVENING 60 tablet 5    vitamin D (ERGOCALCIFEROL) 1.25 MG (16173 UT) CAPS capsule Take 1 capsule by mouth once a week 12 capsule 1    atorvastatin (LIPITOR) 20 MG tablet Take 1 tablet by mouth once daily 90 tablet 1    Memantine HCl ER 7 & 14 & 21 &28 MG CP24 Take 1 capsule by mouth daily (with breakfast) 28 capsule 0    colestipol (COLESTID) 1 g tablet Take 1 tablet by mouth daily 30 tablet 3    clotrimazole-betamethasone (LOTRISONE) 1-0.05 % cream Apply topically 2 times daily. 45 g 1    cetirizine (ZYRTEC) 10 MG tablet Take 10 mg by mouth daily       No current facility-administered medications for this visit. Allergies   Allergen Reactions    Latex     Alcohol      liquid  liquid    Astelin [Azelastine Hcl]      solution  solution    Ciprofloxacin Hcl Swelling    Fluticasone Propionate      suspension  suspension    Hydroquinone      cream    Livalo [Pitavastatin]      tablets    Other      seafood  seafood    Vitamin B Complex [B-100]      Vit B 3 tablets  Vit B 3 tablets     Social History     Tobacco Use    Smoking status: Former Smoker     Packs/day: 2.50     Years: 5.00     Pack years: 12.50     Quit date:      Years since quittin.    Smokeless tobacco: Never Used   Substance Use Topics    Alcohol use: Never     Frequency: Never      Family History   Problem Relation Age of Onset    Cancer Mother         Hodgkins Lymphoma, AML    Breast Cancer Mother     Heart Disease Other        Review of Systems   Constitutional: Positive for fatigue. Negative for chills and fever. HENT: Negative for congestion, ear pain, nosebleeds, postnasal drip and sore throat. Respiratory: Negative for cough, chest tightness and wheezing. Cardiovascular: Negative for chest pain, palpitations and leg swelling. Gastrointestinal: Negative for abdominal pain and constipation. Genitourinary: Negative for dysuria and urgency. Musculoskeletal: Negative. Negative for arthralgias. Skin: Negative for rash. Neurological: Negative for dizziness and headaches. Psychiatric/Behavioral: Positive for behavioral problems, confusion and decreased concentration. Vitals:    02/24/21 1235   BP: 110/72   Site: Left Upper Arm   Position: Sitting   Cuff Size: Large Adult   Pulse: 72   Resp: 18   SpO2: 98%   Weight: 124 lb (56.2 kg)     Body mass index is 20.01 kg/m². Physical Exam  Constitutional:       Appearance: She is well-developed. HENT:      Right Ear: External ear normal.      Left Ear: External ear normal.      Mouth/Throat:      Pharynx: No oropharyngeal exudate. Eyes:      Conjunctiva/sclera: Conjunctivae normal.      Pupils: Pupils are equal, round, and reactive to light. Neck:      Musculoskeletal: Neck supple. Thyroid: No thyromegaly. Vascular: No JVD. Cardiovascular:      Rate and Rhythm: Normal rate. Heart sounds: Normal heart sounds. No murmur. Pulmonary:      Effort: No respiratory distress. Breath sounds: Rales present. No wheezing. Chest:      Chest wall: No tenderness. Abdominal:      General: Bowel sounds are normal.      Palpations: Abdomen is soft. Musculoskeletal:      Comments: Arthritic changes present in the feet, no swelling is present  Specifically there is no fluid retention in her lower extremities, ankles or feet   Lymphadenopathy:      Cervical: No cervical adenopathy. Skin:     General: Skin is warm. Findings: No rash. Neurological:      Mental Status: She is disoriented. Lab Review   No visits with results within 2 Month(s) from this visit.    Latest known visit with results is:   Orders Only on 11/20/2020   Component Date Value    Cholesterol, Total 11/20/2020 144*    Triglycerides 11/20/2020 85     HDL 11/20/2020 71     LDL Calculated 11/20/2020 56     WBC 11/20/2020 10.0     RBC 11/20/2020 5.09     Hemoglobin 11/20/2020 15.2     Hematocrit 11/20/2020 47.3*    MCV 11/20/2020 92.9     MCH 11/20/2020 29.9     MCHC 11/20/2020 32.1*    RDW 11/20/2020 13.3     Platelets 85/46/3341 222     MPV 11/20/2020 11.5     Neutrophils % 11/20/2020 67.7*    Lymphocytes % 11/20/2020 24.0     Monocytes % 11/20/2020 6.7     Eosinophils % 11/20/2020 0.6     Basophils % 11/20/2020 0.7     Neutrophils Absolute 11/20/2020 6.7     Immature Granulocytes # 11/20/2020 0.0     Lymphocytes Absolute 11/20/2020 2.4     Monocytes Absolute 11/20/2020 0.70     Eosinophils Absolute 11/20/2020 0.10     Basophils Absolute 11/20/2020 0.10     TSH 11/20/2020 2.340     Vitamin B-12 11/20/2020 >2000*    Sodium 11/20/2020 140     Potassium 11/20/2020 4.3     Chloride 11/20/2020 103     CO2 11/20/2020 27     Anion Gap 11/20/2020 10     Glucose 11/20/2020 101     BUN 11/20/2020 16     CREATININE 11/20/2020 0.6     GFR Non- 11/20/2020 >60     GFR  11/20/2020 >59     Calcium 11/20/2020 9.9            ASSESSMENT/PLAN:  Dementia with behavioral disturbance, unspecified dementia type (HCC)  Advancing dementia with significant confusion and at times behavioral issues when patient refuses medication  Currently recommend patient to follow-up with neurology  Rx Aricept 10 mg p.o. daily  Namenda ER 1 capsule daily  Seroquel 25 mg p.o. twice daily    At this time patient remains at assisted living  Son is considering and will be checking out nursing homes with behavioral care units/memory units      B12 deficiency  RX injection  -     cyanocobalamin 1000 MCG/ML injection; Inject 1 mL into the muscle every 30 days  -  Vitamin D deficiency  Lab results reviewed with patient's son and patient  Rx vitamin D 50,000 IU weekly    Pure hypercholesterolemia  Lab results reviewed with the patient's son  Rx atorvastatin Lipitor 20 mg daily  Liver function tests have been normal      Hyperthyroidism  Mild borderline thyroid elevation on previous lab work  Obtain repeat TSH today  We will call patient with results  -     TSH without Reflex;  Future      Orders Placed This Encounter   Procedures    CBC Auto Differential    Basic Metabolic Panel    TSH without Reflex     New Prescriptions No medications on file         No follow-ups on file. There are no Patient Instructions on file for this visit. EMR Dragon/transcription disclaimer:Significant part of this  encounter note is electronic transcription/translationof spoken language to printed text. The electronic translation of spoken language may be erroneous, or at times, nonsensical words or phrases may be inadvertently transcribed.  Although I have reviewed the note for sucherrors, some may still exist.

## 2021-03-01 RX ORDER — MECLIZINE HYDROCHLORIDE 25 MG/1
TABLET ORAL
Qty: 90 TABLET | Refills: 0 | Status: ON HOLD
Start: 2021-03-01 | End: 2021-04-23 | Stop reason: HOSPADM

## 2021-03-01 RX ORDER — DEXTROMETHORPHAN HYDROBROMIDE AND QUINIDINE SULFATE 20; 10 MG/1; MG/1
CAPSULE, GELATIN COATED ORAL
Qty: 60 CAPSULE | Refills: 5 | Status: ON HOLD
Start: 2021-03-01 | End: 2021-04-23 | Stop reason: HOSPADM

## 2021-03-08 ENCOUNTER — TELEPHONE (OUTPATIENT)
Dept: NEUROLOGY | Age: 76
End: 2021-03-08

## 2021-03-08 NOTE — TELEPHONE ENCOUNTER
----- Message from Nicolás Handley MD sent at 3/1/2021 12:49 PM CST -----  Patient currently at [facility] and still agitated and aggressive at times. I will send in a prescription for Nuedexta. Son spoke with today while here seeing his father. This is just for documentation purposes.

## 2021-03-16 ENCOUNTER — TELEPHONE (OUTPATIENT)
Dept: INTERNAL MEDICINE | Age: 76
End: 2021-03-16

## 2021-03-16 NOTE — TELEPHONE ENCOUNTER
Pt's son called wanting to know if he can give the pt imodium AD in her pill packs as a daily medication or what are your thoughts on this. Pt is having loose bowel movements. Pt's son states that this has been going on for years and even before she went to [facility]. States that he is not sure exactly how many bowel movements she is having a day due to shift change at [facility].

## 2021-04-05 ENCOUNTER — TELEPHONE (OUTPATIENT)
Dept: INTERNAL MEDICINE | Age: 76
End: 2021-04-05

## 2021-04-05 DIAGNOSIS — S90.829A BLISTER OF FOOT, UNSPECIFIED LATERALITY, INITIAL ENCOUNTER: Primary | ICD-10-CM

## 2021-04-05 NOTE — TELEPHONE ENCOUNTER
Pts son called stating that his mother needs a referral for podiatry. Reasoning for referral is for geriatric foot care. Pt has blisters and is in need of toe nail trimming. Pt lives at a [address] [place] [address] facility and [facility] do not provide those services. The pt does not have any preferred podiatrist.    Please Advise.

## 2021-04-19 ENCOUNTER — APPOINTMENT (OUTPATIENT)
Dept: CT IMAGING | Age: 76
DRG: 083 | End: 2021-04-19
Payer: MEDICARE

## 2021-04-19 ENCOUNTER — APPOINTMENT (OUTPATIENT)
Dept: GENERAL RADIOLOGY | Age: 76
DRG: 083 | End: 2021-04-19
Payer: MEDICARE

## 2021-04-19 ENCOUNTER — HOSPITAL ENCOUNTER (INPATIENT)
Age: 76
LOS: 4 days | Discharge: NURSING, REHAB OR LONG-TERM CARE | DRG: 083 | End: 2021-04-23
Attending: EMERGENCY MEDICINE | Admitting: STUDENT IN AN ORGANIZED HEALTH CARE EDUCATION/TRAINING PROGRAM
Payer: MEDICARE

## 2021-04-19 DIAGNOSIS — R40.20 LOSS OF CONSCIOUSNESS (HCC): ICD-10-CM

## 2021-04-19 DIAGNOSIS — F03.91 DEMENTIA WITH BEHAVIORAL DISTURBANCE, UNSPECIFIED DEMENTIA TYPE: ICD-10-CM

## 2021-04-19 DIAGNOSIS — S09.90XA CLOSED HEAD INJURY, INITIAL ENCOUNTER: ICD-10-CM
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
## 2021-04-19 DIAGNOSIS — S06.6X1A TRAUMATIC SUBARACHNOID HEMORRHAGE WITH LOSS OF CONSCIOUSNESS OF 30 MINUTES OR LESS, INITIAL ENCOUNTER (HCC): ICD-10-CM

## 2021-04-19 DIAGNOSIS — S01.01XA LACERATION OF SCALP, INITIAL ENCOUNTER: ICD-10-CM

## 2021-04-19 DIAGNOSIS — S06.5X1A TRAUMATIC SUBDURAL HEMORRHAGE WITH LOSS OF CONSCIOUSNESS OF 30 MINUTES OR LESS, INITIAL ENCOUNTER (HCC): ICD-10-CM

## 2021-04-19 PROBLEM — R41.82 AMS (ALTERED MENTAL STATUS): Status: ACTIVE | Noted: 2021-04-19

## 2021-04-19 PROBLEM — R56.9 WITNESSED SEIZURE-LIKE ACTIVITY (HCC): Status: ACTIVE | Noted: 2021-04-19

## 2021-04-19 PROBLEM — S06.6XAA TRAUMATIC SUBARACHNOID HEMORRHAGE: Status: ACTIVE | Noted: 2021-04-19

## 2021-04-19 PROBLEM — I61.1 PUNCTATE HEMORRHAGE OF LEFT FRONTAL LOBE (HCC): Status: ACTIVE | Noted: 2021-04-19

## 2021-04-19 LAB
ALBUMIN SERPL-MCNC: 4.4 G/DL (ref 3.5–5.2)
ALP BLD-CCNC: 109 U/L (ref 35–104)
ALT SERPL-CCNC: 23 U/L (ref 5–33)
ANION GAP SERPL CALCULATED.3IONS-SCNC: 11 MMOL/L (ref 7–19)
APTT: 26.5 SEC (ref 26–36.2)
APTT: 27.3 SEC (ref 26–36.2)
AST SERPL-CCNC: 32 U/L (ref 5–32)
BACTERIA: NEGATIVE /HPF
BASOPHILS ABSOLUTE: 0.1 K/UL (ref 0–0.2)
BASOPHILS RELATIVE PERCENT: 0.8 % (ref 0–1)
BILIRUB SERPL-MCNC: 0.8 MG/DL (ref 0.2–1.2)
BILIRUBIN URINE: NEGATIVE
BLOOD, URINE: ABNORMAL
BUN BLDV-MCNC: 10 MG/DL (ref 8–23)
CALCIUM SERPL-MCNC: 9.4 MG/DL (ref 8.8–10.2)
CHLORIDE BLD-SCNC: 104 MMOL/L (ref 98–111)
CLARITY: ABNORMAL
CO2: 27 MMOL/L (ref 22–29)
COLOR: YELLOW
CREAT SERPL-MCNC: 0.5 MG/DL (ref 0.5–0.9)
CRYSTALS, UA: ABNORMAL /HPF
EOSINOPHILS ABSOLUTE: 0.1 K/UL (ref 0–0.6)
EOSINOPHILS RELATIVE PERCENT: 0.6 % (ref 0–5)
EPITHELIAL CELLS, UA: 1 /HPF (ref 0–5)
GFR AFRICAN AMERICAN: >59
GFR NON-AFRICAN AMERICAN: >60
GLUCOSE BLD-MCNC: 132 MG/DL (ref 70–99)
GLUCOSE BLD-MCNC: 153 MG/DL (ref 74–109)
GLUCOSE URINE: NEGATIVE MG/DL
HCT VFR BLD CALC: 45.4 % (ref 37–47)
HEMOGLOBIN: 14.8 G/DL (ref 12–16)
HYALINE CASTS: 2 /HPF (ref 0–8)
IMMATURE GRANULOCYTES #: 0.1 K/UL
INR BLD: 0.98 (ref 0.88–1.18)
KETONES, URINE: NEGATIVE MG/DL
LEUKOCYTE ESTERASE, URINE: NEGATIVE
LYMPHOCYTES ABSOLUTE: 1.4 K/UL (ref 1.1–4.5)
LYMPHOCYTES RELATIVE PERCENT: 17.9 % (ref 20–40)
MCH RBC QN AUTO: 30.1 PG (ref 27–31)
MCHC RBC AUTO-ENTMCNC: 32.6 G/DL (ref 33–37)
MCV RBC AUTO: 92.5 FL (ref 81–99)
MONOCYTES ABSOLUTE: 0.4 K/UL (ref 0–0.9)
MONOCYTES RELATIVE PERCENT: 4.5 % (ref 0–10)
NEUTROPHILS ABSOLUTE: 5.9 K/UL (ref 1.5–7.5)
NEUTROPHILS RELATIVE PERCENT: 75.4 % (ref 50–65)
NITRITE, URINE: NEGATIVE
PDW BLD-RTO: 13.6 % (ref 11.5–14.5)
PERFORMED ON: ABNORMAL
PH UA: 7.5 (ref 5–8)
PLATELET # BLD: 168 K/UL (ref 130–400)
PMV BLD AUTO: 11.8 FL (ref 9.4–12.3)
POTASSIUM REFLEX MAGNESIUM: 4.4 MMOL/L (ref 3.5–5)
PROLACTIN: 28.13 NG/ML (ref 4.79–23.3)
PROTEIN UA: ABNORMAL MG/DL
PROTHROMBIN TIME: 12.9 SEC (ref 12–14.6)
RBC # BLD: 4.91 M/UL (ref 4.2–5.4)
RBC UA: 4 /HPF (ref 0–4)
SARS-COV-2, NAAT: NOT DETECTED
SODIUM BLD-SCNC: 142 MMOL/L (ref 136–145)
SPECIFIC GRAVITY UA: 1.01 (ref 1–1.03)
TOTAL PROTEIN: 6.9 G/DL (ref 6.6–8.7)
TROPONIN: <0.01 NG/ML (ref 0–0.03)
UROBILINOGEN, URINE: 0.2 E.U./DL
WBC # BLD: 7.8 K/UL (ref 4.8–10.8)
WBC UA: 1 /HPF (ref 0–5)

## 2021-04-19 PROCEDURE — 70450 CT HEAD/BRAIN W/O DYE: CPT

## 2021-04-19 PROCEDURE — 85025 COMPLETE CBC W/AUTO DIFF WBC: CPT

## 2021-04-19 PROCEDURE — 85730 THROMBOPLASTIN TIME PARTIAL: CPT

## 2021-04-19 PROCEDURE — 93005 ELECTROCARDIOGRAM TRACING: CPT | Performed by: EMERGENCY MEDICINE

## 2021-04-19 PROCEDURE — 2580000003 HC RX 258: Performed by: STUDENT IN AN ORGANIZED HEALTH CARE EDUCATION/TRAINING PROGRAM

## 2021-04-19 PROCEDURE — 95816 EEG AWAKE AND DROWSY: CPT

## 2021-04-19 PROCEDURE — 99283 EMERGENCY DEPT VISIT LOW MDM: CPT

## 2021-04-19 PROCEDURE — 6360000002 HC RX W HCPCS: Performed by: STUDENT IN AN ORGANIZED HEALTH CARE EDUCATION/TRAINING PROGRAM

## 2021-04-19 PROCEDURE — 72170 X-RAY EXAM OF PELVIS: CPT

## 2021-04-19 PROCEDURE — 12002 RPR S/N/AX/GEN/TRNK2.6-7.5CM: CPT

## 2021-04-19 PROCEDURE — 92610 EVALUATE SWALLOWING FUNCTION: CPT

## 2021-04-19 PROCEDURE — 81001 URINALYSIS AUTO W/SCOPE: CPT

## 2021-04-19 PROCEDURE — 84484 ASSAY OF TROPONIN QUANT: CPT

## 2021-04-19 PROCEDURE — 6360000002 HC RX W HCPCS: Performed by: EMERGENCY MEDICINE

## 2021-04-19 PROCEDURE — 99223 1ST HOSP IP/OBS HIGH 75: CPT | Performed by: PSYCHIATRY & NEUROLOGY

## 2021-04-19 PROCEDURE — 82947 ASSAY GLUCOSE BLOOD QUANT: CPT

## 2021-04-19 PROCEDURE — 80053 COMPREHEN METABOLIC PANEL: CPT

## 2021-04-19 PROCEDURE — 72125 CT NECK SPINE W/O DYE: CPT

## 2021-04-19 PROCEDURE — 85610 PROTHROMBIN TIME: CPT

## 2021-04-19 PROCEDURE — 71045 X-RAY EXAM CHEST 1 VIEW: CPT

## 2021-04-19 PROCEDURE — 87635 SARS-COV-2 COVID-19 AMP PRB: CPT

## 2021-04-19 PROCEDURE — 99221 1ST HOSP IP/OBS SF/LOW 40: CPT | Performed by: NEUROLOGICAL SURGERY

## 2021-04-19 PROCEDURE — 0HQ0XZZ REPAIR SCALP SKIN, EXTERNAL APPROACH: ICD-10-PCS | Performed by: EMERGENCY MEDICINE

## 2021-04-19 PROCEDURE — 84146 ASSAY OF PROLACTIN: CPT

## 2021-04-19 PROCEDURE — 36415 COLL VENOUS BLD VENIPUNCTURE: CPT

## 2021-04-19 PROCEDURE — 2000000000 HC ICU R&B

## 2021-04-19 RX ORDER — SODIUM CHLORIDE 9 MG/ML
INJECTION, SOLUTION INTRAVENOUS CONTINUOUS
Status: DISCONTINUED | OUTPATIENT
Start: 2021-04-19 | End: 2021-04-23 | Stop reason: HOSPADM

## 2021-04-19 RX ORDER — MECLIZINE HYDROCHLORIDE 25 MG/1
25 TABLET ORAL 3 TIMES DAILY PRN
Status: DISCONTINUED | OUTPATIENT
Start: 2021-04-19 | End: 2021-04-23 | Stop reason: HOSPADM

## 2021-04-19 RX ORDER — LEVETIRACETAM 10 MG/ML
1000 INJECTION INTRAVASCULAR ONCE
Status: COMPLETED | OUTPATIENT
Start: 2021-04-19 | End: 2021-04-19

## 2021-04-19 RX ORDER — ACETAMINOPHEN 650 MG/1
650 SUPPOSITORY RECTAL EVERY 6 HOURS PRN
Status: DISCONTINUED | OUTPATIENT
Start: 2021-04-19 | End: 2021-04-23 | Stop reason: HOSPADM

## 2021-04-19 RX ORDER — LORAZEPAM 2 MG/ML
2 INJECTION INTRAMUSCULAR EVERY 4 HOURS PRN
Status: DISCONTINUED | OUTPATIENT
Start: 2021-04-19 | End: 2021-04-23 | Stop reason: HOSPADM

## 2021-04-19 RX ORDER — CHOLESTYRAMINE LIGHT 4 G/5.7G
4 POWDER, FOR SUSPENSION ORAL DAILY
Status: DISCONTINUED | OUTPATIENT
Start: 2021-04-20 | End: 2021-04-23 | Stop reason: HOSPADM

## 2021-04-19 RX ORDER — IPRATROPIUM BROMIDE AND ALBUTEROL SULFATE 2.5; .5 MG/3ML; MG/3ML
1 SOLUTION RESPIRATORY (INHALATION) EVERY 4 HOURS PRN
Status: DISCONTINUED | OUTPATIENT
Start: 2021-04-19 | End: 2021-04-23 | Stop reason: HOSPADM

## 2021-04-19 RX ORDER — POTASSIUM CHLORIDE 7.45 MG/ML
10 INJECTION INTRAVENOUS PRN
Status: DISCONTINUED | OUTPATIENT
Start: 2021-04-19 | End: 2021-04-23 | Stop reason: HOSPADM

## 2021-04-19 RX ORDER — ACETAMINOPHEN 325 MG/1
650 TABLET ORAL EVERY 6 HOURS PRN
Status: DISCONTINUED | OUTPATIENT
Start: 2021-04-19 | End: 2021-04-23 | Stop reason: HOSPADM

## 2021-04-19 RX ORDER — LEVETIRACETAM 5 MG/ML
500 INJECTION INTRAVASCULAR EVERY 12 HOURS
Status: DISCONTINUED | OUTPATIENT
Start: 2021-04-19 | End: 2021-04-22

## 2021-04-19 RX ORDER — POTASSIUM CHLORIDE 20 MEQ/1
40 TABLET, EXTENDED RELEASE ORAL PRN
Status: DISCONTINUED | OUTPATIENT
Start: 2021-04-19 | End: 2021-04-23 | Stop reason: HOSPADM

## 2021-04-19 RX ORDER — SODIUM CHLORIDE 9 MG/ML
25 INJECTION, SOLUTION INTRAVENOUS PRN
Status: DISCONTINUED | OUTPATIENT
Start: 2021-04-19 | End: 2021-04-23 | Stop reason: HOSPADM

## 2021-04-19 RX ORDER — ONDANSETRON 2 MG/ML
4 INJECTION INTRAMUSCULAR; INTRAVENOUS EVERY 6 HOURS PRN
Status: DISCONTINUED | OUTPATIENT
Start: 2021-04-19 | End: 2021-04-23 | Stop reason: HOSPADM

## 2021-04-19 RX ORDER — QUETIAPINE FUMARATE 25 MG/1
25 TABLET, FILM COATED ORAL NIGHTLY
Status: DISCONTINUED | OUTPATIENT
Start: 2021-04-19 | End: 2021-04-23 | Stop reason: HOSPADM

## 2021-04-19 RX ORDER — SODIUM CHLORIDE 0.9 % (FLUSH) 0.9 %
5-40 SYRINGE (ML) INJECTION PRN
Status: DISCONTINUED | OUTPATIENT
Start: 2021-04-19 | End: 2021-04-23 | Stop reason: HOSPADM

## 2021-04-19 RX ORDER — ERGOCALCIFEROL 1.25 MG/1
50000 CAPSULE ORAL WEEKLY
Status: DISCONTINUED | OUTPATIENT
Start: 2021-04-19 | End: 2021-04-23 | Stop reason: HOSPADM

## 2021-04-19 RX ORDER — MAGNESIUM SULFATE IN WATER 40 MG/ML
2000 INJECTION, SOLUTION INTRAVENOUS PRN
Status: DISCONTINUED | OUTPATIENT
Start: 2021-04-19 | End: 2021-04-23 | Stop reason: HOSPADM

## 2021-04-19 RX ORDER — PROMETHAZINE HYDROCHLORIDE 25 MG/1
12.5 TABLET ORAL EVERY 6 HOURS PRN
Status: DISCONTINUED | OUTPATIENT
Start: 2021-04-19 | End: 2021-04-23 | Stop reason: HOSPADM

## 2021-04-19 RX ORDER — SODIUM CHLORIDE 0.9 % (FLUSH) 0.9 %
5-40 SYRINGE (ML) INJECTION EVERY 12 HOURS SCHEDULED
Status: DISCONTINUED | OUTPATIENT
Start: 2021-04-19 | End: 2021-04-23 | Stop reason: HOSPADM

## 2021-04-19 RX ORDER — ATORVASTATIN CALCIUM 40 MG/1
40 TABLET, FILM COATED ORAL NIGHTLY
Status: DISCONTINUED | OUTPATIENT
Start: 2021-04-19 | End: 2021-04-23 | Stop reason: HOSPADM

## 2021-04-19 RX ADMIN — ONDANSETRON HYDROCHLORIDE 4 MG: 2 SOLUTION INTRAMUSCULAR; INTRAVENOUS at 11:20

## 2021-04-19 RX ADMIN — SODIUM CHLORIDE: 9 INJECTION, SOLUTION INTRAVENOUS at 12:00

## 2021-04-19 RX ADMIN — LEVETIRACETAM 1000 MG: 10 INJECTION INTRAVENOUS at 10:25

## 2021-04-19 RX ADMIN — LEVETIRACETAM 500 MG: 5 INJECTION INTRAVENOUS at 21:45

## 2021-04-19 RX ADMIN — SODIUM CHLORIDE, PRESERVATIVE FREE 10 ML: 5 INJECTION INTRAVENOUS at 12:00

## 2021-04-19 RX ADMIN — SODIUM CHLORIDE, PRESERVATIVE FREE 10 ML: 5 INJECTION INTRAVENOUS at 21:45

## 2021-04-19 ASSESSMENT — PAIN SCALES - GENERAL
PAINLEVEL_OUTOF10: 0

## 2021-04-19 NOTE — ED PROVIDER NOTES
[facility] ICU  EMERGENCY DEPARTMENT ENCOUNTER      Pt Name: Valentin Crain  MRN: [id number]  [place] 1945  Date of evaluation: 4/19/2021  Provider: Bishop Carbone MD    CHIEF COMPLAINT       Chief Complaint   Patient presents with   Chava Mote     presents past fall, laceration to back of head          HISTORY OF PRESENT ILLNESS   (Location/Symptom, Timing/Onset,Context/Setting, Quality, Duration, Modifying Factors, Severity)  Note limiting factors. Valentin Crain is a 67 y.o. female who presents to the emergency department for evaluation after reported loss of consciousness and fall. Patient has advanced dementia according to medical records and is in assisted living. Staff there reported that they saw her appear to lose consciousness while walking and fall backwards and hit her head. They did report witnessed seizure-like activity. EMS states on their arrival patient was still confused with garbled speech and has been intermittently combative with them throughout transportation here. They noted slight pupil asymmetry as well with the right pupil slightly larger than the left. Did not identify any specific focal neurologic deficits otherwise though. By report patient has had improvement in mental status by the time of arrival here. Patient currently denies any complaints. Denies any pain or injuries from the fall. EMS also noted that patient seemed to have irregular heart rhythm on the monitor here but was difficult to interpret given patient's uncooperative nature    HPI    NursingNotes were reviewed. REVIEW OF SYSTEMS    (2-9 systems for level 4, 10 or more for level 5)     Review of Systems   Unable to perform ROS: Dementia       A complete review of systems was performed and is negative except as noted above in the HPI.        PAST MEDICAL HISTORY     Past Medical History:   Diagnosis Date    ADD (attention deficit disorder)     Carotid atherosclerosis     Degeneration of cervical intervertebral disc  Mixed hyperlipidemia     Nontoxic multinodular goiter     OA (osteoarthritis)     Osteopenia     Panic disorder without agoraphobia     Peripheral neuropathy     Pure hypercholesterolemia     Urinary incontinence     Vitamin D deficiency          SURGICAL HISTORY       Past Surgical History:   Procedure Laterality Date    COLONOSCOPY      HYSTERECTOMY, TOTAL ABDOMINAL      w/removal of both ovaries    JOINT REPLACEMENT      knee    TOTAL KNEE ARTHROPLASTY Right 2011    TOTAL KNEE ARTHROPLASTY Left 2012    VAGINA SURGERY      Abdomino-Vaginal Vesical Neck Suspension         CURRENT MEDICATIONS       Current Discharge Medication List      CONTINUE these medications which have NOT CHANGED    Details   meclizine (ANTIVERT) 25 MG tablet TAKE 1 TABLET BY MOUTH THREE TIMES DAILY AS NEEDED DIZZINESS  Qty: 90 tablet, Refills: 0      dextromethorphan-quiNIDine (NUEDEXTA) 20-10 MG CAPS per capsule Take 1 daily for a week then 1 twice a day. Qty: 60 capsule, Refills: 5      cyanocobalamin 1000 MCG/ML injection Inject 1 mL into the muscle every 30 days  Qty: 1 mL, Refills: 5    Comments: Please give syringe kit along with this  Associated Diagnoses: B12 deficiency      donepezil (ARICEPT) 10 MG tablet TAKE 1 TABLET BY MOUTH ONCE AT NIGHT  Qty: 30 tablet, Refills: 11      QUEtiapine (SEROQUEL) 25 MG tablet TAKE 1 OR 2 TABLETS BY MOUTH EACH EVENING  Qty: 60 tablet, Refills: 5      vitamin D (ERGOCALCIFEROL) 1.25 MG (67749 UT) CAPS capsule Take 1 capsule by mouth once a week  Qty: 12 capsule, Refills: 1      atorvastatin (LIPITOR) 20 MG tablet Take 1 tablet by mouth once daily  Qty: 90 tablet, Refills: 1      Memantine HCl ER 7 & 14 & 21 &28 MG CP24 Take 1 capsule by mouth daily (with breakfast)  Qty: 28 capsule, Refills: 0      colestipol (COLESTID) 1 g tablet Take 1 tablet by mouth daily  Qty: 30 tablet, Refills: 3      clotrimazole-betamethasone (LOTRISONE) 1-0.05 % cream Apply topically 2 times daily.   Qty: 45 g, Refills: 1      cetirizine (ZYRTEC) 10 MG tablet Take 10 mg by mouth daily             ALLERGIES     Latex, Alcohol, Astelin [azelastine hcl], Ciprofloxacin hcl, Fluticasone propionate, Hydroquinone, Livalo [pitavastatin], Other, and Vitamin b complex [b-100]    FAMILY HISTORY       Family History   Problem Relation Age of Onset    Cancer Mother         Hodgkins Lymphoma, AML    Breast Cancer Mother     Heart Disease Other           SOCIAL HISTORY       Social History     Socioeconomic History    Marital status:      Spouse name: None    Number of children: None    Years of education: None    Highest education level: None   Occupational History    None   Social Needs    Financial resource strain: None    Food insecurity     Worry: None     Inability: None    Transportation needs     Medical: None     Non-medical: None   Tobacco Use    Smoking status: Former Smoker     Packs/day: 2.50     Years: 5.00     Pack years: 12.50     Quit date:      Years since quittin.3    Smokeless tobacco: Never Used   Substance and Sexual Activity    Alcohol use: Never     Frequency: Never    Drug use: Never    Sexual activity: None   Lifestyle    Physical activity     Days per week: None     Minutes per session: None    Stress: None   Relationships    Social connections     Talks on phone: None     Gets together: None     Attends Holiness service: None     Active member of club or organization: None     Attends meetings of clubs or organizations: None     Relationship status: None    Intimate partner violence     Fear of current or ex partner: None     Emotionally abused: None     Physically abused: None     Forced sexual activity: None   Other Topics Concern    None   Social History Narrative    None       SCREENINGS   NIH Stroke Scale  Interval: Reassessment  Level of Consciousness (1a. ): Not alert, but arousable by minor stimulation to obey  LOC Questions (1b. ):  Answers one correctly Skin:     General: Skin is warm and dry. Neurological:      General: No focal deficit present. Mental Status: She is alert. GCS: GCS eye subscore is 4. GCS verbal subscore is 4. GCS motor subscore is 6. Motor: No abnormal muscle tone. Comments: Slight, less than 1 mm asymmetry in pupils with right slightly greater than left but both reactive. Limited participation with neurologic exam, though no clear focal or lateralizing neurologic deficits. Seems to move all extremities equally. Patient is confused but follows commands. Psychiatric:         Behavior: Behavior normal.         Thought Content: Thought content normal.         Judgment: Judgment normal.         DIAGNOSTIC RESULTS     EKG: All EKG's are interpreted by the Emergency Department Physician who either signs or Co-signs this chart in the absence of a cardiologist.        RADIOLOGY:   Non-plain film images such as CT, Ultrasound and MRI are read by the radiologist. Cipriano Gauze images are visualized and preliminarily interpreted by the emergency physician with the below findings:        Interpretation per the Radiologist below, if available at the time of this note:    CT Cervical Spine WO Contrast   Final Result   1. Reversal of normal cervical lordosis. There is anterolisthesis of   C3 on C4 felt to be arthritic in nature. 2. No evidence of acute fracture. Degenerative disc disease with disc   osteophyte complex is noted at several levels in the mid and lower   cervical spine as described above. Bony neural foraminal encroachment   is also noted related to facet and uncovertebral hypertrophy. .   Signed by Dr Juan Gardiner on 4/19/2021 9:39 AM      CT Head WO Contrast   Final Result   1.. Left frontal hemorrhage including a small amount of blood within   the anterior interhemispheric fissure within the subdural space as   well as subarachnoid hemorrhage overlying the left frontal cortex.    Some contusional hemorrhage of the left frontal cortex is also   suspected. Also demonstrated is subarachnoid hemorrhage high within   the right parietal vertex. There is no associated mass effect or shift   of the midline. 2. Atrophy with small vessel ischemic disease. 3. I spoke with Dr. Agueda Patricia in the ER at 9:30 AM concerning the   findings of hemorrhage within the right parietal vertex and left   frontal lobe. Signed by Dr Geeta Montalvo on 4/19/2021 9:34 AM      XR CHEST PORTABLE   Final Result   1. Chronic interstitial lung disease. 2. No acute abnormality. Signed by Dr Jovanny Maurer on 4/19/2021 9:19 AM      XR PELVIS (1-2 VIEWS)   Final Result   . No evidence of acute displaced fracture.    Signed by Dr Geeta Montalvo on 4/19/2021 9:26 AM      CT HEAD WO CONTRAST    (Results Pending)         ED BEDSIDE ULTRASOUND:   Performed by ED Physician - none    LABS:  Labs Reviewed   CBC WITH AUTO DIFFERENTIAL - Abnormal; Notable for the following components:       Result Value    MCHC 32.6 (*)     Neutrophils % 75.4 (*)     Lymphocytes % 17.9 (*)     All other components within normal limits   COMPREHENSIVE METABOLIC PANEL W/ REFLEX TO MG FOR LOW K - Abnormal; Notable for the following components:    Glucose 153 (*)     Alkaline Phosphatase 109 (*)     All other components within normal limits   URINE RT REFLEX TO CULTURE - Abnormal; Notable for the following components:    Clarity, UA TURBID (*)     Blood, Urine TRACE (*)     Protein, UA TRACE (*)     All other components within normal limits   MICROSCOPIC URINALYSIS - Abnormal; Notable for the following components:    Bacteria, UA NEGATIVE (*)     Crystals, UA NEG (*)     All other components within normal limits   POCT GLUCOSE - Abnormal; Notable for the following components:    POC Glucose 132 (*)     All other components within normal limits   COVID-19, RAPID   TROPONIN   PROTIME-INR   APTT   APTT   PROLACTIN   POCT GLUCOSE   POCT GLUCOSE   POCT GLUCOSE       All other labs were within normal range or not returned as of this dictation.     Medications   atorvastatin (LIPITOR) tablet 40 mg (has no administration in time range)   cholestyramine light packet 4 g (has no administration in time range)   dextromethorphan-quiNIDine (NUEDEXTA) 20-10 MG per capsule 1 capsule ( Oral Automatically Held 4/23/21 0900)   meclizine (ANTIVERT) tablet 25 mg ( Oral Held by provider 4/19/21 1141)   QUEtiapine (SEROQUEL) tablet 25 mg ( Oral Automatically Held 4/22/21 2100)   vitamin D (ERGOCALCIFEROL) capsule 50,000 Units (has no administration in time range)   levETIRAcetam (KEPPRA) 500 mg/100 mL IVPB (has no administration in time range)   LORazepam (ATIVAN) injection 2 mg (has no administration in time range)   sodium chloride flush 0.9 % injection 5-40 mL (10 mLs Intravenous Given 4/19/21 1200)   sodium chloride flush 0.9 % injection 5-40 mL (has no administration in time range)   0.9 % sodium chloride infusion (has no administration in time range)   promethazine (PHENERGAN) tablet 12.5 mg ( Oral See Alternative 4/19/21 1120)     Or   ondansetron (ZOFRAN) injection 4 mg (4 mg Intravenous Given 4/19/21 1120)   acetaminophen (TYLENOL) tablet 650 mg (has no administration in time range)     Or   acetaminophen (TYLENOL) suppository 650 mg (has no administration in time range)   0.9 % sodium chloride infusion ( Intravenous New Bag 4/19/21 1200)   potassium chloride (KLOR-CON M) extended release tablet 40 mEq (has no administration in time range)     Or   potassium bicarb-citric acid (EFFER-K) effervescent tablet 40 mEq (has no administration in time range)     Or   potassium chloride 10 mEq/100 mL IVPB (Peripheral Line) (has no administration in time range)   magnesium sulfate 2000 mg in 50 mL IVPB premix (has no administration in time range)   ipratropium-albuterol (DUONEB) nebulizer solution 1 ampule (has no administration in time range)   levETIRAcetam (KEPPRA) 1000 mg/100 mL IVPB (0 mg Intravenous Stopped 4/19/21 1100)       EMERGENCY DEPARTMENT COURSE and DIFFERENTIALDIAGNOSIS/MDM:   Vitals:    Vitals:    04/19/21 0846 04/19/21 1139 04/19/21 1200   BP: (!) 144/82 (!) 147/64 (!) 144/52   Pulse: 86 67 59   Resp: 18 16 23   Temp: 98.2 °F (36.8 °C) 97.5 °F (36.4 °C)    TempSrc:  Temporal    SpO2: 96% 91%    Weight: 130 lb (59 kg)     Height: 5' 5\" (1.651 m)         MDM    ED Course as of Apr 19 1439   Mon Apr 19, 2021   0933 Head CT does show with couple areas of intracranial hemorrhage withdrawal.  Consistent with post traumatic injury. No evidence of a spontaneous intracranial hemorrhage/ruptured aneurysm, or evidence of precipitating ischemic stroke. [EZ]   [id number] Contacted neurosurgery who is reviewing images. [EZ]   1051 Normal sinus rhythm with no findings of acute ischemia or infarction. Normal QT interval with no signs of Jeffry-Parkinson-White, Brugada, or other syndromic EKG changes that would predispose to cardiac etiology of syncope. [EZ]      ED Course User Index  [EZ] Dary Cordoba MD     Patient does seem to have returned to baseline mental status here in the emergency department in comparison to previous notes and records. Cardiac work-up shows no abnormalities to explain patient's loss of consciousness here. Unclear whether patient had a syncope or initial seizure preceding the fall, or if the witnessed seizure-like activity was after the head injury and more secondary to the trauma. Based on the evaluation and work-up here patient is felt to require further monitoring, work-up, or treatment that is available in the emergency department. Case was discussed with hospitalist who agrees for observation or admission for further management. Treatment and stabilization as necessary were provided in the emergency department prior to transfer of care to the medicine service.         CONSULTS:  IP CONSULT TO NEUROSURGERY  IP CONSULT TO NEUROLOGY    PROCEDURES:  Unless otherwise notedbelow, none     CRITICAL CARE TIME   Total Critical Care time was 35 minutes, excluding separately reportable procedures. There was a high probability of clinically significant/life threatening deterioration in the patient's condition which required my urgent intervention. Lac Repair    Date/Time: 4/19/2021 10:11 AM  Performed by: Carla Potter MD  Authorized by: Carla Potter MD     Consent:     Consent obtained:  Verbal    Consent given by:  Patient    Risks discussed:  Poor wound healing and pain    Alternatives discussed:  No treatment  Anesthesia (see MAR for exact dosages): Anesthesia method:  None  Laceration details:     Location:  Scalp    Scalp location:  Occipital    Length (cm):  3  Repair type:     Repair type:  Simple  Exploration:     Hemostasis achieved with:  Direct pressure    Wound exploration: wound explored through full range of motion and entire depth of wound probed and visualized      Wound extent: no fascia violation noted, no foreign bodies/material noted, no muscle damage noted, no nerve damage noted, no tendon damage noted, no underlying fracture noted and no vascular damage noted      Contaminated: no    Skin repair:     Repair method:  Staples    Number of staples:  2  Approximation:     Approximation:  Close  Post-procedure details:     Dressing:  Open (no dressing)    Patient tolerance of procedure: Tolerated well, no immediate complications          FINAL IMPRESSION     1. Fall from standing, initial encounter    2. Loss of consciousness ([address]    3. Closed head injury, initial encounter    4. Traumatic subdural hemorrhage with loss of consciousness of 30 minutes or less, initial encounter ([address]    5. Traumatic subarachnoid hemorrhage with loss of consciousness of 30 minutes or less, initial encounter ([address]    6.  Dementia with behavioral disturbance, unspecified dementia type ([address]    7. Laceration of scalp, initial encounter          DISPOSITION/PLAN

## 2021-04-19 NOTE — ED NOTES
Bed: 07  Expected date:   Expected time:   Means of arrival:   Comments:  sylvia Ortez, BRENDA  04/19/21 7808

## 2021-04-19 NOTE — H&P
Hospitalist: History and Physical    Date: 2021 Time: 10:43 AM    Name: Griselda Paz : 1945 MRN: [id number]    Code Status: Full Code No additional code details    PCP: Comfort Cooper MD    Patient's Chief Complaint Is: Fall    HPI: Patient is a 67 y.o. female with dementia residing at [facility], anxiety, chronic vertigo, presents to the ED after apparent fall with LOC and head trauma. [facility] staff witnessed seizure-like activity. Unknown amount of time that patient was unconscious. Per EMS, patient remained confused with incomprehensible speech and intermittently combative when they arrived. EMS also reported concern for pupil asymmetry with right pupil slightly larger than the left. History otherwise limited due to patient's baseline mental status. Following arrival to the ED, patient with AMS with some confusion but otherwise no further agitation. Patient noted to have scalp laceration on back of head. Work-up included EKG which showed sinus rhythm, trauma imaging including CT spine and pelvic x-ray without any fractures. Chest x-ray shows chronic interstitial disease but no acute findings otherwise. CT head significant for left frontal hemorrhage noted. Patient does not appear to be on any anticoagulants or antiplatelet therapy on review of home med list.  Neurosurgery consulted from ED. Patient loaded with Keppra.         Past Medical History:   Diagnosis Date    ADD (attention deficit disorder)     Carotid atherosclerosis     Degeneration of cervical intervertebral disc     Mixed hyperlipidemia     Nontoxic multinodular goiter     OA (osteoarthritis)     Osteopenia     Panic disorder without agoraphobia     Peripheral neuropathy     Pure hypercholesterolemia     Urinary incontinence     Vitamin D deficiency      Past Surgical History:   Procedure Laterality Date    COLONOSCOPY      HYSTERECTOMY, TOTAL ABDOMINAL      w/removal of both ovaries    JOINT REPLACEMENT      knee    TOTAL KNEE ARTHROPLASTY Right 2011    TOTAL KNEE ARTHROPLASTY Left 2012    VAGINA SURGERY      Abdomino-Vaginal Vesical Neck Suspension     Family History   Problem Relation Age of Onset    Cancer Mother         Hodgkins Lymphoma, AML    Breast Cancer Mother     Heart Disease Other      Social History     Socioeconomic History    Marital status:      Spouse name: Not on file    Number of children: Not on file    Years of education: Not on file    Highest education level: Not on file   Occupational History    Not on file   Social Needs    Financial resource strain: Not on file    Food insecurity     Worry: Not on file     Inability: Not on file    Transportation needs     Medical: Not on file     Non-medical: Not on file   Tobacco Use    Smoking status: Former Smoker     Packs/day: 2.50     Years: 5.00     Pack years: 12.50     Quit date:      Years since quittin.3    Smokeless tobacco: Never Used   Substance and Sexual Activity    Alcohol use: Never     Frequency: Never    Drug use: Never    Sexual activity: Not on file   Lifestyle    Physical activity     Days per week: Not on file     Minutes per session: Not on file    Stress: Not on file   Relationships    Social connections     Talks on phone: Not on file     Gets together: Not on file     Attends Jew service: Not on file     Active member of club or organization: Not on file     Attends meetings of clubs or organizations: Not on file     Relationship status: Not on file    Intimate partner violence     Fear of current or ex partner: Not on file     Emotionally abused: Not on file     Physically abused: Not on file     Forced sexual activity: Not on file   Other Topics Concern    Not on file   Social History Narrative    Not on file     Allergies   Allergen Reactions    Latex     Alcohol      liquid  liquid    Astelin [Azelastine Hcl]      solution  solution    Ciprofloxacin Hcl Swelling    Fluticasone Propionate suspension  suspension    Hydroquinone      cream    Livalo [Pitavastatin]      tablets    Other      seafood  seafood    Vitamin B Complex [B-100]      Vit B 3 tablets  Vit B 3 tablets     Prior to Admission medications    Medication Sig Start Date End Date Taking? Authorizing Provider   meclizine (ANTIVERT) 25 MG tablet TAKE 1 TABLET BY MOUTH THREE TIMES DAILY AS NEEDED DIZZINESS 3/1/21   Marla Thayer MD   dextromethorphan-quiNIDine (NUEDEXTA) 20-10 MG CAPS per capsule Take 1 daily for a week then 1 twice a day. 3/1/21   Marcelina Scott MD   cyanocobalamin 1000 MCG/ML injection Inject 1 mL into the muscle every 30 days 2/24/21   Marla Thayer MD   donepezil (ARICEPT) 10 MG tablet TAKE 1 TABLET BY MOUTH ONCE AT NIGHT 11/23/20   Chastity Tran DO   QUEtiapine (SEROQUEL) 25 MG tablet TAKE 1 OR 2 TABLETS BY MOUTH EACH EVENING 7/22/20   Marcelina Scott MD   vitamin D (ERGOCALCIFEROL) 1.25 MG (67344 UT) CAPS capsule Take 1 capsule by mouth once a week 6/24/20   Marla Thayer MD   atorvastatin (LIPITOR) 20 MG tablet Take 1 tablet by mouth once daily 6/23/20   Marla Thayer MD   Memantine HCl ER 7 & 14 & 21 &28 MG CP24 Take 1 capsule by mouth daily (with breakfast) 5/7/20   Marcelina Scott MD   colestipol (COLESTID) 1 g tablet Take 1 tablet by mouth daily 12/20/19   Marla Thayer MD   clotrimazole-betamethasone (LOTRISONE) 1-0.05 % cream Apply topically 2 times daily. 11/22/19   Flaquita Scruggs MD   cetirizine (ZYRTEC) 10 MG tablet Take 10 mg by mouth daily    Historical Provider, MD DIAZ have reviewed all pertinent history. Prior medical records and laboratory evaluation reviewed. Imaging independently reviewed.     Review of Systems:   Unable to complete comprehensive ROS due to patient's baseline mental status/dementia    Physical Exam:  Vitals:    04/19/21 0846   BP: (!) 144/82   Pulse: 86   Resp: 18   Temp: 98.2 °F (36.8 °C)   SpO2: 96%     CONSTITUTIONAL: Elderly, pleasantly confused, no distress  PSYCH: Disoriented to time   EYES: Pupils equal round reactive to light  Head: Posterior scalp laceration  NECK: Trachea is midline. Nontender  Throat: Oropharynx clear  LUNGS: Coarse breath sounds bilaterally  CARDIOVASCULAR: Regular rate and rhythm,  Pulses are equal bilaterally. GI/ABDOMEN: Soft, non-tender, non-distended, no guarding or rebound. Bowel sounds are normal.  SKIN: Warm and dry.   Posterior scalp laceration noted  NEUROLOGICAL: No focal weakness appreciated  EXTREMITIES: No clubbing cyanosis or edema    Labs:   Recent Results (from the past 72 hour(s))   CBC Auto Differential    Collection Time: 04/19/21  9:32 AM   Result Value Ref Range    WBC 7.8 4.8 - 10.8 K/uL    RBC 4.91 4.20 - 5.40 M/uL    Hemoglobin 14.8 12.0 - 16.0 g/dL    Hematocrit 45.4 37.0 - 47.0 %    MCV 92.5 81.0 - 99.0 fL    MCH 30.1 27.0 - 31.0 pg    MCHC 32.6 (L) 33.0 - 37.0 g/dL    RDW 13.6 11.5 - 14.5 %    Platelets 427 392 - 640 K/uL    MPV 11.8 9.4 - 12.3 fL    Neutrophils % 75.4 (H) 50.0 - 65.0 %    Lymphocytes % 17.9 (L) 20.0 - 40.0 %    Monocytes % 4.5 0.0 - 10.0 %    Eosinophils % 0.6 0.0 - 5.0 %    Basophils % 0.8 0.0 - 1.0 %    Neutrophils Absolute 5.9 1.5 - 7.5 K/uL    Immature Granulocytes # 0.1 K/uL    Lymphocytes Absolute 1.4 1.1 - 4.5 K/uL    Monocytes Absolute 0.40 0.00 - 0.90 K/uL    Eosinophils Absolute 0.10 0.00 - 0.60 K/uL    Basophils Absolute 0.10 0.00 - 0.20 K/uL   Comprehensive Metabolic Panel w/ Reflex to MG    Collection Time: 04/19/21  9:32 AM   Result Value Ref Range    Sodium 142 136 - 145 mmol/L    Potassium reflex Magnesium 4.4 3.5 - 5.0 mmol/L    Chloride 104 98 - 111 mmol/L    CO2 27 22 - 29 mmol/L    Anion Gap 11 7 - 19 mmol/L    Glucose 153 (H) 74 - 109 mg/dL    BUN 10 8 - 23 mg/dL    CREATININE 0.5 0.5 - 0.9 mg/dL    GFR Non-African American >60 >60    GFR African American >59 >59    Calcium 9.4 8.8 - 10.2 mg/dL    Total Protein 6.9 6.6 - 8.7 g/dL    Albumin 4.4 3.5 - 5.2 g/dL    Total Bilirubin 0.8 0.2 - 1.2 mg/dL Alkaline Phosphatase 109 (H) 35 - 104 U/L    ALT 23 5 - 33 U/L    AST 32 5 - 32 U/L   Troponin    Collection Time: 04/19/21  9:32 AM   Result Value Ref Range    Troponin <0.01 0.00 - 0.03 ng/mL   Protime-INR    Collection Time: 04/19/21  9:32 AM   Result Value Ref Range    Protime 12.9 12.0 - 14.6 sec    INR 0.98 0.88 - 1.18   APTT    Collection Time: 04/19/21  9:32 AM   Result Value Ref Range    aPTT 27.3 26.0 - 36.2 sec       Imaging: Xr Pelvis (1-2 Views)    Result Date: 4/19/2021  EXAMINATION: Pelvis one view 4/19/2021 HISTORY: Fall. FINDINGS: AP radiograph of the pelvis demonstrates no evidence of acute displaced fracture. Both femoral heads are concentric and well located within the acetabulum. The SI joints and pubic symphysis are intact with no evidence of diastases. There is arthritic change including degenerative disc disease and endplate spurring throughout the lumbar spine. . No evidence of acute displaced fracture. Signed by Dr Saint Mays on 4/19/2021 9:26 AM    Ct Head Wo Contrast    Result Date: 4/19/2021  EXAMINATION: CT head without contrast 4/19/2021 HISTORY: Posterior head injury. Question seizure. FINDINGS: Multiple contiguous axial views are obtained from the skull base to the vertex per protocol without intravenous contrast enhancement with reformatted images obtained in the sagittal and coronal projections from the original data set. There is atrophy of the brain with prominence of the subarachnoid spaces and ventricular enlargement. Small vessel ischemic disease is noted involving the periventricular and higher white matter tracts. On images through the right parietal vertex there is evidence of subarachnoid hemorrhage which is demonstrated on images 24 through 26 of the axial sequence.  There is also left frontal hemorrhage including what I suspect to be predominantly subarachnoid hemorrhage overlying the left frontal cortex as well as a small amount of blood within the anterior interhemispheric fissure within the subdural space measuring 5 mm in thickness. There is no associated mass effect. There is no associated calvarial injury. There is no mass effect or shift of the midline. No evidence of acute infarct. 1.. Left frontal hemorrhage including a small amount of blood within the anterior interhemispheric fissure within the subdural space as well as subarachnoid hemorrhage overlying the left frontal cortex. Some contusional hemorrhage of the left frontal cortex is also suspected. Also demonstrated is subarachnoid hemorrhage high within the right parietal vertex. There is no associated mass effect or shift of the midline. 2. Atrophy with small vessel ischemic disease. 3. I spoke with Dr. Rafa Arriaga in the ER at 9:30 AM concerning the findings of hemorrhage within the right parietal vertex and left frontal lobe. Signed by Dr Edilia Pompa on 4/19/2021 9:34 AM    Ct Cervical Spine Wo Contrast    Result Date: 4/19/2021  CT CERVICAL SPINE WO CONTRAST 4/19/2021 8:23 AM HISTORY: Fall with head injury. COMPARISON: None DOSE LENGTH PRODUCT: 361 mGy cm. Automated exposure control was utilized to diminish patient radiation dose. TECHNIQUE: Serial helical tomographic images of the cervical spine were obtained without the use of intravenous contrast. Additionally, sagittal and coronal reformatted images were also provided for review. FINDINGS: There is no evidence of fracture. There is reversal of the normal cervical lordosis with 3 mm of anterolisthesis of C3 on C4-likely arthritic in nature. . The atlantooccipital and atlantoaxial articulations are intact. Likewise, the dens is unremarkable. Vertebral body height and alignment are well maintained. Degenerative disc disease is noted at C4-C5, C5-C6 and C6-C7 with narrowing of the disc space height and disc osteophyte complex. . Bony neural foraminal encroachment is noted at multiple levels related to facet and uncovertebral hypertrophy.  There is also disc osteophyte complex at C4-C5, C5-C6 and C6-C7 with mild ventral indentation of the thecal sac but without evidence of significant central stenosis. . There is no evidence of facet lock or perch. The posterior elements are intact. The visualized soft tissues are unremarkable. The visualized thorax demonstrates no acute abnormality. 1. Reversal of normal cervical lordosis. There is anterolisthesis of C3 on C4 felt to be arthritic in nature. 2. No evidence of acute fracture. Degenerative disc disease with disc osteophyte complex is noted at several levels in the mid and lower cervical spine as described above. Bony neural foraminal encroachment is also noted related to facet and uncovertebral hypertrophy. . Signed by Dr Shabana Ortega on 4/19/2021 9:39 AM    Xr Chest Portable    Result Date: 4/19/2021  XR CHEST PORTABLE 4/19/2021 9:18 AM History: Syncope. Fall injury. One view chest x-ray compared with 8 October 2012. Normal heart size. Mildly prominent aortic arch with moderate calcification. Diffuse interstitial lung disease with a few granulomas. No pneumonia, pneumothorax, or heart failure. 1. Chronic interstitial lung disease. 2. No acute abnormality. Signed by Dr Salomón Cantu on 4/19/2021 9:19 AM      Assessment/Plan:     Principal Problem:    Punctate hemorrhage of left frontal lobe (HCC)  Active Problems:    AMS (altered mental status)    Witnessed seizure-like activity (HCC)    Carotid artery stenosis    Late onset Alzheimer's disease without behavioral disturbance (HCC)    LOC (loss of consciousness) ([facility] [address]  Resolved Problems:    * No resolved hospital problems.  *       Traumatic subarachnoid hemorrhage  -  no surgical intervention per neurosurgery  Witnessed seizure-like activity  Neurosurgery and neurology consulted  Loaded with Keppra  Continue maintenance AED therapy as directed by neurology  As needed Ativan for any further seizures  Scheduled neurochecks  Monitor telemetry  EEG  MRI brain  Repeat CT head without contrast tomorrow a.m. or as directed by neurosurgery  Repeat CT head for any significant change in neuro or mental status    N.p.o. for now  Home meds reconciled  Limit sedating meds as able    PT/OT/Speech evaluations    DVT prophylaxis- SCDs    Debbie Dalton  4/19/2021 10:43 AM

## 2021-04-19 NOTE — CONSULTS
Medications in Hospital:      Current Facility-Administered Medications:     atorvastatin (LIPITOR) tablet 40 mg, 40 mg, Oral, Nightly, MD Manuel Brasher ON 4/20/2021] cholestyramine light packet 4 g, 4 g, Oral, Daily, Leslie Greene MD    [Held by provider] dextromethorphan-quiNIDine (NUEDEXTA) 20-10 MG per capsule 1 capsule, 1 capsule, Oral, Daily, [facility] by [facility]] meclizine (ANTIVERT) tablet 25 mg, 25 mg, Oral, TID PRN, MD [facility] by provider] QUEtiapine (SEROQUEL) tablet 25 mg, 25 mg, Oral, Nightly, Lselie Greene MD    vitamin D (ERGOCALCIFEROL) capsule 50,000 Units, 50,000 Units, Oral, Weekly, Leslie Greene MD    levETIRAcetam (KEPPRA) 500 mg/100 mL IVPB, 500 mg, Intravenous, Q12H, Leslie Greene MD    LORazepam (ATIVAN) injection 2 mg, 2 mg, Intravenous, Q4H PRN, Leslie Greene MD    sodium chloride flush 0.9 % injection 5-40 mL, 5-40 mL, Intravenous, 2 times per day, Leslie Greene MD    sodium chloride flush 0.9 % injection 5-40 mL, 5-40 mL, Intravenous, PRN, Leslie Greene MD    0.9 % sodium chloride infusion, 25 mL, Intravenous, PRN, Leslie Greene MD    promethazine (PHENERGAN) tablet 12.5 mg, 12.5 mg, Oral, Q6H PRN **OR** ondansetron (ZOFRAN) injection 4 mg, 4 mg, Intravenous, Q6H PRN, Leslie Greene MD, 4 mg at 04/19/21 1120    acetaminophen (TYLENOL) tablet 650 mg, 650 mg, Oral, Q6H PRN **OR** acetaminophen (TYLENOL) suppository 650 mg, 650 mg, Rectal, Q6H PRN, Leslie Greene MD    0.9 % sodium chloride infusion, , Intravenous, Continuous, Leslie Greene MD    potassium chloride (KLOR-CON M) extended release tablet 40 mEq, 40 mEq, Oral, PRN **OR** potassium bicarb-citric acid (EFFER-K) effervescent tablet 40 mEq, 40 mEq, Oral, PRN **OR** potassium chloride 10 mEq/100 mL IVPB (Peripheral Line), 10 mEq, Intravenous, PRN, Leslie Greene MD    magnesium sulfate 2000 mg in 50 mL IVPB premix, 2,000 mg, Intravenous, PRN, Jaky Muir MD    ipratropium-albuterol (DUONEB) nebulizer solution 1 ampule, 1 ampule, Inhalation, Q4H PRN, Jaky Muir MD    Allergies:  Latex, Alcohol, Astelin [azelastine hcl], Ciprofloxacin hcl, Fluticasone propionate, Hydroquinone, Livalo [pitavastatin], Other, and Vitamin b complex [b-100]    Social History:   TOBACCO:   reports that she quit smoking about 31 years ago. She has a 12.50 pack-year smoking history. She has never used smokeless tobacco.  ETOH:   reports no history of alcohol use. Family History:       Problem Relation Age of Onset   Lacy Yarbrough Cancer Mother         Hodgkins Lymphoma, AML    Breast Cancer Mother     Heart Disease Other            Physical Exam:    Vitals: BP (!) 144/52   Pulse 59   Temp 97.5 °F (36.4 °C) (Temporal)   Resp 23   Ht 5' 5\" (1.651 m)   Wt 130 lb (59 kg)   SpO2 91%   BMI 21.63 kg/m²     Constitutional  well developed, well nourished. Eyes  conjunctiva normal.  Pupils not tested  Ear, nose, throat -hearing  Difficult to assess No scars, masses, or lesions over external nose or ears, no atrophy of tongue  Neck-symmetric, no masses noted, no jugular vein distension  Respiration- chest wall appears symmetric, good expansion,   normal effort without use of accessory muscles  Musculoskeletal  no significant wasting of muscles noted, no bony deformities  Extremities-no clubbing, cyanosis or edema  Skin  warm, dry, and intact. No rash, erythema, or pallor.   Psychiatric  mood, affect, and behavior difficult to assess    Neurological exam  Somnolent awakens to voice hypophonic slow oriented to self and [place]  Falls asleep easily  Not follow commands   Keeps arms held up minimally when held up passively  Sensation unreliable   No tremors in hands or head noted  Finger to nose-unremarkable         CBC:   Recent Labs     04/19/21  0932   WBC 7.8   HGB 14.8          BMP:    Recent Labs     04/19/21  0932      K 4.4    CO2 27   BUN 10   CREATININE 0.5   GLUCOSE 153*       Hepatic:   Recent Labs     04/19/21  0932   AST 32   ALT 23   BILITOT 0.8   ALKPHOS 109*       Lipids: No results for input(s): CHOL, HDL in the last 72 hours. Invalid input(s): LDLCALCU    INR:   Recent Labs     04/19/21  0932   INR 0.98           Assessment and Plan     Intracerebral/subarahnoid hemorrhage post fall with possible seizure  Patient with advance dementia-unclear baseline     Currently somnolent and awakens a bit to voice and stimulation  Orentied to self and [place] but not following commands  Resting with arms folded    Non surgical hemorrhage  ICU monitoring with neuro checks  Repeat CT in am  EEG today  On Keppra 500 mg IV Q 12 for seizure prophylaxis   BP ok  DVT prophylaxis-SCDs  On NS IVF-speech to assess swallowing   PT/OT    Supportive care    Please feel free to call with any questions   [phone] (cell phone)    Dr Chauncey Holman certified in Neurology  Board Certified in [place] [address] Neurophysiology  Fellowship Trained in [place] [address] Neurophysiology    EMR Dragon/transcription disclaimer:Significant part of this  encounter note is electronic transcription/translation of spoken language to printed text. The electronic translation of spoken language may be erroneous, or at times, nonsensical words or phrases may be inadvertently transcribed.  Although I have reviewed the note for such errors, some may still exist.

## 2021-04-19 NOTE — PROGRESS NOTES
Received patient from ER. Report from [facility]. Patient's sister in law in waiting area.

## 2021-04-19 NOTE — PROGRESS NOTES
penetration/aspiration was noted with any ice chip trial, puree consistency trial, or thin H2O trial presented during evaluation this date. Did not observe swallow function with any additional consistency secondary to noted lethargy. At this time, would recommend NPO status. Essential meds crushed in pudding/applesauce. If patient receives mouth care prior to intake, okay for ice chips and small sips thin H2O for comfort. Will continue to follow. Thank you for this consult. Treatment Plan  Requires SLP Intervention: Yes     Recommended Diet and Intervention  Diet Solids Recommendation: NPO  Liquid Consistency Recommendation: NPO  Recommended Form of Meds: Essential meds crushed in puree as able  Therapeutic Interventions: Patient/Family education;Oral Care; Therapeutic PO trials with SLP    Treatment/Goals  Timeframe for Short-term Goals: 1-2x/day for 3 days   Goal 1: Patient NPO. Goal 2: Patient staff will follow swallow safety recommendations. Goal 3: Patient will receive daily oral care, via staff, to decrease bacteria from the oral cavity. Goal 4: Re-assessment of swallow function for potential PO intake. Goal 5: [id number] eval      General  Chart Reviewed: Yes  Behavior/Cognition: Lethargic  Communication Observation: (Patient exhibits decreased verbalizations. What patient verbalized, decreased volume of speech and decreased labial movements were noted.)  Dentition: Adequate  Patient Positioning: Upright in bed  Consistencies Administered: Ice chips;Dysphagia Pureed (Dysphagia I); Thin - cup      Assessed patient's swallowing function with the following observations noted:     Oral Phase  Mastication: Ice chips(Patient exhibited decreased vertical jaw movement at the front of the mouth during oral prep of ice chip trials presented by SLP.)  Oral Phase - Comment: Oral transit of ice chip trials primarily measured 1-2 seconds in length.  Oral transit of puree consistency trials, presented by SLP, primarily measured 1-2 seconds in length and no oral cavity residue was noted post swallows. Oral transit of thin H2O trials, presented via cup by SLP, primarily measured 1-2 seconds in length. Pharyngeal Phase  Laryngeal Elevation: (Patient exhibited sluggish, moderate-severely decreased laryngeal elevation for swallow airway protection.)  Pharyngeal Phase - Comment: No outward S/S penetration/aspiration was noted with any ice chip trial, puree consistency trial, or thin H2O trial presented during assessment this date. Did not observe swallow function with any additional consistency secondary to noted lethargy. At this time, would recommend NPO status. Essential meds crushed in pudding/applesauce. If patient receives mouth care prior to intake, okay for ice chips and small sips thin H2O for comfort. Will continue to follow.      Electronically signed by KEVIN Harrington on 4/19/2021 at 12:59 PM

## 2021-04-19 NOTE — ED NOTES
Paged Dr Elan Pandya with Neurosurgery @ [facility]  04/19/21 [id number]

## 2021-04-19 NOTE — CONSULTS
[facility]  Consult Note    CHIEF COMPLAINT:  Fall at assisted living facility, head truma    HISTORY OF PRESENT ILLNESS:      The patient is a 67 y.o. female who presents to the ED following a fall today at [facility] assisted living facility. She has a history of dementia is not the best historian. She is unable to recall the event or any of her medical history. She denies any HAs. She sustained posterior occipital head trauma which required staples in her scalp. CT head revealed a left frontal and right convexity traumatic subarachnoid hemorrhage for which I was asked to evaluate. The patient does not take anticoagulation medication.        Past Medical History:   Diagnosis Date    ADD (attention deficit disorder)     Carotid atherosclerosis     Degeneration of cervical intervertebral disc     Mixed hyperlipidemia     Nontoxic multinodular goiter     OA (osteoarthritis)     Osteopenia     Panic disorder without agoraphobia     Peripheral neuropathy     Pure hypercholesterolemia     Urinary incontinence     Vitamin D deficiency        Past Surgical History:   Procedure Laterality Date    COLONOSCOPY      HYSTERECTOMY, TOTAL ABDOMINAL      w/removal of both ovaries    JOINT REPLACEMENT      knee    TOTAL KNEE ARTHROPLASTY Right 2011    TOTAL KNEE ARTHROPLASTY Left 2012    VAGINA SURGERY      Abdomino-Vaginal Vesical Neck Suspension        Medications    Current Facility-Administered Medications:     atorvastatin (LIPITOR) tablet 40 mg, 40 mg, Oral, Nightly, MD Celi Jarvis ON 4/20/2021] cholestyramine light packet 4 g, 4 g, Oral, Daily, Maldonado Herndon MD    [Held by provider] dextromethorphan-quiNIDine (NUEDEXTA) 20-10 MG per capsule 1 capsule, 1 capsule, Oral, Daily, Maldonado Herndon MD    [Held by provider] meclizine (ANTIVERT) tablet 25 mg, 25 mg, Oral, TID PRN, Maldonado Herndon MD    [Held by provider] QUEtiapine (SEROQUEL) tablet 25 mg, 25 mg, Oral, Nightly, Chantell Chester MD    vitamin D (ERGOCALCIFEROL) capsule 50,000 Units, 50,000 Units, Oral, Weekly, Chantell Chester MD    levETIRAcetam (KEPPRA) 500 mg/100 mL IVPB, 500 mg, Intravenous, Q12H, Chantell Chester MD    LORazepam (ATIVAN) injection 2 mg, 2 mg, Intravenous, Q4H PRN, Chantell Chester MD    sodium chloride flush 0.9 % injection 5-40 mL, 5-40 mL, Intravenous, 2 times per day, Chantell Chester MD    sodium chloride flush 0.9 % injection 5-40 mL, 5-40 mL, Intravenous, PRN, Chantell Chester MD    0.9 % sodium chloride infusion, 25 mL, Intravenous, PRN, Chantell Chester MD    promethazine (PHENERGAN) tablet 12.5 mg, 12.5 mg, Oral, Q6H PRN **OR** ondansetron (ZOFRAN) injection 4 mg, 4 mg, Intravenous, Q6H PRN, Chantell Chester MD, 4 mg at 21 1120    acetaminophen (TYLENOL) tablet 650 mg, 650 mg, Oral, Q6H PRN **OR** acetaminophen (TYLENOL) suppository 650 mg, 650 mg, Rectal, Q6H PRN, Chantell Chester MD    0.9 % sodium chloride infusion, , Intravenous, Continuous, Chantell Chester MD    potassium chloride (KLOR-CON M) extended release tablet 40 mEq, 40 mEq, Oral, PRN **OR** potassium bicarb-citric acid (EFFER-K) effervescent tablet 40 mEq, 40 mEq, Oral, PRN **OR** potassium chloride 10 mEq/100 mL IVPB (Peripheral Line), 10 mEq, Intravenous, PRN, Chantell Chester MD    magnesium sulfate 2000 mg in 50 mL IVPB premix, 2,000 mg, Intravenous, PRN, Chantell Chester MD    ipratropium-albuterol (DUONEB) nebulizer solution 1 ampule, 1 ampule, Inhalation, Q4H PRN, Chantell Chester MD  Latex, Alcohol, Astelin [azelastine hcl], Ciprofloxacin hcl, Fluticasone propionate, Hydroquinone, Livalo [pitavastatin], Other, and Vitamin b complex [b-100]    Social History  Social History     Tobacco Use   Smoking Status Former Smoker    Packs/day: 2.50    Years: 5.00    Pack years: 12.50    Quit date: 200    Years since quittin.3   Smokeless Tobacco Never Used     Social History     Substance and Sexual Activity   Alcohol Use Never    Frequency: Never         Family History   Problem Relation Age of Onset    Cancer Mother         Hodgkins Lymphoma, AML    Breast Cancer Mother     Heart Disease Other          REVIEW OF SYSTEMS:  Constitutional: No fevers No chills  Neck:No stiffness  Respiratory: No shortness of breath  Cardiovascular: No chest pain No palpitations  Gastrointestinal: No abdominal pain    Genitourinary: No Dysuria  Neurological: No headache, no confusion    PHYSICAL EXAM:  Vitals:    04/19/21 0846   BP: (!) 144/82   Pulse: 86   Resp: 18   Temp: 98.2 °F (36.8 °C)   SpO2: 96%       Constitutional: The patient appears well-developed and well-nourished. Eyes  conjunctiva normal.  Conjugate Gaze  Ear, nose, throat - No scars, masses, or lesions over external nose or ears, no atrophy of tongue  Neck-symmetric, no masses noted, no jugular vein distension  Respiration- chest wall appears symmetric, good expansion, normal effort without use of accessory muscles  Musculoskeletal  no significant wasting of muscles noted, no bony deformities  Extremities-no clubbing, cyanosis or edema  Skin  warm, dry, and intact. No rash, erythema, or pallor. Neurologic Examination  Awake, Alert, unable to state year, location or president  Normal speech pattern, following commands  Motor 5/5 all extremities, no cleare drift  PERRL, EOMI  No deficits to light touch   n    DATA:  Nursing/pcp notes, imaging,labs and vitals reviewed.      PT,OT and/or speech notes reviewed    Lab Results   Component Value Date    WBC 7.8 04/19/2021    HGB 14.8 04/19/2021    HCT 45.4 04/19/2021    MCV 92.5 04/19/2021     04/19/2021     Lab Results   Component Value Date     04/19/2021    K 4.4 04/19/2021     04/19/2021    CO2 27 04/19/2021    BUN 10 04/19/2021    CREATININE 0.5 04/19/2021    GLUCOSE 153 (H) 04/19/2021    CALCIUM 9.4 04/19/2021    PROT 6.9 04/19/2021    LABALBU 4.4 04/19/2021    BILITOT 0.8 04/19/2021    ALKPHOS 109 (H) 04/19/2021    AST 32 04/19/2021    ALT 23 04/19/2021    LABGLOM >60 04/19/2021    GFRAA >59 04/19/2021     Lab Results   Component Value Date    INR 0.98 04/19/2021    PROTIME 12.9 04/19/2021     EXAMINATION: CT head without contrast 4/19/2021   HISTORY: Posterior head injury. Question seizure. FINDINGS: Multiple contiguous axial views are obtained from the skull   base to the vertex per protocol without intravenous contrast   enhancement with reformatted images obtained in the sagittal and   coronal projections from the original data set. There is atrophy of the brain with prominence of the subarachnoid   spaces and ventricular enlargement. Small vessel ischemic disease is   noted involving the periventricular and higher white matter tracts. On images through the right parietal vertex there is evidence of   subarachnoid hemorrhage which is demonstrated on images 24 through 26   of the axial sequence. There is also left frontal hemorrhage including   what I suspect to be predominantly subarachnoid hemorrhage overlying   the left frontal cortex as well as a small amount of blood within the   anterior interhemispheric fissure within the subdural space measuring   5 mm in thickness. There is no associated mass effect. There is no   associated calvarial injury. There is no mass effect or shift of the   midline. No evidence of acute infarct.       Impression   1.. Left frontal hemorrhage including a small amount of blood within   the anterior interhemispheric fissure within the subdural space as   well as subarachnoid hemorrhage overlying the left frontal cortex. Some contusional hemorrhage of the left frontal cortex is also   suspected. Also demonstrated is subarachnoid hemorrhage high within   the right parietal vertex. There is no associated mass effect or shift   of the midline. 2. Atrophy with small vessel ischemic disease.    3. I spoke with Dr. Zulma Ann in the ER at 9:30 AM concerning the   findings of hemorrhage within the right parietal vertex and left   frontal lobe.    Signed by Dr Juan Gardiner on 4/19/2021 9:34 AM             IMPRESSION  67year old female, hx of dementia, s/p fall at her assisted living facility with traumatic subarachnoid hemorrhage noted on CT    RECOMMENDATIONS:    No emergent neurosurgical intervention warranted  Frequent neuro checks  Maintain SBP   Will obtain repeat CT head in AM  Keppra 500 BID x 7 days      Nick Ovalles DO

## 2021-04-20 ENCOUNTER — APPOINTMENT (OUTPATIENT)
Dept: CT IMAGING | Age: 76
DRG: 083 | End: 2021-04-20
Payer: MEDICARE

## 2021-04-20 PROBLEM — Z51.5 PALLIATIVE CARE PATIENT: Status: ACTIVE | Noted: 2021-04-20

## 2021-04-20 LAB
ANION GAP SERPL CALCULATED.3IONS-SCNC: 15 MMOL/L (ref 7–19)
BASOPHILS ABSOLUTE: 0 K/UL (ref 0–0.2)
BASOPHILS RELATIVE PERCENT: 0.2 % (ref 0–1)
BUN BLDV-MCNC: 9 MG/DL (ref 8–23)
CALCIUM SERPL-MCNC: 9.1 MG/DL (ref 8.8–10.2)
CHLORIDE BLD-SCNC: 102 MMOL/L (ref 98–111)
CO2: 23 MMOL/L (ref 22–29)
CREAT SERPL-MCNC: 0.5 MG/DL (ref 0.5–0.9)
EKG P AXIS: 50 DEGREES
EKG P-R INTERVAL: 160 MS
EKG Q-T INTERVAL: 436 MS
EKG QRS DURATION: 86 MS
EKG QTC CALCULATION (BAZETT): 444 MS
EKG T AXIS: 49 DEGREES
EOSINOPHILS ABSOLUTE: 0 K/UL (ref 0–0.6)
EOSINOPHILS RELATIVE PERCENT: 0 % (ref 0–5)
GFR AFRICAN AMERICAN: >59
GFR NON-AFRICAN AMERICAN: >60
GLUCOSE BLD-MCNC: 133 MG/DL (ref 74–109)
HCT VFR BLD CALC: 38.9 % (ref 37–47)
HEMOGLOBIN: 12.7 G/DL (ref 12–16)
IMMATURE GRANULOCYTES #: 0 K/UL
LYMPHOCYTES ABSOLUTE: 1.2 K/UL (ref 1.1–4.5)
LYMPHOCYTES RELATIVE PERCENT: 9.7 % (ref 20–40)
MCH RBC QN AUTO: 30 PG (ref 27–31)
MCHC RBC AUTO-ENTMCNC: 32.6 G/DL (ref 33–37)
MCV RBC AUTO: 92 FL (ref 81–99)
MONOCYTES ABSOLUTE: 0.7 K/UL (ref 0–0.9)
MONOCYTES RELATIVE PERCENT: 5.3 % (ref 0–10)
NEUTROPHILS ABSOLUTE: 10.8 K/UL (ref 1.5–7.5)
NEUTROPHILS RELATIVE PERCENT: 84.5 % (ref 50–65)
PDW BLD-RTO: 13.4 % (ref 11.5–14.5)
PLATELET # BLD: 175 K/UL (ref 130–400)
PMV BLD AUTO: 11.8 FL (ref 9.4–12.3)
POTASSIUM REFLEX MAGNESIUM: 4.3 MMOL/L (ref 3.5–5)
RBC # BLD: 4.23 M/UL (ref 4.2–5.4)
SODIUM BLD-SCNC: 140 MMOL/L (ref 136–145)
WBC # BLD: 12.8 K/UL (ref 4.8–10.8)

## 2021-04-20 PROCEDURE — 6360000002 HC RX W HCPCS: Performed by: STUDENT IN AN ORGANIZED HEALTH CARE EDUCATION/TRAINING PROGRAM

## 2021-04-20 PROCEDURE — 6370000000 HC RX 637 (ALT 250 FOR IP): Performed by: STUDENT IN AN ORGANIZED HEALTH CARE EDUCATION/TRAINING PROGRAM

## 2021-04-20 PROCEDURE — 85025 COMPLETE CBC W/AUTO DIFF WBC: CPT

## 2021-04-20 PROCEDURE — 99231 SBSQ HOSP IP/OBS SF/LOW 25: CPT | Performed by: NEUROLOGICAL SURGERY

## 2021-04-20 PROCEDURE — 97161 PT EVAL LOW COMPLEX 20 MIN: CPT

## 2021-04-20 PROCEDURE — 97116 GAIT TRAINING THERAPY: CPT

## 2021-04-20 PROCEDURE — 51798 US URINE CAPACITY MEASURE: CPT

## 2021-04-20 PROCEDURE — 51702 INSERT TEMP BLADDER CATH: CPT

## 2021-04-20 PROCEDURE — 2580000003 HC RX 258: Performed by: STUDENT IN AN ORGANIZED HEALTH CARE EDUCATION/TRAINING PROGRAM

## 2021-04-20 PROCEDURE — 70450 CT HEAD/BRAIN W/O DYE: CPT

## 2021-04-20 PROCEDURE — 97530 THERAPEUTIC ACTIVITIES: CPT

## 2021-04-20 PROCEDURE — 92526 ORAL FUNCTION THERAPY: CPT

## 2021-04-20 PROCEDURE — 36415 COLL VENOUS BLD VENIPUNCTURE: CPT

## 2021-04-20 PROCEDURE — 95816 EEG AWAKE AND DROWSY: CPT | Performed by: PSYCHIATRY & NEUROLOGY

## 2021-04-20 PROCEDURE — 80048 BASIC METABOLIC PNL TOTAL CA: CPT

## 2021-04-20 PROCEDURE — 92523 SPEECH SOUND LANG COMPREHEN: CPT

## 2021-04-20 PROCEDURE — 99233 SBSQ HOSP IP/OBS HIGH 50: CPT | Performed by: PSYCHIATRY & NEUROLOGY

## 2021-04-20 PROCEDURE — 1210000000 HC MED SURG R&B

## 2021-04-20 PROCEDURE — 93010 ELECTROCARDIOGRAM REPORT: CPT | Performed by: INTERNAL MEDICINE

## 2021-04-20 RX ORDER — ENALAPRILAT 2.5 MG/2ML
1.25 INJECTION INTRAVENOUS EVERY 6 HOURS PRN
Status: DISCONTINUED | OUTPATIENT
Start: 2021-04-20 | End: 2021-04-23 | Stop reason: HOSPADM

## 2021-04-20 RX ADMIN — LEVETIRACETAM 500 MG: 5 INJECTION INTRAVENOUS at 20:59

## 2021-04-20 RX ADMIN — LEVETIRACETAM 500 MG: 5 INJECTION INTRAVENOUS at 08:38

## 2021-04-20 RX ADMIN — QUETIAPINE FUMARATE 25 MG: 25 TABLET ORAL at 21:02

## 2021-04-20 RX ADMIN — SODIUM CHLORIDE, PRESERVATIVE FREE 10 ML: 5 INJECTION INTRAVENOUS at 08:40

## 2021-04-20 RX ADMIN — SODIUM CHLORIDE: 9 INJECTION, SOLUTION INTRAVENOUS at 02:00

## 2021-04-20 ASSESSMENT — PAIN SCALES - GENERAL
PAINLEVEL_OUTOF10: 0

## 2021-04-20 NOTE — CONSULTS
**Physician Signature**  This document was electronically signed by: Liang Ramires MD  04/20/2021   09:34 AM    **Consult Information**  Member Facility: [address] [place] [address] Drive MRN: [id number]  Visit/Encounter Number: [id number]  Consult ID: [id number]  Facility Time Zone: CT  Date and Time of Request: 04/20/2021 06:38 AM  Requesting Clinician: DR. Zeke Sotomayor  Patient Name: Selin Dailey  YOB: 1945  Gender: Female    **Admission**  Admission Date: 04-  Chief reason for ICU admission: intracranial bleed    **Core Metrics**  General orienting sentence for patient: 4/19 PM:  76F with carotid stenosis   who had a syncopal episode, and had a large laceration after falling.  has a   LEFT frontal subarachnoid bleed. Neurologically intact. Alzheimers- but more   awake. CT w no change in bleed.   Chief physiologic deterioration: None - Stable patient  Is the patient on DVT prophylaxis?: Yes  Prophylaxis type: Mechanical  Is the patient on GI prophylaxis?: Not Indicated  Has this patient reached their nutritional goal?: No and issues are being   addressed  Nutritional Goals Details: needs a swallow eval.  Are there current issues with pain management in this patient?:   No  Are there issues with skin integrity?: No  Are there issues with delirium?: Yes and issues are being addressed  Delirium Comments: has dementia at baseline and is restless  Has the patient been mobilized?: No  Is this patient currently intubated?: No  Are there ethical or care philosophy or family issues?: No  Do you recommend an in depth evaluation?: No  Do you recommend the patient should: : Be downgraded/transitioned out of   ICU

## 2021-04-20 NOTE — PROGRESS NOTES
Patient:   Valentin Crain  MR#:    [id number]   Room:    6523/643-15   YOB: 1945  Date of Progress Note: 4/20/2021  Time of Note                           7:54 AM  Consulting Physician:   Mortimer Bidding, M.D. Attending Physician:  Nadeen Martinez MD     Chief complaint AMS possible seizure like activity post fall     S:This 67 y.o. female  with a past medical history significant for dementia is seen post fall with seizure like activity. The history is obtained from the chart and nurse. The staff at assisted living facility where they noted her to lost consciousness while walking and fell backwards and hit the back of her head. There was a report of seizure like activity. Following she was combative with a non focal exam except for asymmetric pupils. The ER reported she was at her baseline based upon previous records. Her CT of the brain in the ER had evidence of a left frontal hemorrhage with some subarachnoid blood left frontal region and high right parietal cortex with no mass effect of shift. Bradycardia overnight In restraints.      REVIEW OF SYSTEMS:  Unable to obtain due to mental status    Past Medical History:      Diagnosis Date    ADD (attention deficit disorder)     Carotid atherosclerosis     Degeneration of cervical intervertebral disc     Mixed hyperlipidemia     Nontoxic multinodular goiter     OA (osteoarthritis)     Osteopenia     Panic disorder without agoraphobia     Peripheral neuropathy     Pure hypercholesterolemia     Urinary incontinence     Vitamin D deficiency        Past Surgical History:      Procedure Laterality Date    COLONOSCOPY      HYSTERECTOMY, TOTAL ABDOMINAL      w/removal of both ovaries    JOINT REPLACEMENT      knee    TOTAL KNEE ARTHROPLASTY Right 2011    TOTAL KNEE ARTHROPLASTY Left 2012    VAGINA SURGERY      Abdomino-Vaginal Vesical Neck Suspension       Medications in Hospital:      Current Facility-Administered Medications:     atorvastatin Intravenous, PRN, Zehra Baldwin MD    ipratropium-albuterol (DUONEB) nebulizer solution 1 ampule, 1 ampule, Inhalation, Q4H PRN, Zehra Baldwin MD    Allergies:  Latex, Alcohol, Astelin [azelastine hcl], Ciprofloxacin hcl, Fluticasone propionate, Hydroquinone, Livalo [pitavastatin], Other, and Vitamin b complex [b-100]    Social History:   TOBACCO:   reports that she quit smoking about 31 years ago. She has a 12.50 pack-year smoking history. She has never used smokeless tobacco.  ETOH:   reports no history of alcohol use. Family History:       Problem Relation Age of Onset   [facility] Cancer Mother         Hodgkins Lymphoma, AML    Breast Cancer Mother     Heart Disease Other          PHYSICAL EXAM:  BP (!) 114/40   Pulse 67   Temp 98.2 °F (36.8 °C) (Temporal)   Resp 21   Ht 5' 5\" (1.651 m)   Wt 121 lb (54.9 kg)   SpO2 95%   BMI 20.14 kg/m²     Constitutional  well developed, well nourished. Eyes  conjunctiva normal.   Ear, nose, throat - No scars, masses, or lesions over external nose or ears, no atrophy of tongue  Neck-symmetric, no masses noted, no jugular vein distension  Respiration- chest wall appears symmetric, good expansion,   normal effort without use of accessory muscles  Musculoskeletal  no significant wasting of muscles noted, no bony deformities  Extremities-no clubbing, cyanosis or edema  Skin  warm, dry, and intact. No rash, erythema, or pallor.   Psychiatric  mood, affect, and behavior slow     Neurological exam  Awake, more alert, bradycardic fluent minimally verbal oriented self and [place]  Not following commands  Attention and concentration appear impaired  Recent and remote memory appears impaired  Speech normal without dysarthria       Cranial Nerve Exam     CN III, IV,VI-EOMI, No nystagmus, conjugate eye movements, no ptosis    CN VII-no facial assymetry       Motor Exam  Arms in restraints       Tremors- no tremors in hands or head noted     Gait  Not tested      Nursing/pcp subdural   hemorrhage along the intercerebral falx measuring 3 mm in greatest   width. No midline shifting. Questionable subcentimeter left frontal   contusion. Signed by Dr Rio Zavaleta on 4/20/2021 7:42 AM   EEG [[id number]]    Resulted: 04/20/21 0731    Updated: 04/20/21 0733    Narrative:     Greyson Martini MD     4/20/2021  7:33 AM     Patient:   Parmjit Cook   MR#:    [id number]   Room:    [address] [place] [address]   Date of Birth:   1945   Date of Progress Note: 4/20/2021   Time of Note                           7:31 AM   Consulting Physician:   Greyson Martini M.D. Attending Physician: Twan Mcdaniel MD         This is a multichannel digital EEG recording using the   international 10-20 placement system. Technical Summary:     Background EEG activity: The occipital dominant rhythm is 6-7 Hz. There is much low voltage 3-5 Hz activity seen in a generalized   distribution intermixed with low voltage 18-22 Hz activity. There   is moderate to high voltage moderate voltage 1.5-2 Hz activity   seen primarily in anterior regions. Photic Stimulation: No abnormal waveforms are noted with various   frequencies of flickering light. IMPRESSION:   This is an abnormal EEG due to the diffuse slowing of the   background. This finding is consistent with a diffuse disturbance   in cerebral function. No clear epileptiform activity was noted   during the recording period.        Dr Rusty Campbell Certified in Neurology   Board Certified in [facility] 61 Neurophysiology   Fellowship trained in [facility] 92 Time 25 minutes           RECORD REVIEW: Previous medical records, medications were reviewed at today's visit    IMPRESSION:   Intracerebral/subarahnoid hemorrhage post fall with possible seizure-no seizures noted in hospital   Patient with advance dementia-unclear baseline     Currently more awake  [place] to self and [place] but not following commands        Non surgical hemorrhage  ICU monitoring with neuro checks  Repeat CT relatively stable  EEG slow no seizures noted  On Keppra 500 mg IV Q 12 for seizure prophylaxis   BP ok  DVT prophylaxis-SCDs  On NS IVF-speech to assess swallowing   PT/OT     Supportive care          CALL WITH ANY QUESTIONS  [phone] CELL  Dr Damon Tabor

## 2021-04-20 NOTE — PROGRESS NOTES
[facility]  Progress Note    LAST 24 HOURS: Patient seen and examined. Mrs. Connie Pedroza is resting in bed this morning confused, can only state her name. The nurse reports that she tell her she is in [place]. She denies any headaches. Repeat CT head this AM is very slightly increase from yesterday, still non surgical.      CHIEF COMPLAINT:  Fall at assisted living facility, head truma    HISTORY OF PRESENT ILLNESS:      The patient is a 67 y.o. female who presents to the ED following a fall today at [facility] assisted living facility. She has a history of dementia is not the best historian. She is unable to recall the event or any of her medical history. She denies any HAs. She sustained posterior occipital head trauma which required staples in her scalp. CT head revealed a left frontal and right convexity traumatic subarachnoid hemorrhage for which I was asked to evaluate. The patient does not take anticoagulation medication.        Past Medical History:   Diagnosis Date    ADD (attention deficit disorder)     Carotid atherosclerosis     Degeneration of cervical intervertebral disc     Mixed hyperlipidemia     Nontoxic multinodular goiter     OA (osteoarthritis)     Osteopenia     Palliative care patient 04/20/2021    Panic disorder without agoraphobia     Peripheral neuropathy     Pure hypercholesterolemia     Urinary incontinence     Vitamin D deficiency        Past Surgical History:   Procedure Laterality Date    COLONOSCOPY      HYSTERECTOMY, TOTAL ABDOMINAL      w/removal of both ovaries    JOINT REPLACEMENT      knee    TOTAL KNEE ARTHROPLASTY Right 2011    TOTAL KNEE ARTHROPLASTY Left 2012    VAGINA SURGERY      Abdomino-Vaginal Vesical Neck Suspension        Medications    Current Facility-Administered Medications:     atorvastatin (LIPITOR) tablet 40 mg, 40 mg, Oral, Nightly, Alfonzo Davies MD    cholestyramine light packet 4 g, 4 g, Oral, Daily, Gloria Perry Hector Tyson MD    [Held by provider] dextromethorphan-quiNIDine (NUEDEXTA) 20-10 MG per capsule 1 capsule, 1 capsule, Oral, Daily, Debbie Dalton MD    [Held by provider] meclizine (ANTIVERT) tablet 25 mg, 25 mg, Oral, TID PRN, Debbie Dalton MD  Sindhu Loki  [Held by provider] QUEtiapine (SEROQUEL) tablet 25 mg, 25 mg, Oral, Nightly, Debbie Dalton MD    vitamin D (ERGOCALCIFEROL) capsule 50,000 Units, 50,000 Units, Oral, Weekly, Debbie Dalton MD    levETIRAcetam (KEPPRA) 500 mg/100 mL IVPB, 500 mg, Intravenous, Q12H, Debbie Dalton MD, Stopped at 04/19/21 2200    LORazepam (ATIVAN) injection 2 mg, 2 mg, Intravenous, Q4H PRN, Debbie Dalton MD    sodium chloride flush 0.9 % injection 5-40 mL, 5-40 mL, Intravenous, 2 times per day, Debbie Dalton MD, 10 mL at 04/19/21 2145    sodium chloride flush 0.9 % injection 5-40 mL, 5-40 mL, Intravenous, PRN, Debbie Dalton MD    0.9 % sodium chloride infusion, 25 mL, Intravenous, PRN, Debbie Dalton MD    promethazine (PHENERGAN) tablet 12.5 mg, 12.5 mg, Oral, Q6H PRN **OR** ondansetron (ZOFRAN) injection 4 mg, 4 mg, Intravenous, Q6H PRN, Debbie Dalton MD, 4 mg at 04/19/21 1120    acetaminophen (TYLENOL) tablet 650 mg, 650 mg, Oral, Q6H PRN **OR** acetaminophen (TYLENOL) suppository 650 mg, 650 mg, Rectal, Q6H PRN, Debbie Dalton MD    0.9 % sodium chloride infusion, , Intravenous, Continuous, Debbie Dalton MD, Last Rate: 75 mL/hr at 04/20/21 0600, Rate Verify at 04/20/21 0600    potassium chloride (KLOR-CON M) extended release tablet 40 mEq, 40 mEq, Oral, PRN **OR** potassium bicarb-citric acid (EFFER-K) effervescent tablet 40 mEq, 40 mEq, Oral, PRN **OR** potassium chloride 10 mEq/100 mL IVPB (Peripheral Line), 10 mEq, Intravenous, PRN, Debbie Dalton MD    magnesium sulfate 2000 mg in 50 mL IVPB premix, 2,000 mg, Intravenous, PRN, Debbie Dalton MD    ipratropium-albuterol (DUONEB) nebulizer solution 1 ampule, 1 ampule, Inhalation, Q4H Component Value Date    WBC 12.8 (H) 04/20/2021    HGB 12.7 04/20/2021    HCT 38.9 04/20/2021    MCV 92.0 04/20/2021     04/20/2021     Lab Results   Component Value Date     04/20/2021    K 4.3 04/20/2021     04/20/2021    CO2 23 04/20/2021    BUN 9 04/20/2021    CREATININE 0.5 04/20/2021    GLUCOSE 133 (H) 04/20/2021    CALCIUM 9.1 04/20/2021    PROT 6.9 04/19/2021    LABALBU 4.4 04/19/2021    BILITOT 0.8 04/19/2021    ALKPHOS 109 (H) 04/19/2021    AST 32 04/19/2021    ALT 23 04/19/2021    LABGLOM >60 04/20/2021    GFRAA >59 04/20/2021     Lab Results   Component Value Date    INR 0.98 04/19/2021    PROTIME 12.9 04/19/2021     EXAMINATION: CT head without contrast 4/19/2021   HISTORY: Posterior head injury. Question seizure. FINDINGS: Multiple contiguous axial views are obtained from the skull   base to the vertex per protocol without intravenous contrast   enhancement with reformatted images obtained in the sagittal and   coronal projections from the original data set. There is atrophy of the brain with prominence of the subarachnoid   spaces and ventricular enlargement. Small vessel ischemic disease is   noted involving the periventricular and higher white matter tracts. On images through the right parietal vertex there is evidence of   subarachnoid hemorrhage which is demonstrated on images 24 through 26   of the axial sequence. There is also left frontal hemorrhage including   what I suspect to be predominantly subarachnoid hemorrhage overlying   the left frontal cortex as well as a small amount of blood within the   anterior interhemispheric fissure within the subdural space measuring   5 mm in thickness. There is no associated mass effect. There is no   associated calvarial injury. There is no mass effect or shift of the   midline.  No evidence of acute infarct.       Impression   1.. Left frontal hemorrhage including a small amount of blood within   the anterior interhemispheric fissure within the subdural space as   well as subarachnoid hemorrhage overlying the left frontal cortex. Some contusional hemorrhage of the left frontal cortex is also   suspected. Also demonstrated is subarachnoid hemorrhage high within   the right parietal vertex. There is no associated mass effect or shift   of the midline. 2. Atrophy with small vessel ischemic disease. 3. I spoke with Dr. Unruly Michaels in the ER at 9:30 AM concerning the   findings of hemorrhage within the right parietal vertex and left   frontal lobe. Signed by Dr Angelique Mandel on 4/19/2021 9:34 AM           Narrative   CT head 4/20/2021 4:02 AM   HISTORY: Follow-up subarachnoid hemorrhage   TECHNIQUE: Axial images of the head were obtained without IV contrast.   Coronal and sagittal reformatted images are reconstructed and   reviewed. COMPARISON: 4/19/2021. DLP: 833 mGy cm Automated exposure control was utilized to minimize   patient radiation dose. FINDINGS:    There is similar to slightly increasing subarachnoid hemorrhage along   the convexities of the left greater than right cerebral hemisphere. A   small subdural hemorrhage along the intercerebral falx (measured on   image 25) measures 3 mm. There is a similar small left frontal   contusion considered. . Chronic small vessel ischemic changes are   noted. An 8 mm calcification in the extra-axial space adjacent the   anterior left frontal lobe may be subarachnoid granulation   calcification, incidental calcified meningioma considered. Hyperostosis frontalis internus. No skull fracture.       Impression   1. Similar to slightly increased subarachnoid hemorrhage along the   convexities of the left greater than right sulci with small subdural   hemorrhage along the intercerebral falx measuring 3 mm in greatest   width. No midline shifting. Questionable subcentimeter left frontal   contusion.    Signed by Dr Keyur Ponce on 4/20/2021 7:42 AM           IMPRESSION  67year old female, hx of dementia, s/p fall at her assisted living facility with traumatic subarachnoid hemorrhage noted on CT    RECOMMENDATIONS:    Repeat CT head this AM shows very slight increase in left frontal and right convexity SAH, new small falcine SDH, however, remains non-surgical at this time. Maintain SBP   Keppra 500 BID x 7 days (started on 4/19/2021)  Okay to transfer to floor from neurosurgical standpoint       RONALD Farah     Neurosurgery Attending  I have reviewed this case thoroughly with the provider above. I have seen and evaluated this patient myself. I have reviewed all the imaging studies, labs, notes etc. within the chart. I agree. Slight increase in size of hemorrhage, remains non-surgical.  Cleared for transfer to floor from our standpoint. Appreciate neurology input. EEG with slowing, remains on Keppra. Dicussed status with son today, concerned about placement.       Elan Pandya, DO

## 2021-04-20 NOTE — CONSULTS
**Physician Signature**  This document was electronically signed by: Jayson Butler MD  04/19/2021   10:21 PM    **Consult Information**  Member Facility: [address] [place] [address] Drive MRN: [id number]  Visit/Encounter Number: [id number]  Consult ID: [id number]  Facility Time Zone: CT  Date and Time of Request: 04/19/2021 09:24 PM  Requesting Clinician: Cassie Engel MD  Patient Name: Hanh Osman  Date of Birth: [birth year]  Gender: Female    **Admission**  Admission Date: 04-  Chief reason for ICU admission: intracranial bleed    **Core Metrics**  General orienting sentence for patient: 4/19 PM:  76F with carotid stenosis   who had a syncopal episode, and had a large laceration after falling.  has a   LEFT frontal subarachnoid bleed. Neurologically intact.   Chief physiologic deterioration: None - Stable patient  Is the patient on DVT prophylaxis?: Yes  Prophylaxis type: Mechanical  Is the patient on GI prophylaxis?: Not Indicated  Has this patient reached their nutritional goal?: No and issues are being   addressed  Nutritional Goals Details: needs a swallow eval.  Are there current issues with pain management in this patient?:   No  Are there issues with skin integrity?: No  Are there issues with delirium?: Yes and issues are being addressed  Delirium Comments: has dementia at baseline and is restless  Has the patient been mobilized?: No  Is this patient currently intubated?: No  Are there ethical or care philosophy or family issues?: No  Do you recommend an in depth evaluation?: No  Do you recommend the patient should: : Continue ICU level of care

## 2021-04-20 NOTE — PROGRESS NOTES
Patient transported to room 537 with transport in a bed. Tele on patient. Verified with tele room that patient is connected, patient is running sinus cristopher at 46, box number [phone] [id number]. Patient is in restraints. Report given to Magali Macedo RN.

## 2021-04-20 NOTE — PROGRESS NOTES
Speech Language Pathology  Facility/Department: [facility] ICU  SWALLOW THERAPY  SPEECH LANGUAGE EVALUATION     NAME: Parmjit Cook  : 1945  MRN: [id number]    ADMISSION DATE: 2021  ADMITTING DIAGNOSIS: has Pure hypercholesterolemia; Generalized anxiety disorder; Vitamin D deficiency; Persistent insomnia; Impaired fasting glucose; Carotid artery stenosis; Osteopenia of both lower legs; Fatigue; Chronic vertigo; Nontoxic multinodular goiter; OA (osteoarthritis) of knee; Allergic rhinitis; Attention deficit disorder (ADD) without hyperactivity; Auditory hallucinations; Carotid atherosclerosis; Cervicalgia; Degeneration of cervical intervertebral disc; Disc degeneration, lumbosacral; Dizziness; Dysuria; Hypercalcemia; Lactose intolerance; Menopausal symptoms; Microhematuria; Palpitations; Panic disorder without agoraphobia; Paroxysmal hypertension; Peripheral neuropathy; Urine incontinence; Benign paroxysmal positional vertigo due to bilateral vestibular disorder; Unspecified hearing loss, bilateral; Tinnitus aurium, bilateral; Vertigo, benign paroxysmal, right; Late onset Alzheimer's disease without behavioral disturbance ([address] Nail fungal infection; AMS (altered mental status); LOC (loss of consciousness) ([address] Witnessed seizure-like activity ([address] Traumatic subarachnoid hemorrhage ([address] Head injury; Palliative care patient; and Punctate hemorrhage of left frontal lobe ([address] on their problem list.    Date of Treat: 2021  Evaluating Therapist: Mirella Singh    Current Diet level:  NPO    Reason for Referral  Rolf Spear was referred for a bedside swallow evaluation to assess the efficiency of her swallow function, identify signs and symptoms of aspiration and make recommendations regarding safe dietary consistencies, effective compensatory strategies, and safe eating environment. Impression  Re-assessed patient's swallowing function.  Patient exhibits decreased oral prep of more solid cues/prompts. Patient was 0% following simple 1 step auditory directions, without tactile cues, at independent level and with provision of models. Patient was 0% answering simple yes/no questions regarding immediate environment and current state of being independently and with provision of mod cues/prompts.)  Dentition: Adequate  Patient Positioning: Upright in bed  Consistencies Administered: Ice chips;Dysphagia Pureed (Dysphagia I); Thin - cup      Re-assessed patient's swallowing function with the following observations noted:     Oral Phase  Mastication: Ice chips(Patient exhibited decreased vertical jaw movement at the front of the mouth during oral prep of ice chip trials presented by SLP.)  Suspected Premature Bolus Loss: Puree (Oral transit of puree consistency trials, presented by SLP, varied from 1-3 seconds in length.)  Oral Phase - Comment: Oral transit of ice chip trials primarily measured 1-2 seconds in length. Min puree consistency residue was noted post swallows; residue cleared from the mouth with thin H2O washes. Oral transit of thin H2O trials, presented via cup by SLP, primarily measured 1-2 seconds in length.      Pharyngeal Phase  Suspected Swallow Delay: Puree (Suspect secondary to oral transit times.)  Laryngeal Elevation: (Patient exhibited sluggish, moderate-severely decreased laryngeal elevation for swallow airway protection.)  Pharyngeal Phase - Comment: No outward S/S penetration/aspiration was noted with any ice chip trial, puree consistency trial, or thin H2O trial presented during treatment session this date.     At this time, would trial puree consistency and thin liquids. TOTAL FEED ALTERNATING EVERY BITE OF FOOD WITH A DRINK. Recommend meds crushed in pudding/applesauce. Will continue to follow.     Electronically signed by KEVIN Santos on 4/20/2021 at 12:07 PM

## 2021-04-20 NOTE — PROGRESS NOTES
Mercedes Guzman arrived to room # [id number]. Presented with: SDH  Mental Status: Patient is disoriented. Vitals:    04/20/21 1235   BP: (!) 108/51   Pulse: 56   Resp: 20   Temp: 96.5 °F (35.8 °C)   SpO2: 94%     Patient safety contract and falls prevention contract reviewed with patient Yes. Oriented Patient to room. Call light within reach. Yes.   Needs, issues or concerns expressed at this time: no.      Electronically signed by Ritesh Silveira RN on 4/20/2021 at 12:44 PM

## 2021-04-20 NOTE — CONSULTS
caring for his mother would be available to assist with her needs. Palliative following for support and goals of care.       Electronically signed by Sarath Alejandro RN on 4/20/2021 at 11:19 AM

## 2021-04-20 NOTE — PROCEDURES
Patient:   Stevan Dillard  MR#:    [id number]   Room:    9407/009-10   YOB: 1945  Date of Progress Note: 4/20/2021  Time of Note                           7:31 AM  Consulting Physician:   Mercedes Meng M.D. Attending Physician:  Joan Olivia MD       This is a multichannel digital EEG recording using the international 10-20 placement system. Technical Summary:     Background EEG activity: The occipital dominant rhythm is 6-7 Hz. There is much low voltage 3-5 Hz activity seen in a generalized distribution intermixed with low voltage 18-22 Hz activity. There is moderate to high voltage moderate voltage 1.5-2 Hz activity seen primarily in anterior regions. Photic Stimulation: No abnormal waveforms are noted with various frequencies of flickering light. IMPRESSION:   This is an abnormal EEG due to the diffuse slowing of the background. This finding is consistent with a diffuse disturbance in cerebral function. No clear epileptiform activity was noted during the recording period.        Dr Beth Samaniego Certified in Neurology  Board Certified in Clinical Neurophysiology  Fellowship trained in [address] [place] [address] Time 25 minutes

## 2021-04-20 NOTE — PROGRESS NOTES
Physical Therapy    Facility/Department: [facility] SURG SERVICES  Initial Assessment    NAME: Terrence Fairchild  : 1945  MRN: [id number]    Date of Service: 2021    Discharge Recommendations:  Continue to assess pending progress, Patient would benefit from continued therapy after discharge(Anticipate pt requiring 24/7 assist for mobility initially when returning to facility to prevent falls.)        Assessment   Body structures, Functions, Activity limitations: Decreased functional mobility ; Decreased safe awareness;Decreased balance;Decreased strength;Decreased cognition;Decreased ROM  Assessment: Pt will benefit from skilled therapy to address mobility deficits. Left RW in room for next tx session. Will progress as tolerated. Prognosis: Fair  Decision Making: Low Complexity  PT Education: PT Role;Plan of Care;General Safety  REQUIRES PT FOLLOW UP: Yes  Activity Tolerance  Activity Tolerance: Patient Tolerated treatment well;Patient limited by cognitive status  Activity Tolerance: Pt intermittently anxious and requiring reassurance to continue w therapy. Patient Diagnosis(es): The primary encounter diagnosis was Fall from standing, initial encounter. Diagnoses of Loss of consciousness ([facility] Closed head injury, initial encounter, Traumatic subdural hemorrhage with loss of consciousness of 30 minutes or less, initial encounter [facility]), Traumatic subarachnoid hemorrhage with loss of consciousness of 30 minutes or less, initial encounter ([facility] Dementia with behavioral disturbance, unspecified dementia type ([facility] and Laceration of scalp, initial encounter were also pertinent to this visit.      has a past medical history of ADD (attention deficit disorder), Carotid atherosclerosis, Degeneration of cervical intervertebral disc, Mixed hyperlipidemia, Nontoxic multinodular goiter, OA (osteoarthritis), Osteopenia, Palliative care patient, Panic disorder without agoraphobia, Peripheral neuropathy, Pure hypercholesterolemia, Urinary incontinence, and Vitamin D deficiency. has a past surgical history that includes Vagina surgery; Total knee arthroplasty (Right, 2011); Total knee arthroplasty (Left, 2012); Hysterectomy, total abdominal; Colonoscopy; and joint replacement. Restrictions  Restrictions/Precautions  Restrictions/Precautions: Fall Risk  Vision/Hearing        Subjective  General  Patient assessed for rehabilitation services?: Yes  Additional Pertinent Hx: Hx of Alzheimer's per chart  Diagnosis: Traumatic subarachnoid hemorrhage  General Comment  Comments: RN agreed to removal and reapplication of restraints for pt to receive therapy. Subjective  Subjective: Pt agreeable to therapy eval this afternoon. Pain Screening  Patient Currently in Pain: No  Vital Signs  Patient Currently in Pain: No       Orientation  Orientation  Overall Orientation Status: Impaired  Orientation Level: Oriented to person;Disoriented to time;Disoriented to situation(knew she was in [facility] but unable to state place)  Social/Functional History  Social/Functional History  Type of Home: Assisted living  Home Equipment: Rolling walker  Additional Comments: Pt is from [facility] in memory unit; pt states she ambulated w RW but unsure of complete PLOF due to confusion  Cognition   Cognition  Overall Cognitive Status: Exceptions  Following Commands:  Follows one step commands with repetition  Attention Span: Difficulty dividing attention  Safety Judgement: Decreased awareness of need for safety  Problem Solving: Decreased awareness of errors;Assistance required to generate solutions;Assistance required to implement solutions  Insights: Decreased awareness of deficits  Initiation: Requires cues for all  Sequencing: Requires cues for some    Objective          AROM RLE (degrees)  RLE AROM: WFL  RLE General AROM: Noted to hold B LE's in position of ADD, IR at hip and inversion of ankles  AROM LLE (degrees)  LLE AROM : [facility] RLE  Strength RLE: WFL  Comment: Grossly antigravity  Strength LLE  Strength LLE: WFL  Comment: Grossly antigravity        Bed mobility  Supine to Sit: Minimal assistance  Sit to Supine: Minimal assistance  Transfers  Sit to Stand: Minimal Assistance  Stand to sit: Minimal Assistance  Ambulation  Ambulation?: Yes  WB Status: WBAT  Ambulation 1  Surface: level tile  Device: Hand-Held Assist  Assistance: Minimal assistance; Moderate assistance  Quality of Gait: Narrow PARISA, Bilateral hip IR, almost scissoring at times  Gait Deviations: Slow Drea;Decreased step height;Decreased step length  Distance: 2x15  Comments: Bed <> BR     Balance  Posture: Fair  Comments: Generally unsteady in standing and w ambulation, requiring min/modA        Plan   Plan  Times per week: 5-7x  Times per day: Daily  Plan weeks: 2 weeks  Current Treatment Recommendations: Endurance Training, Strengthening, Balance Training, Functional Mobility Training, Gait Training, Safety Education & Training, ROM, Transfer Training, Cognitive Reorientation  Safety Devices  Type of devices: Left in bed, Bed alarm in place, Call light within reach, Nurse notified, Gait belt  Restraints  Initially in place: Yes    G-Code       OutComes Score                                                  AM-PAC Score             Goals  Short term goals  Time Frame for Short term goals: 2 weeks  Short term goal 1: Supine<>sit, sup  Short term goal 2: STS, sup  Short term goal 3: Amb 150ft w RW, CGA       Therapy Time   Individual Concurrent Group Co-treatment   Time In           Time Out           Minutes                   Lieutenant Herring       Electronically signed by Lieutenant Herring on 4/20/2021 at 4:30 PM

## 2021-04-20 NOTE — PLAN OF CARE
Problem: Non-Violent Restraints  Goal: Removal from restraints as soon as assessed to be safe  Outcome: Ongoing  Goal: No harm/injury to patient while restraints in use  Outcome: Ongoing  Goal: Patient's dignity will be maintained  Outcome: Ongoing     Problem: Falls - Risk of:  Goal: Will remain free from falls  Description: Will remain free from falls  Outcome: Ongoing  Goal: Absence of physical injury  Description: Absence of physical injury  Outcome: Ongoing     Problem: Skin Integrity:  Goal: Will show no infection signs and symptoms  Description: Will show no infection signs and symptoms  Outcome: Ongoing  Goal: Absence of new skin breakdown  Description: Absence of new skin breakdown  Outcome: Ongoing

## 2021-04-20 NOTE — PROGRESS NOTES
Daily Progress Note    Date:2021  Patient: Jose Brizuela  : 1945  NMI:[id number]  CODE:Full Code No additional code details  Jeffrey Foss MD    Admit Date: 2021  8:45 AM   LOS: 1 day       Subjective:   50-year-old female with advanced dementia presented to the ED  following a fall at [facility] with reported witnessed seizure-like activity. Staff at [facility] noted patient briefly lost consciousness while ambulating and fell backwards and hit the back of her head. Admitted with traumatic subarachnoid hemorrhage after CT head showed left frontal hemorrhage, but no mass-effect or shift. Patient initially monitored in ICU and did require soft restraints for safety. Developed sinus bradycardia overnight however heart rate fluctuating 40s60s. Repeat CT head a.m.  shows similar to slightly increased subarachnoid hemorrhage, and new small subdural hemorrhage, but no midline shift. Hemorrhage nonsurgical.  Neurosurgery and neurology following. Patient remained stable and transferred to the floor. Patient remains pleasantly confused. Disoriented to time at baseline. In soft restraints.     Review of Systems    Unable to complete comprehensive ROS due to mental status/advanced dementia      Objective:      Vital signs in last 24 hours:  Patient Vitals for the past 24 hrs:   BP Temp Temp src Pulse Resp SpO2 Height Weight   21 0700    67 21 95 %     21 0600 (!) 114/40 98.2 °F (36.8 °C) Temporal (!) 45 16 94 %     21 0547 (!) 114/43   (!) 46 17      21 0531 (!) 117/52   (!) 47 16 94 %     21 0529    (!) 48 20 95 %     21 0512 (!) 123/51   59 21 95 %     21 0430 (!) 115/45   54 24 95 %     21 0400 126/66 98 °F (36.7 °C) Temporal 57 23 94 %  121 lb (54.9 kg)   21 0300 (!) 106/46   74 23 94 %     21 0200 (!) 109/47 98.1 °F (36.7 °C) Temporal 57 29 95 %     21 0100 (!) 105/47   60 16 96 %     21 0012    66       04/20/21 0000 (!) 113/54 98.2 °F (36.8 °C) Temporal 70 22 93 %     04/19/21 2300 (!) 117/53   59 23 95 %     04/19/21 2200 (!) 120/57 98 °F (36.7 °C) Temporal 65 25 94 %     04/19/21 2100 (!) 110/34   (!) 48 19 94 %     04/19/21 2000 (!) 112/53 98.6 °F (37 °C) Temporal 63 17 94 %     04/19/21 1900 (!) 107/44   55 16 96 %     04/19/21 1800 139/62   61 16 94 %     04/19/21 1700 (!) 119/59   53 21 95 %     04/19/21 1600 117/60 98.4 °F (36.9 °C) Temporal 64 16 95 %     04/19/21 1500 (!) 137/49   50 24      04/19/21 1400 (!) 135/52   58 26      04/19/21 1300 115/61   66 22      04/19/21 1200 (!) 144/52   59 23      04/19/21 1139 (!) 147/64 97.5 °F (36.4 °C) Temporal 67 16 91 %     04/19/21 0846 (!) 144/82 98.2 °F (36.8 °C)  86 18 96 % 5' 5\" (1.651 m) 130 lb (59 kg)     Physical exam    General: No acute distress, pleasantly confused  Psych: No agitation  Eyes: Normal conjunctive a, no scleral icterus  Neck: Trachea midline, supple  Respiratory: Normal effort, equal and symmetric chest rise, no use of accessory muscles  Cardiovascular: Regular rhythm, peripheral pulses equal  Extremities: No clubbing cyanosis or edema  Skin: Warm and dry  Neurologic: Awake and alert, oriented to person and place, disoriented to time      Lab Review   Recent Results (from the past 24 hour(s))   EKG 12 Lead    Collection Time: 04/19/21  9:20 AM   Result Value Ref Range    P-R Interval 160 ms    QRS Duration 86 ms    Q-T Interval 436 ms    QTc Calculation (Bazett) 444 ms    P Axis 50 degrees    T Axis 49 degrees   CBC Auto Differential    Collection Time: 04/19/21  9:32 AM   Result Value Ref Range    WBC 7.8 4.8 - 10.8 K/uL    RBC 4.91 4.20 - 5.40 M/uL    Hemoglobin 14.8 12.0 - 16.0 g/dL    Hematocrit 45.4 37.0 - 47.0 %    MCV 92.5 81.0 - 99.0 fL    MCH 30.1 27.0 - 31.0 pg    MCHC 32.6 (L) 33.0 - 37.0 g/dL    RDW 13.6 11.5 - 14.5 %    Platelets 621 036 - 166 K/uL MPV 11.8 9.4 - 12.3 fL    Neutrophils % 75.4 (H) 50.0 - 65.0 %    Lymphocytes % 17.9 (L) 20.0 - 40.0 %    Monocytes % 4.5 0.0 - 10.0 %    Eosinophils % 0.6 0.0 - 5.0 %    Basophils % 0.8 0.0 - 1.0 %    Neutrophils Absolute 5.9 1.5 - 7.5 K/uL    Immature Granulocytes # 0.1 K/uL    Lymphocytes Absolute 1.4 1.1 - 4.5 K/uL    Monocytes Absolute 0.40 0.00 - 0.90 K/uL    Eosinophils Absolute 0.10 0.00 - 0.60 K/uL    Basophils Absolute 0.10 0.00 - 0.20 K/uL   Comprehensive Metabolic Panel w/ Reflex to MG    Collection Time: 04/19/21  9:32 AM   Result Value Ref Range    Sodium 142 136 - 145 mmol/L    Potassium reflex Magnesium 4.4 3.5 - 5.0 mmol/L    Chloride 104 98 - 111 mmol/L    CO2 27 22 - 29 mmol/L    Anion Gap 11 7 - 19 mmol/L    Glucose 153 (H) 74 - 109 mg/dL    BUN 10 8 - 23 mg/dL    CREATININE 0.5 0.5 - 0.9 mg/dL    GFR Non-African American >60 >60    GFR African American >59 >59    Calcium 9.4 8.8 - 10.2 mg/dL    Total Protein 6.9 6.6 - 8.7 g/dL    Albumin 4.4 3.5 - 5.2 g/dL    Total Bilirubin 0.8 0.2 - 1.2 mg/dL    Alkaline Phosphatase 109 (H) 35 - 104 U/L    ALT 23 5 - 33 U/L    AST 32 5 - 32 U/L   Troponin    Collection Time: 04/19/21  9:32 AM   Result Value Ref Range    Troponin <0.01 0.00 - 0.03 ng/mL   Protime-INR    Collection Time: 04/19/21  9:32 AM   Result Value Ref Range    Protime 12.9 12.0 - 14.6 sec    INR 0.98 0.88 - 1.18   APTT    Collection Time: 04/19/21  9:32 AM   Result Value Ref Range    aPTT 27.3 26.0 - 36.2 sec   COVID-19, Rapid    Collection Time: 04/19/21 10:30 AM    Specimen: Nasopharyngeal Swab   Result Value Ref Range    SARS-CoV-2, NAAT Not Detected Not Detected   Urinalysis Reflex to Culture    Collection Time: 04/19/21 10:50 AM    Specimen: Urine, straight catheter   Result Value Ref Range    Color, UA YELLOW Straw/Yellow    Clarity, UA TURBID (A) Clear    Glucose, Ur Negative Negative mg/dL    Bilirubin Urine Negative Negative    Ketones, Urine Negative Negative mg/dL    Specific Gravity, UA 1.015 1.005 - 1.030    Blood, Urine TRACE (A) Negative    pH, UA 7.5 5.0 - 8.0    Protein, UA TRACE (A) Negative mg/dL    Urobilinogen, Urine 0.2 <2.0 E.U./dL    Nitrite, Urine Negative Negative    Leukocyte Esterase, Urine Negative Negative   Microscopic Urinalysis    Collection Time: 04/19/21 10:50 AM   Result Value Ref Range    Bacteria, UA NEGATIVE (A) None Seen /HPF    Crystals, UA NEG (A) None Seen /HPF    Hyaline Casts, UA 2 0 - 8 /HPF    WBC, UA 1 0 - 5 /HPF    RBC, UA 4 0 - 4 /HPF    Epithelial Cells, UA 1 0 - 5 /HPF   PROLACTIN    Collection Time: 04/19/21 11:44 AM   Result Value Ref Range    Prolactin 28.13 (H) 4.79 - 23.30 ng/mL   APTT    Collection Time: 04/19/21 11:44 AM   Result Value Ref Range    aPTT 26.5 26.0 - 36.2 sec   POCT Glucose    Collection Time: 04/19/21  2:21 PM   Result Value Ref Range    POC Glucose 132 (H) 70 - 99 mg/dl    Performed on [employer]    Basic Metabolic Panel w/ Reflex to MG    Collection Time: 04/20/21  2:46 AM   Result Value Ref Range    Sodium 140 136 - 145 mmol/L    Potassium reflex Magnesium 4.3 3.5 - 5.0 mmol/L    Chloride 102 98 - 111 mmol/L    CO2 23 22 - 29 mmol/L    Anion Gap 15 7 - 19 mmol/L    Glucose 133 (H) 74 - 109 mg/dL    BUN 9 8 - 23 mg/dL    CREATININE 0.5 0.5 - 0.9 mg/dL    GFR Non-African American >60 >60    GFR African American >59 >59    Calcium 9.1 8.8 - 10.2 mg/dL   CBC auto differential    Collection Time: 04/20/21  2:46 AM   Result Value Ref Range    WBC 12.8 (H) 4.8 - 10.8 K/uL    RBC 4.23 4.20 - 5.40 M/uL    Hemoglobin 12.7 12.0 - 16.0 g/dL    Hematocrit 38.9 37.0 - 47.0 %    MCV 92.0 81.0 - 99.0 fL    MCH 30.0 27.0 - 31.0 pg    MCHC 32.6 (L) 33.0 - 37.0 g/dL    RDW 13.4 11.5 - 14.5 %    Platelets 622 584 - 323 K/uL    MPV 11.8 9.4 - 12.3 fL    Neutrophils % 84.5 (H) 50.0 - 65.0 %    Lymphocytes % 9.7 (L) 20.0 - 40.0 %    Monocytes % 5.3 0.0 - 10.0 %    Eosinophils % 0.0 0.0 - 5.0 %    Basophils % 0.2 0.0 - 1.0 %    Neutrophils Absolute 10.8 (H) 1.5 - 7.5 K/uL    Immature Granulocytes # 0.0 K/uL    Lymphocytes Absolute 1.2 1.1 - 4.5 K/uL    Monocytes Absolute 0.70 0.00 - 0.90 K/uL    Eosinophils Absolute 0.00 0.00 - 0.60 K/uL    Basophils Absolute 0.00 0.00 - 0.20 K/uL       I/O last 3 completed shifts: In: 5049 [I.V.:1333; IV Piggyback:200]  Out: 850 [Urine:850]  No intake/output data recorded.       Current Facility-Administered Medications:     atorvastatin (LIPITOR) tablet 40 mg, 40 mg, Oral, Nightly, Claudia Chester MD    cholestyramine light packet 4 g, 4 g, Oral, Daily, Claudia Chester MD    [Held by provider] dextromethorphan-quiNIDine (NUEDEXTA) 20-10 MG per capsule 1 capsule, 1 capsule, Oral, Daily, Claudia Chester MD  [facility] [place] by provider] meclizine (ANTIVERT) tablet 25 mg, 25 mg, Oral, TID PRN, Claudia Chester MD  [facility] [place] by provider] QUEtiapine (SEROQUEL) tablet 25 mg, 25 mg, Oral, Nightly, Claudia Chester MD    vitamin D (ERGOCALCIFEROL) capsule 50,000 Units, 50,000 Units, Oral, Weekly, Claudia Chester MD    levETIRAcetam (KEPPRA) 500 mg/100 mL IVPB, 500 mg, Intravenous, Q12H, Claudia Chester MD, Stopped at 04/19/21 2200    LORazepam (ATIVAN) injection 2 mg, 2 mg, Intravenous, Q4H PRN, Claudia Chester MD    sodium chloride flush 0.9 % injection 5-40 mL, 5-40 mL, Intravenous, 2 times per day, Claudia Chester MD, 10 mL at 04/19/21 2145    sodium chloride flush 0.9 % injection 5-40 mL, 5-40 mL, Intravenous, PRN, Claudia Chester MD    0.9 % sodium chloride infusion, 25 mL, Intravenous, PRN, Claudia Chester MD    promethazine (PHENERGAN) tablet 12.5 mg, 12.5 mg, Oral, Q6H PRN **OR** ondansetron (ZOFRAN) injection 4 mg, 4 mg, Intravenous, Q6H PRN, Claudia Chester MD, 4 mg at 04/19/21 1120    acetaminophen (TYLENOL) tablet 650 mg, 650 mg, Oral, Q6H PRN **OR** acetaminophen (TYLENOL) suppository 650 mg, 650 mg, Rectal, Q6H PRN, Claudia Chester MD    0.9 % sodium chloride infusion, , Intravenous, Continuous,

## 2021-04-20 NOTE — ACP (ADVANCE CARE PLANNING)
Advance Care Planning     Advance Care Planning Activator (Inpatient)  Conversation Note      Date of ACP Conversation: 4/20/21    Conversation Conducted with: Merlinda Ni, son/POA. ACP Activator: Merle Collins    }    Health Care Decision Maker:     Current Designated Health Care Decision Maker:     Primary Decision Maker: Axel Sheridan Child - [phone]      Care Preferences    Ventilation: \"If you were in your present state of health and suddenly became very ill and were unable to breathe on your own, what would your preference be about the use of a ventilator (breathing machine) if it were available to you? \"      Would the patient desire the use of ventilator (breathing machine)?: No          Resuscitation  \"In the event your heart stopped as a result of an underlying serious health condition, would you want attempts to be made to restart your heart (answer \"yes\" for attempt to resuscitate) or would you prefer a natural death (answer \"no\" for do not attempt to resuscitate)? \" No        Conversation Outcomes:  [x] ACP discussion completed  [] Existing advance directive reviewed with patient; no changes to patient's previously recorded wishes  [] New Advance Directive completed  [] Portable Do Not Rescitate prepared for Provider review and signature  [] POLST/POST/MOLST/MOST prepared for Provider review and signature      Electronically signed by Merle Collins RN on 4/20/2021 at 11:19 AM

## 2021-04-21 LAB
ANION GAP SERPL CALCULATED.3IONS-SCNC: 11 MMOL/L (ref 7–19)
BASOPHILS ABSOLUTE: 0 K/UL (ref 0–0.2)
BASOPHILS RELATIVE PERCENT: 0.3 % (ref 0–1)
BUN BLDV-MCNC: 8 MG/DL (ref 8–23)
CALCIUM SERPL-MCNC: 9 MG/DL (ref 8.8–10.2)
CHLORIDE BLD-SCNC: 105 MMOL/L (ref 98–111)
CO2: 24 MMOL/L (ref 22–29)
CREAT SERPL-MCNC: 0.5 MG/DL (ref 0.5–0.9)
EOSINOPHILS ABSOLUTE: 0 K/UL (ref 0–0.6)
EOSINOPHILS RELATIVE PERCENT: 0 % (ref 0–5)
GFR AFRICAN AMERICAN: >59
GFR NON-AFRICAN AMERICAN: >60
GLUCOSE BLD-MCNC: 125 MG/DL (ref 74–109)
HCT VFR BLD CALC: 36.4 % (ref 37–47)
HEMOGLOBIN: 11.8 G/DL (ref 12–16)
IMMATURE GRANULOCYTES #: 0.1 K/UL
LYMPHOCYTES ABSOLUTE: 1.6 K/UL (ref 1.1–4.5)
LYMPHOCYTES RELATIVE PERCENT: 13.6 % (ref 20–40)
MCH RBC QN AUTO: 30.3 PG (ref 27–31)
MCHC RBC AUTO-ENTMCNC: 32.4 G/DL (ref 33–37)
MCV RBC AUTO: 93.3 FL (ref 81–99)
MONOCYTES ABSOLUTE: 0.9 K/UL (ref 0–0.9)
MONOCYTES RELATIVE PERCENT: 8 % (ref 0–10)
NEUTROPHILS ABSOLUTE: 8.9 K/UL (ref 1.5–7.5)
NEUTROPHILS RELATIVE PERCENT: 77.7 % (ref 50–65)
PDW BLD-RTO: 13.3 % (ref 11.5–14.5)
PLATELET # BLD: 141 K/UL (ref 130–400)
PMV BLD AUTO: 12.5 FL (ref 9.4–12.3)
POTASSIUM REFLEX MAGNESIUM: 3.8 MMOL/L (ref 3.5–5)
RBC # BLD: 3.9 M/UL (ref 4.2–5.4)
SODIUM BLD-SCNC: 140 MMOL/L (ref 136–145)
WBC # BLD: 11.5 K/UL (ref 4.8–10.8)

## 2021-04-21 PROCEDURE — 97129 THER IVNTJ 1ST 15 MIN: CPT

## 2021-04-21 PROCEDURE — 6360000002 HC RX W HCPCS: Performed by: STUDENT IN AN ORGANIZED HEALTH CARE EDUCATION/TRAINING PROGRAM

## 2021-04-21 PROCEDURE — 97166 OT EVAL MOD COMPLEX 45 MIN: CPT

## 2021-04-21 PROCEDURE — 36415 COLL VENOUS BLD VENIPUNCTURE: CPT

## 2021-04-21 PROCEDURE — 6370000000 HC RX 637 (ALT 250 FOR IP): Performed by: STUDENT IN AN ORGANIZED HEALTH CARE EDUCATION/TRAINING PROGRAM

## 2021-04-21 PROCEDURE — 85025 COMPLETE CBC W/AUTO DIFF WBC: CPT

## 2021-04-21 PROCEDURE — 99233 SBSQ HOSP IP/OBS HIGH 50: CPT | Performed by: PSYCHIATRY & NEUROLOGY

## 2021-04-21 PROCEDURE — 80048 BASIC METABOLIC PNL TOTAL CA: CPT

## 2021-04-21 PROCEDURE — 1210000000 HC MED SURG R&B

## 2021-04-21 PROCEDURE — 97110 THERAPEUTIC EXERCISES: CPT

## 2021-04-21 PROCEDURE — 92526 ORAL FUNCTION THERAPY: CPT

## 2021-04-21 PROCEDURE — 97535 SELF CARE MNGMENT TRAINING: CPT

## 2021-04-21 PROCEDURE — 97116 GAIT TRAINING THERAPY: CPT

## 2021-04-21 PROCEDURE — 51702 INSERT TEMP BLADDER CATH: CPT

## 2021-04-21 PROCEDURE — 99231 SBSQ HOSP IP/OBS SF/LOW 25: CPT | Performed by: NEUROLOGICAL SURGERY

## 2021-04-21 PROCEDURE — 51798 US URINE CAPACITY MEASURE: CPT

## 2021-04-21 RX ADMIN — LEVETIRACETAM 500 MG: 5 INJECTION INTRAVENOUS at 21:38

## 2021-04-21 RX ADMIN — LEVETIRACETAM 500 MG: 5 INJECTION INTRAVENOUS at 08:50

## 2021-04-21 RX ADMIN — QUETIAPINE FUMARATE 25 MG: 25 TABLET ORAL at 21:38

## 2021-04-21 ASSESSMENT — PAIN SCALES - GENERAL: PAINLEVEL_OUTOF10: 0

## 2021-04-21 NOTE — CARE COORDINATION
[facility]  Notification of Admission Decision      [] Patient has been accepted for admit to [facility] on :       Please write discharge orders and summary prior to discharge. [] Patient acceptance to Rehab pending the following :    [] Eval in progress       [x] Patient determined to be ineligible for services at [facility] because : patient has advanced dementia and isn't able to consistently follow commands. Feel she will need a much longer stay than Rehab can provide to be able to return to [place]. Feel she will need to transition to SNF level of care. We recommend you consider: SNF        Thank you for your referral, we appreciate you. If you have any questions, please feel   free to contact me at [phone].     Electronically Signed by Shawn Apodaca Admissions Coordinator 4/21/2021 12:45 PM

## 2021-04-21 NOTE — CARE COORDINATION
LUDWIG went over choice list with Pt son of facilities in the area. Pt son has requested Pt to be listed with [facility].  LUDWIG has notified the facility of the referral. Awaiting approval/denial.  [facility]  [phone]  Electronically signed by Jonny Martino on 4/21/2021 at 1:42 PM

## 2021-04-21 NOTE — PROGRESS NOTES
Lubin removed at 1500 per Dr. Paola Medina request. Pt DTV.     Electronically signed by Venus Krause RN on 4/21/21 at 5:16 PM CDT

## 2021-04-21 NOTE — PROGRESS NOTES
Occupational Therapy   Occupational Therapy Initial Assessment  Date: 2021   Patient Name: Sheliah Crigler  MRN: [id number]     : 1945    Date of Service: 2021    Discharge Recommendations:  Patient would benefit from continued therapy after discharge(Plan to D/C back to [facility] dementia care unit.)  OT Equipment Recommendations  Equipment Needed: No    Assessment   Assessment: OT evaluation compelted and tx intiated. Pt's baseline cognitive functioning and balance during BADLs indicates possible need for further tx. Pt may see improvement with independence in BADLs with ongoing therapy. Pt is unsafe to D/C to any location without full  assistance. OT Education: Transfer Training;Orientation; ADL Adaptive Strategies  Patient Education: Education limited to simplified verbal/physical/contextual clues. Pt will need ongoing re-orientation/education. Barriers to Learning: Baseline dementia. REQUIRES OT FOLLOW UP: Yes  Activity Tolerance  Activity Tolerance: Treatment limited secondary to decreased cognition  Safety Devices  Safety Devices in place: Yes  Type of devices: Bed alarm in place;Call light within reach; Left in bed;Nurse notified  Restraints  Initially in place: Yes  Restraints: Removed/applied by nsg. Patient Diagnosis(es): The primary encounter diagnosis was Fall from standing, initial encounter. Diagnoses of Loss of consciousness ([facility] [address] Closed head injury, initial encounter, Traumatic subdural hemorrhage with loss of consciousness of 30 minutes or less, initial encounter [facility]), Traumatic subarachnoid hemorrhage with loss of consciousness of 30 minutes or less, initial encounter ([facility] [address] Dementia with behavioral disturbance, unspecified dementia type ([address] and Laceration of scalp, initial encounter were also pertinent to this visit.      has a past medical history of ADD (attention deficit disorder), Carotid atherosclerosis, Degeneration of cervical intervertebral disc, Mixed hyperlipidemia, Nontoxic multinodular goiter, OA (osteoarthritis), Osteopenia, Palliative care patient, Panic disorder without agoraphobia, Peripheral neuropathy, Pure hypercholesterolemia, Urinary incontinence, and Vitamin D deficiency. has a past surgical history that includes Vagina surgery; Total knee arthroplasty (Right, 2011); Total knee arthroplasty (Left, 2012); Hysterectomy, total abdominal; Colonoscopy; and joint replacement. Restrictions  Restrictions/Precautions  Restrictions/Precautions: Fall Risk, Seizure(Has restraints in place; nsg removed and replaced.)  Required Braces or Orthoses?: No    Subjective   General  Chart Reviewed: Yes  Patient assessed for rehabilitation services?: Yes  Additional Pertinent Hx: Advanced dementia; (B) TKA; neuropathy; incontinence; goiter. Family / Caregiver Present: No  Diagnosis: subarachnoid hemorrahage; posterior occipital trauma (both due to fall). Patient Currently in Pain: Other (comment)(Does not present as being in pain.)  Pain Assessment  Pain Assessment: Faces  Pain Level: 0  Vital Signs  Patient Currently in Pain: Other (comment)(Does not present as being in pain.)  Social/Functional History  Social/Functional History  Type of Home: Assisted living  Home Equipment: Rolling walker  Additional Comments: Pt is from [facility] in memory unit; pt states she ambulated w RW but unsure of complete PLOF due to confusion       Objective     OT checked with nsg and pt was cleared for therapy. Nsg removed and re-applied restraints.    Vision: Within Functional Limits(Followed movements/visually tracked.)  Hearing: Within functional limits(Responded unintelligibly to vocal stimuli.)    Orientation  Overall Orientation Status: Impaired  Orientation Level: Disoriented X4     Standing Balance  Activity: Stood EOB with HHAx2 briefly (2 min); took one side-step; major (B) ankle inversion noted  Toilet Transfers  Toilet - Technique: Stand step  Equipment Used: Standard bedside commode  Toilet Transfer: Minimal assistance  Toilet Transfers Comments: Currently pt has figueroa cath; for BM OT recommends use of BSC. ADL  Feeding: Maximum assistance  Grooming: Maximum assistance  UE Bathing: Maximum assistance  LE Bathing: Dependent/Total  UE Dressing: Maximum assistance  LE Dressing: Dependent/Total  Toileting: Dependent/Total  Additional Comments: Advanced dementia is the most observed limiting factor. Pt unaware need to perform these actions and cannot follow commands. Bed mobility  Supine to Sit: Moderate assistance  Sit to Supine: Moderate assistance  Transfers  Stand Step Transfers: Minimal assistance  Transfer Comments: did follow contextual cues to complete; HHAx2     Cognition  Overall Cognitive Status: Exceptions  Arousal/Alertness: Inconsistent responses to stimuli  Following Commands: Inconsistently follows commands  Attention Span: Difficulty attending to directions  Memory: (Unable to vocalize or communicate any info that would assess memory.)  Safety Judgement: Decreased awareness of need for assistance  Problem Solving: Decreased awareness of errors  Insights: Not aware of deficits  Initiation: Requires cues for all  Sequencing: Requires cues for all  Cognition Comment: Pt was in restraints pre/post tx due to her pulling on figueroa cath. LUE PROM (degrees)  LUE PROM: WFL  RUE PROM (degrees)  RUE PROM: WFL                 Tx Initiated   OT provided education to pt via simplified contextual, verbal, and physical cues for transfer training, ADL safety/adaptations. Cognitive re-orientation also provided to address further confusion/possible hospital-related delirium. Physical assist given during bed mobility, standing/sitting balance, and transfers (total time: 15 min).      Plan   Plan  Times per week: 1-3  Current Treatment Recommendations: Cognitive/Perceptual Training, Cognitive Reorientation, Functional Mobility Training, Patient/Caregiver Education & Training, Balance Training, Safety Education & Training, Positioning, Strengthening, Self-Care / ADL, Equipment Evaluation, Education, & procurement, Home Management Training, Endurance Training    G-Code     OutComes Score                                                  AM-PAC Score             Goals  Short term goals  Time Frame for Short term goals: 1-2 weeks  Short term goal 1: Complete pre-ADL purposeful movements/activities with min A for execution. Short term goal 2: Roll R and L with min A to assist with bedlevel ADLs. Short term goal 3: Complete transfers with CGA and PRN DME. Long term goals  Long term goal 1: Upgrade as tolerated.        Therapy Time   Individual Concurrent Group Co-treatment   Time In           Time Out           Minutes                   EDUARDO Lopez Electronically signed by EDUARDO Lopez on 4/21/2021 at 8:59 AM

## 2021-04-21 NOTE — PROGRESS NOTES
[facility]  Progress Note    LAST 24 HOURS: Patient seen and examined. Now on floor. Mrs. Suresh Henson is resting in bed this morning, a little more drowsy than yesterday, however, she did just wake up. She cannot state year or location. She denies any headaches. Her son is at bedside. CHIEF COMPLAINT:  Fall at assisted living facility, head truma    HISTORY OF PRESENT ILLNESS:      The patient is a 67 y.o. female who presents to the ED following a fall today at [facility] assisted living facility. She has a history of dementia is not the best historian. She is unable to recall the event or any of her medical history. She denies any HAs. She sustained posterior occipital head trauma which required staples in her scalp. CT head revealed a left frontal and right convexity traumatic subarachnoid hemorrhage for which I was asked to evaluate. The patient does not take anticoagulation medication.        Past Medical History:   Diagnosis Date    ADD (attention deficit disorder)     Carotid atherosclerosis     Degeneration of cervical intervertebral disc     Mixed hyperlipidemia     Nontoxic multinodular goiter     OA (osteoarthritis)     Osteopenia     Palliative care patient 04/20/2021    Panic disorder without agoraphobia     Peripheral neuropathy     Pure hypercholesterolemia     Urinary incontinence     Vitamin D deficiency        Past Surgical History:   Procedure Laterality Date    COLONOSCOPY      HYSTERECTOMY, TOTAL ABDOMINAL      w/removal of both ovaries    JOINT REPLACEMENT      knee    TOTAL KNEE ARTHROPLASTY Right 2011    TOTAL KNEE ARTHROPLASTY Left 2012    VAGINA SURGERY      Abdomino-Vaginal Vesical Neck Suspension        Medications    Current Facility-Administered Medications:     enalaprilat (VASOTEC) injection 1.25 mg, 1.25 mg, Intravenous, Q6H PRN, Katt Florez MD    atorvastatin (LIPITOR) tablet 40 mg, 40 mg, Oral, Nightly, MD Kita Sullivan (DUONEB) nebulizer solution 1 ampule, 1 ampule, Inhalation, Q4H PRN, Estela Rahman MD  Latex, Alcohol, Astelin [azelastine hcl], Ciprofloxacin hcl, Fluticasone propionate, Hydroquinone, Livalo [pitavastatin], Other, and Vitamin b complex [b-100]    Social History  Social History     Tobacco Use   Smoking Status Former Smoker    Packs/day: 2.50    Years: 5.00    Pack years: 12.50    Quit date: 200    Years since quittin.3   Smokeless Tobacco Never Used     Social History     Substance and Sexual Activity   Alcohol Use Never    Frequency: Never         Family History   Problem Relation Age of Onset    Cancer Mother         Hodgkins Lymphoma, AML    Breast Cancer Mother     Heart Disease Other          REVIEW OF SYSTEMS:  Constitutional: No fevers No chills  Neck:No stiffness  Respiratory: No shortness of breath  Cardiovascular: No chest pain No palpitations  Gastrointestinal: No abdominal pain    Genitourinary: No Dysuria  Neurological: No headache, + confusion    PHYSICAL EXAM:  Vitals:    21 0641   BP: (!) 131/55   Pulse: 55   Resp: 20   Temp: 96.9 °F (36.1 °C)   SpO2: 94%       Constitutional: The patient appears well-developed and well-nourished. Eyes  conjunctiva normal.  Conjugate Gaze  Ear, nose, throat - No scars, masses, or lesions over external nose or ears, no atrophy of tongue  Neck-symmetric, no masses noted, no jugular vein distension  Respiration- chest wall appears symmetric, good expansion, normal effort without use of accessory muscles  Musculoskeletal  no significant wasting of muscles noted, no bony deformities  Extremities-no clubbing, cyanosis or edema  Skin  warm, dry, and intact. No rash, erythema, or pallor.       Neurologic Examination  Awake, Alert, unable to state year, location or president  Normal speech pattern, following commands  Motor 5/5 all extremities, no clear drift  PERRL, EOMI  No deficits to light touch       DATA:  Nursing/pcp notes, imaging,labs and vitals reviewed. PT,OT and/or speech notes reviewed    Lab Results   Component Value Date    WBC 11.5 (H) 04/21/2021    HGB 11.8 (L) 04/21/2021    HCT 36.4 (L) 04/21/2021    MCV 93.3 04/21/2021     04/21/2021     Lab Results   Component Value Date     04/21/2021    K 3.8 04/21/2021     04/21/2021    CO2 24 04/21/2021    BUN 8 04/21/2021    CREATININE 0.5 04/21/2021    GLUCOSE 125 (H) 04/21/2021    CALCIUM 9.0 04/21/2021    PROT 6.9 04/19/2021    LABALBU 4.4 04/19/2021    BILITOT 0.8 04/19/2021    ALKPHOS 109 (H) 04/19/2021    AST 32 04/19/2021    ALT 23 04/19/2021    LABGLOM >60 04/21/2021    GFRAA >59 04/21/2021     Lab Results   Component Value Date    INR 0.98 04/19/2021    PROTIME 12.9 04/19/2021     EXAMINATION: CT head without contrast 4/19/2021   HISTORY: Posterior head injury. Question seizure. FINDINGS: Multiple contiguous axial views are obtained from the skull   base to the vertex per protocol without intravenous contrast   enhancement with reformatted images obtained in the sagittal and   coronal projections from the original data set. There is atrophy of the brain with prominence of the subarachnoid   spaces and ventricular enlargement. Small vessel ischemic disease is   noted involving the periventricular and higher white matter tracts. On images through the right parietal vertex there is evidence of   subarachnoid hemorrhage which is demonstrated on images 24 through 26   of the axial sequence. There is also left frontal hemorrhage including   what I suspect to be predominantly subarachnoid hemorrhage overlying   the left frontal cortex as well as a small amount of blood within the   anterior interhemispheric fissure within the subdural space measuring   5 mm in thickness. There is no associated mass effect. There is no   associated calvarial injury. There is no mass effect or shift of the   midline.  No evidence of acute infarct.       Impression   1.. Left frontal hemorrhage including a small amount of blood within   the anterior interhemispheric fissure within the subdural space as   well as subarachnoid hemorrhage overlying the left frontal cortex. Some contusional hemorrhage of the left frontal cortex is also   suspected. Also demonstrated is subarachnoid hemorrhage high within   the right parietal vertex. There is no associated mass effect or shift   of the midline. 2. Atrophy with small vessel ischemic disease. 3. I spoke with Dr. Di Tejeda in the ER at 9:30 AM concerning the   findings of hemorrhage within the right parietal vertex and left   frontal lobe. Signed by Dr Janay Loomis on 4/19/2021 9:34 AM           Narrative   CT head 4/20/2021 4:02 AM   HISTORY: Follow-up subarachnoid hemorrhage   TECHNIQUE: Axial images of the head were obtained without IV contrast.   Coronal and sagittal reformatted images are reconstructed and   reviewed. COMPARISON: 4/19/2021. DLP: 833 mGy cm Automated exposure control was utilized to minimize   patient radiation dose. FINDINGS:    There is similar to slightly increasing subarachnoid hemorrhage along   the convexities of the left greater than right cerebral hemisphere. A   small subdural hemorrhage along the intercerebral falx (measured on   image 25) measures 3 mm. There is a similar small left frontal   contusion considered. . Chronic small vessel ischemic changes are   noted. An 8 mm calcification in the extra-axial space adjacent the   anterior left frontal lobe may be subarachnoid granulation   calcification, incidental calcified meningioma considered. Hyperostosis frontalis internus. No skull fracture.       Impression   1. Similar to slightly increased subarachnoid hemorrhage along the   convexities of the left greater than right sulci with small subdural   hemorrhage along the intercerebral falx measuring 3 mm in greatest   width. No midline shifting. Questionable subcentimeter left frontal   contusion.    Signed by Dr Jeny Metz on 4/20/2021 7:42 AM           IMPRESSION  67year old female, hx of dementia, s/p fall at her assisted living facility with traumatic subarachnoid hemorrhage noted on CT    RECOMMENDATIONS:    Spoke with son regarding Mrs. Nora Benavidez. He is wanting to discuss possible disposition nursing home, rehab, versus returning to assisted living.     Remains non-surgical  PT/OT to assess   Maintain SBP   Keppra 500 BID x 7 days (started on 4/19/2021)  Case management referral for disposition planning      Chinmay Keating APRN

## 2021-04-21 NOTE — PROGRESS NOTES
Physical Therapy  Nisreen Alcantara  [id number]     04/21/21 9930   Restrictions/Precautions   Restrictions/Precautions Fall Risk  (Has restraints in place; nsg removed and replaced.)   Required Braces or Orthoses? No   Subjective   Subjective Agreeable to work with therapy. Bed Mobility   Supine to Sit Moderate assistance  (x2)   Sit to Supine Moderate assistance  (x2)   Transfers   Sit to Stand Minimal Assistance;2 Person Assistance   Stand to sit Minimal Assistance;2 Person Assistance   Ambulation   Ambulation? Yes   WB Status WBAT   Ambulation 1   Surface level tile   Device Hand-Held Assist  (x2)   Other Apparatus   (figueroa, IV)   Assistance Minimal assistance;2 Person assistance   Quality of Gait Narrow PARISA, Bilateral hip IR, almost scissoring at times   Distance 3'   Comments steps towards [facility]   Exercises   Hip Flexion 10   Hip Abduction 10   Knee Long Arc Quad 10   Ankle Pumps 10   Comments AAROM B LE ex's in sitting   Other Activities   Comment Patient did well with therapy. Answers yes/no questions. Patient returned to supine, positioned for comfort with all needs in reach and B wrist restraints reapplied. Short term goals   Time Frame for Short term goals 2 weeks   Short term goal 1 Supine<>sit, sup   Short term goal 2 STS, sup   Short term goal 3 Amb 150ft w RW, CGA   Activity Tolerance   Activity Tolerance Patient Tolerated treatment well;Patient limited by cognitive status   Safety Devices   Type of devices Bed alarm in place;Call light within reach; Left in bed   Electronically signed by Corinne Manas, PTA on 4/21/2021 at 8:38 AM

## 2021-04-21 NOTE — PROGRESS NOTES
Speech Language Pathology  Facility/Department: [facility] SURG SERVICES  SWALLOW THERAPY  SPEECH LANGUAGE THERAPY       NAME: Griselda Paz  : 1945  MRN: [id number]    ADMISSION DATE: 2021  ADMITTING DIAGNOSIS: has Pure hypercholesterolemia; Generalized anxiety disorder; Vitamin D deficiency; Persistent insomnia; Impaired fasting glucose; Carotid artery stenosis; Osteopenia of both lower legs; Fatigue; Chronic vertigo; Nontoxic multinodular goiter; OA (osteoarthritis) of knee; Allergic rhinitis; Attention deficit disorder (ADD) without hyperactivity; Auditory hallucinations; Carotid atherosclerosis; Cervicalgia; Degeneration of cervical intervertebral disc; Disc degeneration, lumbosacral; Dizziness; Dysuria; Hypercalcemia; Lactose intolerance; Menopausal symptoms; Microhematuria; Palpitations; Panic disorder without agoraphobia; Paroxysmal hypertension; Peripheral neuropathy; Urine incontinence; Benign paroxysmal positional vertigo due to bilateral vestibular disorder; Unspecified hearing loss, bilateral; Tinnitus aurium, bilateral; Vertigo, benign paroxysmal, right; Late onset Alzheimer's disease without behavioral disturbance ([address] Nail fungal infection; AMS (altered mental status); LOC (loss of consciousness) ([address] Witnessed seizure-like activity ([address] Traumatic subarachnoid hemorrhage ([address] Head injury; Palliative care patient; and Punctate hemorrhage of left frontal lobe ([address] on their problem list.    Date of Treat: 2021  Evaluating Therapist: Dandre Jurado    Current Diet level:  Puree consistency with thin liquids    Reason for Referral  Rolf LEMA Mindy Holm was referred for a bedside swallow evaluation to assess the efficiency of her swallow function, identify signs and symptoms of aspiration and make recommendations regarding safe dietary consistencies, effective compensatory strategies, and safe eating environment. Impression  Re-assessed patient's swallowing function.  Patient exhibits slow, decreased oral prep of more solid consistencies as well as sluggish, moderately decreased laryngeal elevation for swallow airway protection. Even so, no outward S/S penetration/aspiration was noted with any puree consistency presentation, regular solid consistency trial, or thin H2O presentation administered during treatment session this date.     At this time, would trial minced and moist consistency. Continue thin liquids. TOTAL FEED ALTERNATING EVERY BITE OF FOOD WITH A DRINK. Recommend meds crushed in pudding/applesauce. Will continue to follow.     Treatment Plan  Requires SLP Intervention: Yes     Recommended Diet and Intervention  Diet Solids Recommendation: Minced and moist  Liquid Consistency Recommendation: Thin  Recommended Form of Meds: Meds crushed in puree as able  Therapeutic Interventions: Patient/Family education;Oral Care;Diet Tolerance Monitoring; Therapeutic PO trials with SLP     Treatment/Goals  Timeframe for Short-term Goals: 1-2x/day for 3 days   Goal 1: Patient will tolerate minced and moist consistency and thin liquids with min S/S penetration/aspiration during PO intake. Goal 2: Patient staff will follow swallow safety recommendations to decrease risk of penetration/aspiration during PO intake.   Goal 3: Patient will receive daily oral care, via staff, to decrease bacteria from the oral cavity.   Goal 4: Re-assessment of swallow function for potential diet upgrade. Goal 5: Monitor [id number] functioning.     General  Chart Reviewed: Yes  Behavior/Cognition: Alert;Confused  Dentition: Adequate  Patient Positioning: Upright in bed  Consistencies Administered: Dysphagia Pureed (Dysphagia I); Regular solid; Thin - straw     Re-assessed patient's swallowing function with the following observations noted:     Oral Phase  Mastication: Regular solid (Patient exhibited slow oral prep with decreased rotary jaw movement noted during regular solid consistency trials presented by SLP.)  Suspected Premature Bolus Loss: Puree;Regular solid (Patient exhibited slow oral transit of puree consistency presentations and regular solid consistency trials all presented by SLP.)  Oral Phase - Comment: No puree consistency residue was noted post swallows. Min regular solid consistency residue was observed post swallows; residue cleared from the mouth with additional dry swallows. Oral transit of thin H2O presentations, administered via straw by LSP, primarily measured 1-2 seconds in length.      Pharyngeal Phase  Suspected Swallow Delay: Puree;Regular solid (Suspect secondary to oral transit times.)  Laryngeal Elevation: (Patient exhibited sluggish, moderately decreased laryngeal elevation for swallow airway protection.)  Pharyngeal Phase - Comment: No outward S/S penetration/aspiration was noted with any puree consistency presentation, regular solid consistency trial, or thin H2O presentation administered during treatment session this date.     At this time, would trial minced and moist consistency. Continue thin liquids. TOTAL FEED ALTERNATING EVERY BITE OF FOOD WITH A DRINK. Recommend meds crushed in pudding/applesauce. Will continue to follow. Additional Assessments:    Objective: Auditory Comprehension  Comprehension:  (Delays were noted and significant break down was observed during simple yes/no questions regarding immediate environment and current state of being.  Delays were noted and moderate-severe break down was observed during simple 1 step commands.)     Expression  Primary Mode of Expression:  (Confrontation naming of items, structured responsive speech, and responses in natural conversation were all considered to be impaired with frequent instances of patient stating, \"what?\", no responses, and frequent repetition of questions/prompts.)     Motor Speech:  (Patient exhibited decreased volume of speech and decreased labial movements during verbalizations.)     Overall

## 2021-04-21 NOTE — PROGRESS NOTES
[facility]        Hospitalist Progress Note  4/21/2021 2:30 PM  Subjective:   Admit Date: 4/19/2021  PCP: Luis Nation MD    Chief Complaint: Fall    Subjective: Patient seen and examined at bedside. AAO x1 (person). States she feels well. Denies chest pain or shortness breath. Cumulative Hospital History: 71-year-old female with advanced dementia presented to the ED 4/19 following a fall at [facility] with reported witnessed seizure-like activity. Staff at [facility] noted patient briefly lost consciousness while ambulating and fell backwards and hit the back of her head. Admitted with traumatic subarachnoid hemorrhage after CT head showed left frontal hemorrhage, but no mass-effect or shift. Patient initially monitored in ICU and did require soft restraints for safety. Developed sinus bradycardia overnight however heart rate fluctuating 40s60s. Repeat CT head a.m. 4/20 shows similar to slightly increased subarachnoid hemorrhage, and new small subdural hemorrhage, but no midline shift. Hemorrhage nonsurgical.  Neurosurgery and neurology following. Patient remained stable and transferred to the floor. ROS: 14 point review of systems is negative except as specifically addressed above. DIET DYSPHAGIA MINCED AND MOIST;     Intake/Output Summary (Last 24 hours) at 4/21/2021 1430  Last data filed at 4/21/2021 0830  Gross per 24 hour   Intake 1308 ml   Output 500 ml   Net 808 ml     Medications:   sodium chloride      sodium chloride 75 mL/hr at 04/20/21 0600     Current Facility-Administered Medications   Medication Dose Route Frequency Provider Last Rate Last Admin    enalaprilat (VASOTEC) injection 1.25 mg  1.25 mg Intravenous Q6H PRN Neto Hager MD        atorvastatin (LIPITOR) tablet 40 mg  40 mg Oral Nightly Neto Hager MD        cholestyramine light packet 4 g  4 g Oral Daily Neto Hager MD        [Held by provider] dextromethorphan-quiNIDine (NUEDEXTA) 20-10 MG per capsule 1 capsule  1 capsule Oral Daily Aidan Kidd MD        [Held by provider] meclizine (ANTIVERT) tablet 25 mg  25 mg Oral TID PRN Aidan Kidd MD        QUEtiapine (SEROQUEL) tablet 25 mg  25 mg Oral Nightly Aidan Kidd MD   25 mg at 04/20/21 2102    vitamin D (ERGOCALCIFEROL) capsule 50,000 Units  50,000 Units Oral Weekly Aidan Kidd MD        levETIRAcetam (KEPPRA) 500 mg/100 mL IVPB  500 mg Intravenous Q12H Aidan Kidd MD   Stopped at 04/21/21 0928    LORazepam (ATIVAN) injection 2 mg  2 mg Intravenous Q4H PRN Aidan Kidd MD        sodium chloride flush 0.9 % injection 5-40 mL  5-40 mL Intravenous 2 times per day Aidan Kidd MD   10 mL at 04/20/21 0840    sodium chloride flush 0.9 % injection 5-40 mL  5-40 mL Intravenous PRN Aidan Kidd MD        0.9 % sodium chloride infusion  25 mL Intravenous PRN Aidan Kidd MD        promethazine (PHENERGAN) tablet 12.5 mg  12.5 mg Oral Q6H PRN Aidan Kidd MD        Or    ondansetron [facility]) injection 4 mg  4 mg Intravenous Q6H PRN Aidan Kidd MD   4 mg at 04/19/21 1120    acetaminophen (TYLENOL) tablet 650 mg  650 mg Oral Q6H PRN Aidan Kidd MD        Or   [address] [place] [address] acetaminophen (TYLENOL) suppository 650 mg  650 mg Rectal Q6H PRN Aidan Kidd MD        0.9 % sodium chloride infusion   Intravenous Continuous Aidan Kidd MD 75 mL/hr at 04/20/21 0600 Rate Verify at 04/20/21 0600    potassium chloride (KLOR-CON M) extended release tablet 40 mEq  40 mEq Oral PRN Aidan Kidd MD        Or    potassium bicarb-citric acid (EFFER-K) effervescent tablet 40 mEq  40 mEq Oral PRN Aidan Kidd MD        Or    potassium chloride 10 mEq/100 mL IVPB (Peripheral Line)  10 mEq Intravenous PRN Aidan Kidd MD        magnesium sulfate 2000 mg in 50 mL IVPB premix  2,000 mg Intravenous PRN Aidan Kidd MD        ipratropium-albuterol (DUONEB) nebulizer solution 1 ampule  1 ampule Inhalation Q4H PRN Chano Klein MD Ilda            Labs:     Recent Labs     04/19/21  0932 04/20/21 0246 04/21/21  0312   WBC 7.8 12.8* 11.5*   RBC 4.91 4.23 3.90*   HGB 14.8 12.7 11.8*   HCT 45.4 38.9 36.4*   MCV 92.5 92.0 93.3   MCH 30.1 30.0 30.3   MCHC 32.6* 32.6* 32.4*    175 141     Recent Labs     04/19/21  0932 04/20/21  0246 04/21/21  0312    140 140   K 4.4 4.3 3.8   ANIONGAP 11 15 11    102 105   CO2 27 23 24   BUN 10 9 8   CREATININE 0.5 0.5 0.5   GLUCOSE 153* 133* 125*   CALCIUM 9.4 9.1 9.0     No results for input(s): MG, PHOS in the last 72 hours. Recent Labs     04/19/21 0932   AST 32   ALT 23   BILITOT 0.8   ALKPHOS 109*     ABGs:No results for input(s): PH, PO2, PCO2, HCO3, BE, O2SAT in the last 72 hours. Troponin T:   Recent Labs     04/19/21 0932   TROPONINI <0.01     INR:   Recent Labs     04/19/21 0932   INR 0.98     Lactic Acid: No results for input(s): LACTA in the last 72 hours.     Objective:   Vitals: /66   Pulse 57   Temp 97.5 °F (36.4 °C) (Temporal)   Resp 20   Ht 5' 5\" (1.651 m)   Wt 121 lb (54.9 kg)   SpO2 96%   BMI 20.14 kg/m²   24HR INTAKE/OUTPUT:      Intake/Output Summary (Last 24 hours) at 4/21/2021 1430  Last data filed at 4/21/2021 0830  Gross per 24 hour   Intake 1308 ml   Output 500 ml   Net 808 ml     General appearance: alert and cooperative with exam  HEENT: AT/NC  Lungs: no increased work of breathing, BLAE, no wheezing  Heart: RRR, no murmurs appreciated  Abdomen: BS+, soft, NT, ND  Extremities: no edema, pulses+  Neurologic: Alert, gross motor function intact  Skin: warm    Assessment and Plan:   Principal Problem:    Traumatic subarachnoid hemorrhage (HCC)  Active Problems:    AMS (altered mental status)    Witnessed seizure-like activity (HCC)    Carotid artery stenosis    Late onset Alzheimer's disease without behavioral disturbance (HCC)    LOC (loss of consciousness) ([facility]    Head injury    Palliative care patient    Punctate hemorrhage of left frontal [facility])  Resolved Problems:    * No resolved hospital problems. *    Subarachnoid/subdural hemorrhage: Neurosurgery on board - non-surgical.  Hold antiplatelet/anticoagulation. Skilled nurse facility placement. Keppra for 7 days total (started 4/19/2021). Repeat CTH ordered for AM.    Supportive management. PT/OT/SLP.     Advance Directive: DNR-CC    DVT prophylaxis: SCDs    Discharge planning: TBD - placement      Signed:  Moises Astorga MD 4/21/2021 2:30 PM  Rounding Hospitalist

## 2021-04-21 NOTE — PROGRESS NOTES
Attempted to feed pt her pureed dinner tray,pt refused. I was able to feed pt 3/4 container of raspberry sherbert. Bilateral soft restraints in place. Bed rails up x2, call light within reach.  Electronically signed by Rocio Osborn RN on 4/20/2021 at 7:03 PM

## 2021-04-21 NOTE — PROGRESS NOTES
chloride 10 mEq/100 mL IVPB (Peripheral Line), 10 mEq, Intravenous, PRN, Neto Hager MD    magnesium sulfate 2000 mg in 50 mL IVPB premix, 2,000 mg, Intravenous, PRN, Neto Hager MD    ipratropium-albuterol (DUONEB) nebulizer solution 1 ampule, 1 ampule, Inhalation, Q4H PRN, Neto Hager MD    Allergies:  Latex, Alcohol, Astelin [azelastine hcl], Ciprofloxacin hcl, Fluticasone propionate, Hydroquinone, Livalo [pitavastatin], Other, and Vitamin b complex [b-100]    Social History:   TOBACCO:   reports that she quit smoking about 31 years ago. She has a 12.50 pack-year smoking history. She has never used smokeless tobacco.  ETOH:   reports no history of alcohol use. Family History:       Problem Relation Age of Onset   [facility] Cancer Mother         Hodgkins Lymphoma, AML    Breast Cancer Mother     Heart Disease Other          PHYSICAL EXAM:  BP (!) 131/55   Pulse 55   Temp 96.9 °F (36.1 °C) (Temporal)   Resp 20   Ht 5' 5\" (1.651 m)   Wt 121 lb (54.9 kg)   SpO2 94%   BMI 20.14 kg/m²     Constitutional  well developed, well nourished. Eyes  conjunctiva normal.   Ear, nose, throat - No scars, masses, or lesions over external nose or ears, no atrophy of tongue  Neck-symmetric, no masses noted, no jugular vein distension  Respiration- chest wall appears symmetric, good expansion,   normal effort without use of accessory muscles  Musculoskeletal  no significant wasting of muscles noted, no bony deformities  Extremities-no clubbing, cyanosis or edema  Skin  warm, dry, and intact. No rash, erythema, or pallor.   Psychiatric  mood, affect, and behavior slow     Neurological exam  Awake, opens eyes to voice bradycardic fluent minimally verbal oriented self not following commands  Attention and concentration appear impaired  Recent and remote memory appears impaired  Speech with dysarthria       Cranial Nerve Exam     CN III, IV,VI-EOMI, No nystagmus, conjugate eye movements, no ptosis    CN VII-no facial assymetry       Motor Exam  Arms in restraints       Tremors- no tremors in hands or head noted     Gait  Not tested      Nursing/pcp notes, imaging,labs and vitals reviewed. PT,OT and/or speech notes reviewed    Lab Results   Component Value Date    WBC 11.5 (H) 04/21/2021    HGB 11.8 (L) 04/21/2021    HCT 36.4 (L) 04/21/2021    MCV 93.3 04/21/2021     04/21/2021     Lab Results   Component Value Date     04/21/2021    K 3.8 04/21/2021     04/21/2021    CO2 24 04/21/2021    BUN 8 04/21/2021    CREATININE 0.5 04/21/2021    GLUCOSE 125 (H) 04/21/2021    CALCIUM 9.0 04/21/2021    PROT 6.9 04/19/2021    LABALBU 4.4 04/19/2021    BILITOT 0.8 04/19/2021    ALKPHOS 109 (H) 04/19/2021    AST 32 04/19/2021    ALT 23 04/19/2021    LABGLOM >60 04/21/2021    GFRAA >59 04/21/2021     Lab Results   Component Value Date    INR 0.98 04/19/2021    PROTIME 12.9 04/19/2021       CT HEAD WO CONTRAST [[id number]]    Resulted: 04/20/21 0742    Updated: 04/20/21 0744    Narrative:     CT head 4/20/2021 4:02 AM   HISTORY: Follow-up subarachnoid hemorrhage   TECHNIQUE: Axial images of the head were obtained without IV contrast.   Coronal and sagittal reformatted images are reconstructed and   reviewed. COMPARISON: 4/19/2021. DLP: 833 mGy cm Automated exposure control was utilized to minimize   patient radiation dose. FINDINGS:   There is similar to slightly increasing subarachnoid hemorrhage along   the convexities of the left greater than right cerebral hemisphere. A   small subdural hemorrhage along the intercerebral falx (measured on   image 25) measures 3 mm. There is a similar small left frontal   contusion considered. . Chronic small vessel ischemic changes are   noted. An 8 mm calcification in the extra-axial space adjacent the   anterior left frontal lobe may be subarachnoid granulation   calcification, incidental calcified meningioma considered. Hyperostosis frontalis internus.  No skull fracture. Impression:     1. Similar to slightly increased subarachnoid hemorrhage along the   convexities of the left greater than right sulci with small subdural   hemorrhage along the intercerebral falx measuring 3 mm in greatest   width. No midline shifting. Questionable subcentimeter left frontal   contusion. Signed by Dr Argelia Cisse on 4/20/2021 7:42 AM   EEG [[id number]]    Resulted: 04/20/21 0731    Updated: 04/20/21 0733    Narrative:     Shey Johnson MD     4/20/2021  7:33 AM     Patient:   Mariza Barrow   MR#:    [id number]   Room:    9007441-18   Date of Birth:   1945   Date of Progress Note: 4/20/2021   Time of Note                           7:31 AM   Consulting Physician:   Shey Johnson M.D. Attending Physician: Estela Rahman MD         This is a multichannel digital EEG recording using the   international 10-20 placement system. Technical Summary:     Background EEG activity: The occipital dominant rhythm is 6-7 Hz. There is much low voltage 3-5 Hz activity seen in a generalized   distribution intermixed with low voltage 18-22 Hz activity. There   is moderate to high voltage moderate voltage 1.5-2 Hz activity   seen primarily in anterior regions. Photic Stimulation: No abnormal waveforms are noted with various   frequencies of flickering light. IMPRESSION:   This is an abnormal EEG due to the diffuse slowing of the   background. This finding is consistent with a diffuse disturbance   in cerebral function. No clear epileptiform activity was noted   during the recording period.        Dr Jac Marroquin Certified in Neurology   Board Certified in [facility] 61 Neurophysiology   Fellowship trained in [facility] 92 Time 25 minutes           RECORD REVIEW: Previous medical records, medications were reviewed at today's visit    IMPRESSION:   Intracerebral/subarahnoid hemorrhage post fall with possible seizure-no seizures noted in hospital   Patient with advance dementia-no significant change in neurological status     Currently awake to voice/stimulation orentied to self but not following commands        Non surgical hemorrhage  ICU monitoring with neuro checks  Repeat CT relatively stable  EEG slow no seizures noted  On Keppra 500 mg IV Q 12 for seizure prophylaxis   BP ok  DVT prophylaxis-SCDs  On NS IVF-speech to assess swallowing   PT/OT/Speech  Rehab consult      Supportive care    DW son          [facility]  [phone] CELL  Dr Greyson Martini

## 2021-04-22 ENCOUNTER — APPOINTMENT (OUTPATIENT)
Dept: GENERAL RADIOLOGY | Age: 76
DRG: 083 | End: 2021-04-22
Payer: MEDICARE

## 2021-04-22 ENCOUNTER — APPOINTMENT (OUTPATIENT)
Dept: CT IMAGING | Age: 76
DRG: 083 | End: 2021-04-22
Payer: MEDICARE

## 2021-04-22 LAB
ANION GAP SERPL CALCULATED.3IONS-SCNC: 9 MMOL/L (ref 7–19)
BACTERIA: ABNORMAL /HPF
BASOPHILS ABSOLUTE: 0.1 K/UL (ref 0–0.2)
BASOPHILS RELATIVE PERCENT: 0.6 % (ref 0–1)
BILIRUBIN URINE: NEGATIVE
BLOOD, URINE: ABNORMAL
BUN BLDV-MCNC: 8 MG/DL (ref 8–23)
CALCIUM SERPL-MCNC: 8.5 MG/DL (ref 8.8–10.2)
CHLORIDE BLD-SCNC: 104 MMOL/L (ref 98–111)
CLARITY: ABNORMAL
CO2: 24 MMOL/L (ref 22–29)
COLOR: ABNORMAL
CREAT SERPL-MCNC: 0.4 MG/DL (ref 0.5–0.9)
EOSINOPHILS ABSOLUTE: 0 K/UL (ref 0–0.6)
EOSINOPHILS RELATIVE PERCENT: 0.1 % (ref 0–5)
EPITHELIAL CELLS, UA: ABNORMAL /HPF
GFR AFRICAN AMERICAN: >59
GFR NON-AFRICAN AMERICAN: >60
GLUCOSE BLD-MCNC: 106 MG/DL (ref 74–109)
GLUCOSE URINE: NEGATIVE MG/DL
HCT VFR BLD CALC: 34.7 % (ref 37–47)
HEMOGLOBIN: 11.5 G/DL (ref 12–16)
IMMATURE GRANULOCYTES #: 0 K/UL
KETONES, URINE: ABNORMAL MG/DL
LEUKOCYTE ESTERASE, URINE: NEGATIVE
LYMPHOCYTES ABSOLUTE: 1.6 K/UL (ref 1.1–4.5)
LYMPHOCYTES RELATIVE PERCENT: 19.1 % (ref 20–40)
MAGNESIUM: 1.9 MG/DL (ref 1.6–2.4)
MCH RBC QN AUTO: 30.7 PG (ref 27–31)
MCHC RBC AUTO-ENTMCNC: 33.1 G/DL (ref 33–37)
MCV RBC AUTO: 92.8 FL (ref 81–99)
MONOCYTES ABSOLUTE: 0.8 K/UL (ref 0–0.9)
MONOCYTES RELATIVE PERCENT: 9.7 % (ref 0–10)
NEUTROPHILS ABSOLUTE: 5.7 K/UL (ref 1.5–7.5)
NEUTROPHILS RELATIVE PERCENT: 70.1 % (ref 50–65)
NITRITE, URINE: NEGATIVE
PDW BLD-RTO: 13.1 % (ref 11.5–14.5)
PH UA: 6.5 (ref 5–8)
PLATELET # BLD: 118 K/UL (ref 130–400)
PMV BLD AUTO: 11.9 FL (ref 9.4–12.3)
POTASSIUM REFLEX MAGNESIUM: 3.3 MMOL/L (ref 3.5–5)
PROTEIN UA: ABNORMAL MG/DL
RBC # BLD: 3.74 M/UL (ref 4.2–5.4)
RBC UA: ABNORMAL /HPF (ref 0–2)
SODIUM BLD-SCNC: 137 MMOL/L (ref 136–145)
SPECIFIC GRAVITY UA: 1.02 (ref 1–1.03)
UROBILINOGEN, URINE: 4 E.U./DL
WBC # BLD: 8.1 K/UL (ref 4.8–10.8)
WBC UA: ABNORMAL /HPF (ref 0–5)

## 2021-04-22 PROCEDURE — 81001 URINALYSIS AUTO W/SCOPE: CPT

## 2021-04-22 PROCEDURE — 51702 INSERT TEMP BLADDER CATH: CPT

## 2021-04-22 PROCEDURE — 6370000000 HC RX 637 (ALT 250 FOR IP): Performed by: PSYCHIATRY & NEUROLOGY

## 2021-04-22 PROCEDURE — 1210000000 HC MED SURG R&B

## 2021-04-22 PROCEDURE — 99233 SBSQ HOSP IP/OBS HIGH 50: CPT | Performed by: PSYCHIATRY & NEUROLOGY

## 2021-04-22 PROCEDURE — 97535 SELF CARE MNGMENT TRAINING: CPT

## 2021-04-22 PROCEDURE — 87086 URINE CULTURE/COLONY COUNT: CPT

## 2021-04-22 PROCEDURE — 36415 COLL VENOUS BLD VENIPUNCTURE: CPT

## 2021-04-22 PROCEDURE — 80048 BASIC METABOLIC PNL TOTAL CA: CPT

## 2021-04-22 PROCEDURE — 70450 CT HEAD/BRAIN W/O DYE: CPT

## 2021-04-22 PROCEDURE — 97530 THERAPEUTIC ACTIVITIES: CPT

## 2021-04-22 PROCEDURE — 51798 US URINE CAPACITY MEASURE: CPT

## 2021-04-22 PROCEDURE — 83735 ASSAY OF MAGNESIUM: CPT

## 2021-04-22 PROCEDURE — 87040 BLOOD CULTURE FOR BACTERIA: CPT

## 2021-04-22 PROCEDURE — 99231 SBSQ HOSP IP/OBS SF/LOW 25: CPT | Performed by: NEUROLOGICAL SURGERY

## 2021-04-22 PROCEDURE — 6370000000 HC RX 637 (ALT 250 FOR IP): Performed by: STUDENT IN AN ORGANIZED HEALTH CARE EDUCATION/TRAINING PROGRAM

## 2021-04-22 PROCEDURE — 71045 X-RAY EXAM CHEST 1 VIEW: CPT

## 2021-04-22 PROCEDURE — 97116 GAIT TRAINING THERAPY: CPT

## 2021-04-22 PROCEDURE — 85025 COMPLETE CBC W/AUTO DIFF WBC: CPT

## 2021-04-22 RX ORDER — LEVETIRACETAM 500 MG/1
500 TABLET ORAL 2 TIMES DAILY
Status: DISCONTINUED | OUTPATIENT
Start: 2021-04-22 | End: 2021-04-23 | Stop reason: HOSPADM

## 2021-04-22 RX ADMIN — LEVETIRACETAM 500 MG: 500 TABLET ORAL at 20:09

## 2021-04-22 RX ADMIN — LEVETIRACETAM 500 MG: 500 TABLET ORAL at 08:33

## 2021-04-22 RX ADMIN — QUETIAPINE FUMARATE 25 MG: 25 TABLET ORAL at 20:09

## 2021-04-22 RX ADMIN — ATORVASTATIN CALCIUM 40 MG: 40 TABLET, FILM COATED ORAL at 20:09

## 2021-04-22 RX ADMIN — POTASSIUM BICARBONATE 40 MEQ: 782 TABLET, EFFERVESCENT ORAL at 08:33

## 2021-04-22 RX ADMIN — CHOLESTYRAMINE 4 G: 4 POWDER, FOR SUSPENSION ORAL at 08:33

## 2021-04-22 NOTE — PROGRESS NOTES
Patient:   Isaiah Rachel  MR#:    [id number]   Room:    [facility]/756-76   YOB: 1945  Date of Progress Note: 4/22/2021  Time of Note                           8:10 AM  Consulting Physician:   Lady Peña M.D. Attending Physician:  Keely Trujillo MD     Chief complaint AMS possible seizure like activity post fall     S:This 67 y.o. female  with a past medical history significant for dementia is seen post fall with seizure like activity. The history is obtained from the chart and nurse. The staff at assisted living facility where they noted her to lost consciousness while walking and fell backwards and hit the back of her head. There was a report of seizure like activity. Following she was combative with a non focal exam except for asymmetric pupils. The ER reported she was at her baseline based upon previous records. Her CT of the brain in the ER had evidence of a left frontal hemorrhage with some subarachnoid blood left frontal region and high right parietal cortex with no mass effect of shift. No acute issues overnight.      REVIEW OF SYSTEMS:  Unable to obtain due to mental status    Past Medical History:      Diagnosis Date    ADD (attention deficit disorder)     Carotid atherosclerosis     Degeneration of cervical intervertebral disc     Mixed hyperlipidemia     Nontoxic multinodular goiter     OA (osteoarthritis)     Osteopenia     Palliative care patient 04/20/2021    Panic disorder without agoraphobia     Peripheral neuropathy     Pure hypercholesterolemia     Urinary incontinence     Vitamin D deficiency        Past Surgical History:      Procedure Laterality Date    COLONOSCOPY      HYSTERECTOMY, TOTAL ABDOMINAL      w/removal of both ovaries    JOINT REPLACEMENT      knee    TOTAL KNEE ARTHROPLASTY Right 2011    TOTAL KNEE ARTHROPLASTY Left 2012    VAGINA SURGERY      Abdomino-Vaginal Vesical Neck Suspension       Medications in Hospital:      Current Facility-Administered assymetry      Tremors- no tremors in hands or head noted        Nursing/pcp notes, imaging,labs and vitals reviewed. PT,OT and/or speech notes reviewed    Lab Results   Component Value Date    WBC 8.1 04/22/2021    HGB 11.5 (L) 04/22/2021    HCT 34.7 (L) 04/22/2021    MCV 92.8 04/22/2021     (L) 04/22/2021     Lab Results   Component Value Date     04/22/2021    K 3.3 (L) 04/22/2021     04/22/2021    CO2 24 04/22/2021    BUN 8 04/22/2021    CREATININE 0.4 (L) 04/22/2021    GLUCOSE 106 04/22/2021    CALCIUM 8.5 (L) 04/22/2021    PROT 6.9 04/19/2021    LABALBU 4.4 04/19/2021    BILITOT 0.8 04/19/2021    ALKPHOS 109 (H) 04/19/2021    AST 32 04/19/2021    ALT 23 04/19/2021    LABGLOM >60 04/22/2021    GFRAA >59 04/22/2021     Lab Results   Component Value Date    INR 0.98 04/19/2021    PROTIME 12.9 04/19/2021       CT HEAD WO CONTRAST [[id number]]    Resulted: 04/20/21 0742    Updated: 04/20/21 0744    Narrative:     CT head 4/20/2021 4:02 AM   HISTORY: Follow-up subarachnoid hemorrhage   TECHNIQUE: Axial images of the head were obtained without IV contrast.   Coronal and sagittal reformatted images are reconstructed and   reviewed. COMPARISON: 4/19/2021. DLP: 833 mGy cm Automated exposure control was utilized to minimize   patient radiation dose. FINDINGS:   There is similar to slightly increasing subarachnoid hemorrhage along   the convexities of the left greater than right cerebral hemisphere. A   small subdural hemorrhage along the intercerebral falx (measured on   image 25) measures 3 mm. There is a similar small left frontal   contusion considered. . Chronic small vessel ischemic changes are   noted. An 8 mm calcification in the extra-axial space adjacent the   anterior left frontal lobe may be subarachnoid granulation   calcification, incidental calcified meningioma considered. Hyperostosis frontalis internus. No skull fracture. Impression:     1.  Similar to slightly increased Currently awake to voice/stimulation orentied to self but not following commands        Non surgical hemorrhage  ICU monitoring with neuro checks  Repeat CT 4/22 stable  EEG slow no seizures noted  Change Keppra to oral for seizure prophylaxis-would continue for seizure at the onset of injury-Dementia increases risk for seizures as well  BP ok  DVT prophylaxis-SCDs  On NS IVF-taking PO  PT/OT/Speech    No appropriate candidate for rehab too low functioning/inabiltiy to follow commands     Supportive care    DW son    [facility] to DC to NH from neuro standpoint   F/u PRN      CALL WITH ANY QUESTIONS  [phone] CELL  Dr Logan Sanabria

## 2021-04-22 NOTE — PROGRESS NOTES
Pt is asleep in bed. Does not awaken to my voice. No family present. Awaiting pre cert for adm to [facility].   Electronically signed by Yesica Welsh RN on 4/22/2021 at 3:15 PM

## 2021-04-22 NOTE — PROGRESS NOTES
Physical Therapy  Name: Telly Krishnamurthy  MRN:  [id number]  Date of service:  4/22/2021 04/22/21 1141   Restrictions/Precautions   Restrictions/Precautions Fall Risk;Seizure   Required Braces or Orthoses? No   General   Chart Reviewed Yes   Additional Pertinent Hx Hx of Alzheimer's per chart   Subjective   Subjective Pt in bed, agreeable to get up to the chair. RN ok'gloria. Pain Screening   Patient Currently in Pain Denies   Bed Mobility   Supine to Sit Moderate assistance   Scooting Moderate assistance  (to EOB in sitting)   Comment Able to sit EOB unsupported by therapist once in full sitting   Transfers   Sit to Stand Minimal Assistance;2 Person Assistance   Stand to sit Minimal Assistance;2 Person Assistance   Bed to Chair Minimal assistance;2 Person Assistance   Ambulation   Ambulation? Yes   WB Status WBAT   Ambulation 1   Surface level tile   Device Rolling Walker   Assistance Minimal assistance;2 Person assistance   Quality of Gait Narrow PARISA, Bilateral hip IR, almost scissoring at times   Gait Deviations Slow Drea;Decreased step height;Decreased step length   Distance 5'   Comments bed to chair   Short term goals   Time Frame for Short term goals 2 weeks   Short term goal 1 Supine<>sit, sup   Short term goal 2 STS, sup   Short term goal 3 Amb 150ft w RW, CGA   Conditions Requiring Skilled Therapeutic Intervention   Body structures, Functions, Activity limitations Decreased functional mobility ; Decreased safe awareness;Decreased balance;Decreased strength;Decreased cognition;Decreased ROM   Assessment Pt able to stand and take steps to recliner, becomes confused and requires frequent directional cues to make it to recliner. Pt becomes agitated easily and jerks at rwx when tactile cues given for direction. Pt up in recliner, positioned for comfort with all needs in reach.    Activity Tolerance   Activity Tolerance Patient Tolerated treatment well;Patient limited by cognitive status   Safety Devices   Type of devices Call light within reach; Chair alarm in place;Gait belt;Left in chair;Nurse notified         Electronically signed by Kamryn Salgado PTA on 4/22/2021 at 11:44 AM

## 2021-04-22 NOTE — PROGRESS NOTES
Physical Therapy  Name: Isaiah Rachel  MRN:  [id number]  Date of service:  4/22/2021 04/22/21 1415   Restrictions/Precautions   Restrictions/Precautions Fall Risk;Seizure   Required Braces or Orthoses? No   General   Chart Reviewed Yes   Additional Pertinent Hx Hx of Alzheimer's per chart   Subjective   Subjective Pt ready for back to bed. Pain Screening   Patient Currently in Pain No   Bed Mobility   Sit to Supine Minimal assistance  (BLE management)   Scooting Maximal assistance;2 Person assistance  (scooting up in bed)   Transfers   Sit to Stand Minimal Assistance;2 Person Assistance   Stand to sit Minimal Assistance;2 Person Assistance   Bed to Chair Minimal assistance;2 Person Assistance   Ambulation   Ambulation? Yes   WB Status WBAT   Ambulation 1   Surface level tile   Device Rolling Walker   Assistance Minimal assistance;2 Person assistance   Quality of Gait Narrow PARISA, Bilateral hip IR, almost scissoring at times   Gait Deviations Slow Drea;Decreased step height;Decreased step length   Distance 5'   Comments chair to bed   Short term goals   Time Frame for Short term goals 2 weeks   Short term goal 1 Supine<>sit, sup   Short term goal 2 STS, sup   Short term goal 3 Amb 150ft w RW, CGA   Conditions Requiring Skilled Therapeutic Intervention   Body structures, Functions, Activity limitations Decreased functional mobility ; Decreased safe awareness;Decreased balance;Decreased strength;Decreased cognition;Decreased ROM   Assessment Pt cont to require directional cues to take steps from recliner back to bed. Pt returned to supine and positioned for comfort with all needs in reach.    Activity Tolerance   Activity Tolerance Patient Tolerated treatment well;Patient limited by cognitive status   Safety Devices   Type of devices Bed alarm in place;Call light within reach;Gait belt;Left in bed         Electronically signed by Briseida Fletcher PTA on 4/22/2021 at 2:20 PM

## 2021-04-22 NOTE — PROGRESS NOTES
Bilateral soft wrist restraints removed at this time. Pt resting quietly, not pulling at lines. Bed rails up x2, call light within reach,bed alarm engaged.

## 2021-04-22 NOTE — PROGRESS NOTES
[facility]        Hospitalist Progress Note  4/22/2021 11:32 AM  Subjective:   Admit Date: 4/19/2021  PCP: Javier Paz MD    Chief Complaint: Fall    Subjective: Patient seen and examined at bedside. Sitting in bedside chair. States she feels well. No current complaints. Cumulative Hospital History: 58-year-old female with advanced dementia presented to the ED 4/19 following a fall at [facility] with reported witnessed seizure-like activity. Staff at [facility] noted patient briefly lost consciousness while ambulating and fell backwards and hit the back of her head. Admitted with traumatic subarachnoid hemorrhage after CT head showed left frontal hemorrhage, but no mass-effect or shift. Patient initially monitored in ICU and did require soft restraints for safety. Developed sinus bradycardia overnight however heart rate fluctuating 40s60s. Repeat CT head a.m. 4/20 shows similar to slightly increased subarachnoid hemorrhage, and new small subdural hemorrhage, but no midline shift. Hemorrhage nonsurgical.  Neurosurgery and neurology following. Patient remained stable and transferred to the floor. ROS: 14 point review of systems is negative except as specifically addressed above. DIET DYSPHAGIA MINCED AND MOIST;     Intake/Output Summary (Last 24 hours) at 4/22/2021 1132  Last data filed at 4/22/2021 0945  Gross per 24 hour   Intake 2054 ml   Output 1550 ml   Net 504 ml     Medications:   sodium chloride      sodium chloride 75 mL/hr at 04/20/21 0600     Current Facility-Administered Medications   Medication Dose Route Frequency Provider Last Rate Last Admin    levETIRAcetam (KEPPRA) tablet 500 mg  500 mg Oral BID Reginaldo Amezcua MD   500 mg at 04/22/21 4369    enalaprilat (VASOTEC) injection 1.25 mg  1.25 mg Intravenous Q6H PRN Jazmyn German MD        atorvastatin (LIPITOR) tablet 40 mg  40 mg Oral Nightly Jazmyn German MD        cholestyramine light packet 4 g  4 g Oral Daily True Ramp Inhalation Q4H PRN Liza Diop MD            Labs:     Recent Labs     04/20/21  0246 04/21/21  0312 04/22/21  0422   WBC 12.8* 11.5* 8.1   RBC 4.23 3.90* 3.74*   HGB 12.7 11.8* 11.5*   HCT 38.9 36.4* 34.7*   MCV 92.0 93.3 92.8   MCH 30.0 30.3 30.7   MCHC 32.6* 32.4* 33.1    141 118*     Recent Labs     04/20/21  0246 04/21/21  0312 04/22/21  0422    140 137   K 4.3 3.8 3.3*   ANIONGAP 15 11 9    105 104   CO2 23 24 24   BUN 9 8 8   CREATININE 0.5 0.5 0.4*   GLUCOSE 133* 125* 106   CALCIUM 9.1 9.0 8.5*     Recent Labs     04/22/21  0422   MG 1.9     No results for input(s): AST, ALT, ALB, BILITOT, ALKPHOS, ALB in the last 72 hours. ABGs:No results for input(s): PH, PO2, PCO2, HCO3, BE, O2SAT in the last 72 hours. Troponin T:   No results for input(s): TROPONINI in the last 72 hours. INR:   No results for input(s): INR in the last 72 hours. Lactic Acid: No results for input(s): LACTA in the last 72 hours.     Objective:   Vitals: BP (!) 118/57   Pulse 54   Temp 100.6 °F (38.1 °C)   Resp 16   Ht 5' 5\" (1.651 m)   Wt 121 lb (54.9 kg)   SpO2 96%   BMI 20.14 kg/m²   24HR INTAKE/OUTPUT:      Intake/Output Summary (Last 24 hours) at 4/22/2021 1132  Last data filed at 4/22/2021 0945  Gross per 24 hour   Intake 2054 ml   Output 1550 ml   Net 504 ml     General appearance: alert and cooperative with exam  HEENT: AT/NC  Lungs: no increased work of breathing, BLAE, no wheezing  Heart: RRR, no murmurs appreciated  Abdomen: BS+, soft, NT, ND  Extremities: no edema, pulses+  Neurologic: Alert, gross motor function intact  Skin: warm    Assessment and Plan:   Principal Problem:    Traumatic subarachnoid hemorrhage (HCC)  Active Problems:    AMS (altered mental status)    Witnessed seizure-like activity ([address]    Carotid artery stenosis    Late onset Alzheimer's disease without behavioral disturbance (HCC)    LOC (loss of consciousness) [address] [address]    Head injury    Palliative care patient    Punctate hemorrhage of left frontal lobe (HCC)  Resolved Problems:    * No resolved hospital problems. *    Subarachnoid/subdural hemorrhage: Neurosurgery on board - non-surgical.  Hold antiplatelet/anticoagulation. Skilled nurse facility placement. Keppra. Fever: No leukocytosis. UA/CXR/Cultures. Monitor off antibiotics for now. Supportive management. PT/OT/SLP.     Advance Directive: DNR-CC    DVT prophylaxis: SCDs    Discharge planning: TBD      Signed:  Sid Sandifer, MD 4/22/2021 11:32 AM  Rounding Hospitalist

## 2021-04-22 NOTE — PROGRESS NOTES
[facility]  Progress Note    LAST 24 HOURS: No major issues overnight. Awake and does follow some commands. CEDILLO well. She cannot state year or location. She denies any headaches. CHIEF COMPLAINT:  Fall at assisted living facility, head truma    HISTORY OF PRESENT ILLNESS:      The patient is a 67 y.o. female who presents to the ED following a fall today at [facility] assisted living facility. She has a history of dementia and is not the best historian. She is unable to recall the event or any of her medical history. She denies any HAs. She sustained posterior occipital head trauma which required staples in her scalp. CT head revealed a left frontal and right convexity traumatic subarachnoid hemorrhage for which I was asked to evaluate. The patient does not take anticoagulation medication.        Past Medical History:   Diagnosis Date    ADD (attention deficit disorder)     Carotid atherosclerosis     Degeneration of cervical intervertebral disc     Mixed hyperlipidemia     Nontoxic multinodular goiter     OA (osteoarthritis)     Osteopenia     Palliative care patient 04/20/2021    Panic disorder without agoraphobia     Peripheral neuropathy     Pure hypercholesterolemia     Urinary incontinence     Vitamin D deficiency        Past Surgical History:   Procedure Laterality Date    COLONOSCOPY      HYSTERECTOMY, TOTAL ABDOMINAL      w/removal of both ovaries    JOINT REPLACEMENT      knee    TOTAL KNEE ARTHROPLASTY Right 2011    TOTAL KNEE ARTHROPLASTY Left 2012    VAGINA SURGERY      Abdomino-Vaginal Vesical Neck Suspension        Medications    Current Facility-Administered Medications:     levETIRAcetam (KEPPRA) tablet 500 mg, 500 mg, Oral, BID, Jeramie Hughes MD    enalaprilat (VASOTEC) injection 1.25 mg, 1.25 mg, Intravenous, Q6H PRN, Stacey Espinosa MD    atorvastatin (LIPITOR) tablet 40 mg, 40 mg, Oral, Nightly, Stacey Espinosa MD    cholestyramine light packet 4 g, 4 g, Oral, Daily, Aidan Kidd MD    [Held by provider] dextromethorphan-quiNIDine (NUEDEXTA) 20-10 MG per capsule 1 capsule, 1 capsule, Oral, Daily, Aidan Kidd MD  [facility] by provider] meclizine (ANTIVERT) tablet 25 mg, 25 mg, Oral, TID PRN, Aidan Kidd MD    QUEtiapine (SEROQUEL) tablet 25 mg, 25 mg, Oral, Nightly, Aidan Kidd MD, 25 mg at 04/21/21 2138    vitamin D (ERGOCALCIFEROL) capsule 50,000 Units, 50,000 Units, Oral, Weekly, Aidan Kidd MD    LORazepam (ATIVAN) injection 2 mg, 2 mg, Intravenous, Q4H PRN, Aidan Kidd MD    sodium chloride flush 0.9 % injection 5-40 mL, 5-40 mL, Intravenous, 2 times per day, Aidan Kidd MD, 10 mL at 04/20/21 0840    sodium chloride flush 0.9 % injection 5-40 mL, 5-40 mL, Intravenous, PRN, Aidan Kidd MD    0.9 % sodium chloride infusion, 25 mL, Intravenous, PRN, Aidan Kidd MD    promethazine (PHENERGAN) tablet 12.5 mg, 12.5 mg, Oral, Q6H PRN **OR** ondansetron (ZOFRAN) injection 4 mg, 4 mg, Intravenous, Q6H PRN, Aidan Kidd MD, 4 mg at 04/19/21 1120    acetaminophen (TYLENOL) tablet 650 mg, 650 mg, Oral, Q6H PRN **OR** acetaminophen (TYLENOL) suppository 650 mg, 650 mg, Rectal, Q6H PRN, Aidan Kidd MD    0.9 % sodium chloride infusion, , Intravenous, Continuous, Aidan Kidd MD, Last Rate: 75 mL/hr at 04/20/21 0600, Rate Verify at 04/20/21 0600    potassium chloride (KLOR-CON M) extended release tablet 40 mEq, 40 mEq, Oral, PRN **OR** potassium bicarb-citric acid (EFFER-K) effervescent tablet 40 mEq, 40 mEq, Oral, PRN **OR** potassium chloride 10 mEq/100 mL IVPB (Peripheral Line), 10 mEq, Intravenous, PRN, Aidan Kidd MD    magnesium sulfate 2000 mg in 50 mL IVPB premix, 2,000 mg, Intravenous, PRN, Aidan Kidd MD    ipratropium-albuterol (DUONEB) nebulizer solution 1 ampule, 1 ampule, Inhalation, Q4H PRN, Aidan Kidd MD  Latex, Alcohol, Astelin [azelastine hcl], Ciprofloxacin hcl, Fluticasone propionate, Hydroquinone, Livalo [pitavastatin], Other, and Vitamin b complex [b-100]    Social History  Social History     Tobacco Use   Smoking Status Former Smoker    Packs/day: 2.50    Years: 5.00    Pack years: 12.50    Quit date: 200    Years since quittin.3   Smokeless Tobacco Never Used     Social History     Substance and Sexual Activity   Alcohol Use Never    Frequency: Never         Family History   Problem Relation Age of Onset   Murrel Neither Cancer Mother         Hodgkins Lymphoma, AML    Breast Cancer Mother     Heart Disease Other          REVIEW OF SYSTEMS:  Constitutional: No fevers No chills  Neck:No stiffness  Respiratory: No shortness of breath  Cardiovascular: No chest pain No palpitations  Gastrointestinal: No abdominal pain    Genitourinary: No Dysuria  Neurological: No headache, + confusion    PHYSICAL EXAM:  Vitals:    21 0255   BP: (!) 121/55   Pulse: (!) 43   Resp: 16   Temp: 97.7 °F (36.5 °C)   SpO2: 91%       Constitutional: The patient appears well-developed and well-nourished. Eyes  conjunctiva normal.  Conjugate Gaze  Ear, nose, throat - No scars, masses, or lesions over external nose or ears, no atrophy of tongue  Neck-symmetric, no masses noted, no jugular vein distension  Respiration- chest wall appears symmetric, good expansion, normal effort without use of accessory muscles  Musculoskeletal  no significant wasting of muscles noted, no bony deformities  Extremities-no clubbing, cyanosis or edema  Skin  warm, dry, and intact. No rash, erythema, or pallor. Neurologic Examination  Awake, Alert, unable to state year, location or president  Normal speech pattern, following commands  Motor 5/5 all extremities, no clear drift  PERRL, EOMI  No deficits to light touch       DATA:  Nursing/pcp notes, imaging,labs and vitals reviewed.      PT,OT and/or speech notes reviewed    Lab Results   Component Value Date    WBC 8.1 2021    HGB well as subarachnoid hemorrhage overlying the left frontal cortex. Some contusional hemorrhage of the left frontal cortex is also   suspected. Also demonstrated is subarachnoid hemorrhage high within   the right parietal vertex. There is no associated mass effect or shift   of the midline. 2. Atrophy with small vessel ischemic disease. 3. I spoke with Dr. Lexii Vasquez in the ER at 9:30 AM concerning the   findings of hemorrhage within the right parietal vertex and left   frontal lobe. Signed by Dr Meri Butler on 4/19/2021 9:34 AM         CT head 4/22/2021 4:38 AM   HISTORY: Trauma subarachnoid hemorrhage   TECHNIQUE: Axial images of the head were obtained without IV contrast.   Coronal and sagittal reformatted images are reconstructed and   reviewed. COMPARISON: 4/20/2021. DLP: 760 mGy cm Automated exposure control was utilized to minimize   patient radiation dose. FINDINGS:    There is a stable appearance to the subarachnoid hemorrhage along the   left greater than right convexity is. A small left subdural hematoma   identified adjacent the left cerebellar tentorium and along the left   occipital lobe measuring 3 mm. Small similar subdural hemorrhage along   the posterior intercerebral falx measuring 4 mm. Only minimal   left-to-right midline shifting measuring 4 mm. Ventricles are similar   in size and configuration. No acute signs of ischemia. Stable calcifications adjacent the frontal lobes. Hyperostosis   frontalis internus. No depressed skull fracture. No air-fluid levels   in the paranasal sinuses. Mastoid air cells are well-aerated.       Impression   1. Similar subarachnoid hemorrhage along the convexities of the left   greater than right cerebral hemispheres. Small left subdural hematoma   with subdural hemorrhage again seen along the posterior intercerebral   falx. Minimal left-to-right midline shifting measuring 4 mm. Ventricles similar in size and configuration.    Signed by Dr Adelia Rachel Andrés Aburto on 4/22/2021 8:00 AM         IMPRESSION  67year old female, hx of dementia, s/p fall at her assisted living facility with traumatic subarachnoid hemorrhage noted on CT    RECOMMENDATIONS:    Repeat CT head today is stable, remains non-surgical  PT/OT to assess   Maintain SBP   Keppra 500 BID x 7 days (started on 4/19/2021)  Case management referral for disposition planning, cleared for discharge from a neurosurgical standpoint  Avoid any anticoagulation/antiplatelet medications for now  Follow up with our team in 1-2 weeks      Thuan Wills DO

## 2021-04-22 NOTE — PROGRESS NOTES
Occupational Therapy     04/22/21 1513   Restrictions/Precautions   Restrictions/Precautions Fall Risk;Seizure   Required Braces or Orthoses? No   Vision   Vision [facility]   Hearing   Hearing [facility]   General   Chart Reviewed Yes   Patient assessed for rehabilitation services? Yes   Additional Pertinent Hx Advanced dementia; (B) TKA; neuropathy; incontinence; goiter. Response to previous treatment Patient with no complaints from previous session   Family / Caregiver Present No   Diagnosis subarachnoid hemorrahage; posterior occipital trauma (both due to fall). Subjective   Subjective Pt falling asleep in reclinning chair and wants to get back in bed upon arrival for therapy. Pain Assessment   Patient Currently in Pain No   ADL   Feeding Maximum assistance   Grooming Maximum assistance   Toileting Dependent/Total   Additional Comments Advanced dementia is the most observed limiting factor. Pt unaware need to perform these actions and cannot follow commands. Balance   Sitting Balance Contact guard assistance   Standing Balance Minimal assistance   Bed mobility   Rolling to Left Moderate assistance   Rolling to Right Moderate assistance   Sit to Supine Maximum assistance   Scooting Maximal assistance   Transfers   Stand Step Transfers Minimal assistance  (with increased verbal and tactile cues)   Sit to stand Moderate assistance   Stand to sit Moderate assistance   Transfer Comments Pt requires increased cues for sequencing and safety throughout all aspects of mobility    Short term goals   Time Frame for Short term goals 1-2 weeks   Short term goal 1 Complete pre-ADL purposeful movements/activities with min A for execution. Short term goal 2 Roll R and L with min A to assist with bedlevel ADLs. Short term goal 3 Complete transfers with CGA and PRN DME. Long term goals   Long term goal 1 Upgrade as tolerated.    Assessment   Performance deficits / Impairments Decreased cognition;Decreased safe awareness;Decreased strength;Decreased ADL status; Decreased functional mobility ; Decreased endurance;Decreased balance;Decreased coordination;Decreased posture   Assessment Pt requires increased cues for sequencing and attention to tasks throughout tx. Prognosis Poor   Decision Making Low Complexity   REQUIRES OT FOLLOW UP Yes   Treatment Initiated  Tx focused on getting back to bed, bed mobility/ positioning and self feeding. Discharge Recommendations Patient would benefit from continued therapy after discharge   Activity Tolerance   Activity Tolerance Treatment limited secondary to decreased cognition   Safety Devices   Safety Devices in place Yes   Type of devices Bed alarm in place;Call light within reach; Left in bed;Nurse notified   Plan   Times per week 1-3   Current Treatment Recommendations Cognitive/Perceptual Training;Cognitive Reorientation; Functional Mobility Training;Patient/Caregiver Education & Training;Balance Training; Safety Education & Training;Positioning;Strengthening;Self-Care / ADL;Equipment Evaluation, Education, & procurement;Home Management Training; Endurance Training   Electronically signed by VERO Suarez on 4/22/2021 at 3:22 PM

## 2021-04-22 NOTE — PROGRESS NOTES
Wrist restraints remain off at this time. Pt resting with eyes closed. Bed alarm on,bed rails up x2.

## 2021-04-23 VITALS
SYSTOLIC BLOOD PRESSURE: 147 MMHG | OXYGEN SATURATION: 94 % | DIASTOLIC BLOOD PRESSURE: 61 MMHG | HEIGHT: 65 IN | BODY MASS INDEX: 20.16 KG/M2 | WEIGHT: 121 LBS | RESPIRATION RATE: 18 BRPM | HEART RATE: 63 BPM | TEMPERATURE: 98.9 F

## 2021-04-23 LAB
ANION GAP SERPL CALCULATED.3IONS-SCNC: 13 MMOL/L (ref 7–19)
BASOPHILS ABSOLUTE: 0.1 K/UL (ref 0–0.2)
BASOPHILS RELATIVE PERCENT: 0.7 % (ref 0–1)
BUN BLDV-MCNC: 6 MG/DL (ref 8–23)
CALCIUM SERPL-MCNC: 8.2 MG/DL (ref 8.8–10.2)
CHLORIDE BLD-SCNC: 102 MMOL/L (ref 98–111)
CO2: 24 MMOL/L (ref 22–29)
CREAT SERPL-MCNC: 0.5 MG/DL (ref 0.5–0.9)
EOSINOPHILS ABSOLUTE: 0 K/UL (ref 0–0.6)
EOSINOPHILS RELATIVE PERCENT: 0.1 % (ref 0–5)
GFR AFRICAN AMERICAN: >59
GFR NON-AFRICAN AMERICAN: >60
GLUCOSE BLD-MCNC: 100 MG/DL (ref 74–109)
HCT VFR BLD CALC: 33.7 % (ref 37–47)
HEMOGLOBIN: 11.2 G/DL (ref 12–16)
IMMATURE GRANULOCYTES #: 0 K/UL
LYMPHOCYTES ABSOLUTE: 1.6 K/UL (ref 1.1–4.5)
LYMPHOCYTES RELATIVE PERCENT: 18.2 % (ref 20–40)
MAGNESIUM: 2 MG/DL (ref 1.6–2.4)
MCH RBC QN AUTO: 30.4 PG (ref 27–31)
MCHC RBC AUTO-ENTMCNC: 33.2 G/DL (ref 33–37)
MCV RBC AUTO: 91.6 FL (ref 81–99)
MONOCYTES ABSOLUTE: 0.8 K/UL (ref 0–0.9)
MONOCYTES RELATIVE PERCENT: 8.6 % (ref 0–10)
NEUTROPHILS ABSOLUTE: 6.3 K/UL (ref 1.5–7.5)
NEUTROPHILS RELATIVE PERCENT: 72.2 % (ref 50–65)
PDW BLD-RTO: 13 % (ref 11.5–14.5)
PLATELET # BLD: 116 K/UL (ref 130–400)
PMV BLD AUTO: 12.2 FL (ref 9.4–12.3)
POTASSIUM REFLEX MAGNESIUM: 3.1 MMOL/L (ref 3.5–5)
RBC # BLD: 3.68 M/UL (ref 4.2–5.4)
SARS-COV-2, NAAT: NOT DETECTED
SODIUM BLD-SCNC: 139 MMOL/L (ref 136–145)
WBC # BLD: 8.7 K/UL (ref 4.8–10.8)

## 2021-04-23 PROCEDURE — 6360000002 HC RX W HCPCS: Performed by: INTERNAL MEDICINE

## 2021-04-23 PROCEDURE — 36415 COLL VENOUS BLD VENIPUNCTURE: CPT

## 2021-04-23 PROCEDURE — 97110 THERAPEUTIC EXERCISES: CPT

## 2021-04-23 PROCEDURE — 2580000003 HC RX 258: Performed by: INTERNAL MEDICINE

## 2021-04-23 PROCEDURE — 85025 COMPLETE CBC W/AUTO DIFF WBC: CPT

## 2021-04-23 PROCEDURE — 80048 BASIC METABOLIC PNL TOTAL CA: CPT

## 2021-04-23 PROCEDURE — 99232 SBSQ HOSP IP/OBS MODERATE 35: CPT | Performed by: PSYCHIATRY & NEUROLOGY

## 2021-04-23 PROCEDURE — 97116 GAIT TRAINING THERAPY: CPT

## 2021-04-23 PROCEDURE — 97535 SELF CARE MNGMENT TRAINING: CPT

## 2021-04-23 PROCEDURE — 2580000003 HC RX 258: Performed by: STUDENT IN AN ORGANIZED HEALTH CARE EDUCATION/TRAINING PROGRAM

## 2021-04-23 PROCEDURE — 83735 ASSAY OF MAGNESIUM: CPT

## 2021-04-23 PROCEDURE — 6370000000 HC RX 637 (ALT 250 FOR IP): Performed by: STUDENT IN AN ORGANIZED HEALTH CARE EDUCATION/TRAINING PROGRAM

## 2021-04-23 PROCEDURE — 99231 SBSQ HOSP IP/OBS SF/LOW 25: CPT | Performed by: NEUROLOGICAL SURGERY

## 2021-04-23 PROCEDURE — 6370000000 HC RX 637 (ALT 250 FOR IP): Performed by: PSYCHIATRY & NEUROLOGY

## 2021-04-23 PROCEDURE — 87635 SARS-COV-2 COVID-19 AMP PRB: CPT

## 2021-04-23 PROCEDURE — 97530 THERAPEUTIC ACTIVITIES: CPT

## 2021-04-23 RX ORDER — CEFDINIR 300 MG/1
300 CAPSULE ORAL 2 TIMES DAILY
Qty: 14 CAPSULE | Refills: 0 | Status: SHIPPED | OUTPATIENT
Start: 2021-04-23 | End: 2021-04-30

## 2021-04-23 RX ORDER — LEVETIRACETAM 500 MG/1
500 TABLET ORAL 2 TIMES DAILY
Qty: 8 TABLET | Refills: 0 | Status: SHIPPED | OUTPATIENT
Start: 2021-04-23 | End: 2021-04-27

## 2021-04-23 RX ADMIN — LEVETIRACETAM 500 MG: 500 TABLET ORAL at 10:00

## 2021-04-23 RX ADMIN — SODIUM CHLORIDE, PRESERVATIVE FREE 10 ML: 5 INJECTION INTRAVENOUS at 10:00

## 2021-04-23 RX ADMIN — CEFTRIAXONE 1000 MG: 1 INJECTION, POWDER, FOR SOLUTION INTRAMUSCULAR; INTRAVENOUS at 10:00

## 2021-04-23 RX ADMIN — POTASSIUM BICARBONATE 40 MEQ: 782 TABLET, EFFERVESCENT ORAL at 10:00

## 2021-04-23 NOTE — PROGRESS NOTES
Physical Therapy  Fransico Fabry  [id number]     04/23/21 0946   Subjective   Subjective Agrees to work with therapy   Pain Screening   Patient Currently in Pain Denies   Bed Mobility   Supine to Sit Moderate assistance   Sit to Supine Moderate assistance   Transfers   Sit to Stand Moderate Assistance;Minimal Assistance   Stand to sit Minimal Assistance   Ambulation   Ambulation? Yes   WB Status WBAT   Ambulation 1   Surface level tile   Device Rolling Walker   Other Apparatus   (figueroa)   Assistance Moderate assistance;Minimal assistance   Quality of Gait Narrow PARISA, Bilateral hip IR, almost scissoring at times   Distance 5'   Exercises   Comments YESENIA LEMA LE ex's in sitting   Other Activities   Comment Patient did well with therapy. Patient requested to return to bed to look at the paper. Patient positioned for comfort with all needs in reach. Short term goals   Time Frame for Short term goals 2 weeks   Short term goal 1 Supine<>sit, sup   Short term goal 2 STS, sup   Short term goal 3 Amb 150ft w RW, CGA   Activity Tolerance   Activity Tolerance Patient Tolerated treatment well;Patient limited by cognitive status   Safety Devices   Type of devices Bed alarm in place;Call light within reach; Left in bed   Electronically signed by Sarthak Roth PTA on 4/23/2021 at 9:48 AM

## 2021-04-23 NOTE — DISCHARGE SUMMARY
is a similar small left frontal contusion considered. . Chronic small vessel ischemic changes are noted. An 8 mm calcification in the extra-axial space adjacent the anterior left frontal lobe may be subarachnoid granulation calcification, incidental calcified meningioma considered. Hyperostosis frontalis internus. No skull fracture. 1. Similar to slightly increased subarachnoid hemorrhage along the convexities of the left greater than right sulci with small subdural hemorrhage along the intercerebral falx measuring 3 mm in greatest width. No midline shifting. Questionable subcentimeter left frontal contusion. Signed by Dr Mary Pond on 4/20/2021 7:42 AM    Ct Head Wo Contrast    Result Date: 4/19/2021  EXAMINATION: CT head without contrast 4/19/2021 HISTORY: Posterior head injury. Question seizure. FINDINGS: Multiple contiguous axial views are obtained from the skull base to the vertex per protocol without intravenous contrast enhancement with reformatted images obtained in the sagittal and coronal projections from the original data set. There is atrophy of the brain with prominence of the subarachnoid spaces and ventricular enlargement. Small vessel ischemic disease is noted involving the periventricular and higher white matter tracts. On images through the right parietal vertex there is evidence of subarachnoid hemorrhage which is demonstrated on images 24 through 26 of the axial sequence. There is also left frontal hemorrhage including what I suspect to be predominantly subarachnoid hemorrhage overlying the left frontal cortex as well as a small amount of blood within the anterior interhemispheric fissure within the subdural space measuring 5 mm in thickness. There is no associated mass effect. There is no associated calvarial injury. There is no mass effect or shift of the midline. No evidence of acute infarct.     1.. Left frontal hemorrhage including a small amount of blood within the anterior interhemispheric fissure within the subdural space as well as subarachnoid hemorrhage overlying the left frontal cortex. Some contusional hemorrhage of the left frontal cortex is also suspected. Also demonstrated is subarachnoid hemorrhage high within the right parietal vertex. There is no associated mass effect or shift of the midline. 2. Atrophy with small vessel ischemic disease. 3. I spoke with Dr. Tete Rockwell in the ER at 9:30 AM concerning the findings of hemorrhage within the right parietal vertex and left frontal lobe. Signed by Dr Tash Monsivais on 4/19/2021 9:34 AM    Ct Cervical Spine Wo Contrast    Result Date: 4/19/2021  CT CERVICAL SPINE WO CONTRAST 4/19/2021 8:23 AM HISTORY: Fall with head injury. COMPARISON: None DOSE LENGTH PRODUCT: 361 mGy cm. Automated exposure control was utilized to diminish patient radiation dose. TECHNIQUE: Serial helical tomographic images of the cervical spine were obtained without the use of intravenous contrast. Additionally, sagittal and coronal reformatted images were also provided for review. FINDINGS: There is no evidence of fracture. There is reversal of the normal cervical lordosis with 3 mm of anterolisthesis of C3 on C4-likely arthritic in nature. . The atlantooccipital and atlantoaxial articulations are intact. Likewise, the dens is unremarkable. Vertebral body height and alignment are well maintained. Degenerative disc disease is noted at C4-C5, C5-C6 and C6-C7 with narrowing of the disc space height and disc osteophyte complex. . Bony neural foraminal encroachment is noted at multiple levels related to facet and uncovertebral hypertrophy. There is also disc osteophyte complex at C4-C5, C5-C6 and C6-C7 with mild ventral indentation of the thecal sac but without evidence of significant central stenosis. . There is no evidence of facet lock or perch. The posterior elements are intact. The visualized soft tissues are unremarkable.   The visualized thorax demonstrates no acute abnormality. 1. Reversal of normal cervical lordosis. There is anterolisthesis of C3 on C4 felt to be arthritic in nature. 2. No evidence of acute fracture. Degenerative disc disease with disc osteophyte complex is noted at several levels in the mid and lower cervical spine as described above. Bony neural foraminal encroachment is also noted related to facet and uncovertebral hypertrophy. . Signed by Dr Gloria Collier on 4/19/2021 9:39 AM    Xr Chest Portable    Result Date: 4/19/2021  XR CHEST PORTABLE 4/19/2021 9:18 AM History: Syncope. Fall injury. One view chest x-ray compared with 8 October 2012. Normal heart size. Mildly prominent aortic arch with moderate calcification. Diffuse interstitial lung disease with a few granulomas. No pneumonia, pneumothorax, or heart failure. 1. Chronic interstitial lung disease. 2. No acute abnormality. Signed by Dr Savita Zendejas on 4/19/2021 9:19 AM      Pertinent Labs:   CBC:   Recent Labs     04/21/21 0312 04/22/21  0422 04/23/21  0313   WBC 11.5* 8.1 8.7   HGB 11.8* 11.5* 11.2*    118* 116*     BMP:    Recent Labs     04/21/21  0312 04/22/21  0422 04/23/21  0313    137 139   K 3.8 3.3* 3.1*    104 102   CO2 24 24 24   BUN 8 8 6*   CREATININE 0.5 0.4* 0.5   GLUCOSE 125* 106 100     INR: No results for input(s): INR in the last 72 hours. ABGs:No results for input(s): PH, PO2, PCO2, HCO3, BE, O2SAT in the last 72 hours. Lactic Acid:No results for input(s): LACTA in the last 72 hours.     Procedures: None  Hospital Course: 78-year-old female with advanced dementia presented to the ED 4/19 following a fall at [facility] with reported witnessed seizure-like activity.  Staff at [facility] noted patient briefly lost consciousness while ambulating and fell backwards and hit the back of her head.  Admitted with traumatic subarachnoid hemorrhage after CT head showed left frontal hemorrhage, but no mass-effect or shift.  Patient initially monitored in ICU and did require soft restraints for safety.  Developed sinus bradycardia overnight however heart rate fluctuating 40s60s.  Repeat CT head a.m. 4/20 shows similar to slightly increased subarachnoid hemorrhage, and new small subdural hemorrhage, but no midline shift.  Hemorrhage nonsurgical.  Neurosurgery and neurology following.  Patient remained stable and transferred to the floor. Patient with fever and active urinary sediment on UA started on antibiotics. She has been afebrile for the last 24 hours. Placement has been found for her and she is hemodynamically stable for discharge today to follow-up with PCP, neurology, neurosurgery as an outpatient. Patient has urinary retention and has required Lubin placement during admission and she will be scheduled for outpatient urology follow-up as an outpatient.     Physical Exam:  Vitals: /61   Pulse 72   Temp 97.8 °F (36.6 °C) (Temporal)   Resp 16   Ht 5' 5\" (1.651 m)   Wt 121 lb (54.9 kg)   SpO2 94%   BMI 20.14 kg/m²   24HR INTAKE/OUTPUT:      Intake/Output Summary (Last 24 hours) at 4/23/2021 1350  Last data filed at 4/23/2021 7940  Gross per 24 hour   Intake 1356 ml   Output 2350 ml   Net -994 ml     General appearance: alert and cooperative with exam  HEENT: AT/NC  Lungs: no increased work of breathing, BLAE, no wheezing  Heart: RRR, no murmurs appreciated  Abdomen: BS+, soft, NT, ND  Extremities: no edema, pulses+  Neurologic: Alert, gross motor function intact  Skin: warm    Discharge Medications:       Chris Patel   Home Medication Instructions [id number]    Printed on:04/23/21 1350   Medication Information                      cefdinir (OMNICEF) 300 MG capsule  Take 1 capsule by mouth 2 times daily for 7 days             colestipol (COLESTID) 1 g tablet  Take 1 tablet by mouth daily             levETIRAcetam (KEPPRA) 500 MG tablet  Take 1 tablet by mouth 2 times daily for 4 days             QUEtiapine (SEROQUEL) 25 MG tablet  TAKE 1 OR 2 TABLETS BY MOUTH EACH EVENING             vitamin D (ERGOCALCIFEROL) 1.25 MG (79020 UT) CAPS capsule  Take 1 capsule by mouth once a week                 Discharge Instructions: Follow up with Javier Paz MD in 7 days. Take medications as directed. Resume activity as tolerated. Diet: DIET DYSPHAGIA MINCED AND MOIST;     Disposition: Patient is medically stable and will be discharged Skilled nursing facility. Time spent on discharge 35 minutes.     Signed:  Michelle Alvarenga MD 4/23/2021 1:50 PM

## 2021-04-23 NOTE — DISCHARGE INSTR - DIET

## 2021-04-23 NOTE — CARE COORDINATION
Pt has been accepted at [facility]. Pt may DC on Friday April 23rd if medically cleared. Pt will need updated negative covid test before DC.    [facility]  [phone]  Electronically signed by Liset Garrison on 4/23/2021 at 9:58 AM

## 2021-04-23 NOTE — PROGRESS NOTES
[facility]  Progress Note    LAST 24 HOURS: No major issues overnight. Awake and does follow some commands. CEDILLO well. No significant changes. CHIEF COMPLAINT:  Fall at assisted living facility, head truma    HISTORY OF PRESENT ILLNESS:      The patient is a 67 y.o. female who presents to the ED following a fall today at [facility] assisted [facility] facility. She has a history of dementia and is not the best historian. She is unable to recall the event or any of her medical history. She denies any HAs. She sustained posterior occipital head trauma which required staples in her scalp. CT head revealed a left frontal and right convexity traumatic subarachnoid hemorrhage for which I was asked to evaluate. The patient does not take anticoagulation medication.        Past Medical History:   Diagnosis Date    ADD (attention deficit disorder)     Carotid atherosclerosis     Degeneration of cervical intervertebral disc     Mixed hyperlipidemia     Nontoxic multinodular goiter     OA (osteoarthritis)     Osteopenia     Palliative care patient 04/20/2021    Panic disorder without agoraphobia     Peripheral neuropathy     Pure hypercholesterolemia     Urinary incontinence     Vitamin D deficiency        Past Surgical History:   Procedure Laterality Date    COLONOSCOPY      HYSTERECTOMY, TOTAL ABDOMINAL      w/removal of both ovaries    JOINT REPLACEMENT      knee    TOTAL KNEE ARTHROPLASTY Right 2011    TOTAL KNEE ARTHROPLASTY Left 2012    VAGINA SURGERY      Abdomino-Vaginal Vesical Neck Suspension        Medications    Current Facility-Administered Medications:     cefTRIAXone (ROCEPHIN) 1,000 mg in sterile water 10 mL IV syringe, 1,000 mg, Intravenous, Q24H, Vika Restrepo MD    levETIRAcetam (KEPPRA) tablet 500 mg, 500 mg, Oral, BID, Rachel Saavedra MD, 500 mg at 04/22/21 2009    enalaprilat (VASOTEC) injection 1.25 mg, 1.25 mg, Intravenous, Q6H PRN, Ines Borrego MD   atorvastatin (LIPITOR) tablet 40 mg, 40 mg, Oral, Nightly, Neto Hager MD, 40 mg at 04/22/21 2009    cholestyramine light packet 4 g, 4 g, Oral, Daily, Neto Hager MD, 4 g at 04/22/21 8304    [Held by provider] dextromethorphan-quiNIDine (NUEDEXTA) 20-10 MG per capsule 1 capsule, 1 capsule, Oral, Daily, MD Dia Gardner  [facility]] meclizine (ANTIVERT) tablet 25 mg, 25 mg, Oral, TID PRN, Neto Hager MD    QUEtiapine (SEROQUEL) tablet 25 mg, 25 mg, Oral, Nightly, Neto Hager MD, 25 mg at 04/22/21 2009    vitamin D (ERGOCALCIFEROL) capsule 50,000 Units, 50,000 Units, Oral, Weekly, Neto Hager MD    LORazepam (ATIVAN) injection 2 mg, 2 mg, Intravenous, Q4H PRN, Neto Hager MD    sodium chloride flush 0.9 % injection 5-40 mL, 5-40 mL, Intravenous, 2 times per day, Neto Hager MD, 10 mL at 04/20/21 0840    sodium chloride flush 0.9 % injection 5-40 mL, 5-40 mL, Intravenous, PRN, Neto Hager MD    0.9 % sodium chloride infusion, 25 mL, Intravenous, PRN, Neto Hager MD    promethazine (PHENERGAN) tablet 12.5 mg, 12.5 mg, Oral, Q6H PRN **OR** ondansetron (ZOFRAN) injection 4 mg, 4 mg, Intravenous, Q6H PRN, Neto Hager MD, 4 mg at 04/19/21 1120    acetaminophen (TYLENOL) tablet 650 mg, 650 mg, Oral, Q6H PRN **OR** acetaminophen (TYLENOL) suppository 650 mg, 650 mg, Rectal, Q6H PRN, Neto Hager MD    0.9 % sodium chloride infusion, , Intravenous, Continuous, Neto Hager MD, Last Rate: 75 mL/hr at 04/20/21 0600, Rate Verify at 04/20/21 0600    potassium chloride (KLOR-CON M) extended release tablet 40 mEq, 40 mEq, Oral, PRN **OR** potassium bicarb-citric acid (EFFER-K) effervescent tablet 40 mEq, 40 mEq, Oral, PRN, 40 mEq at 04/22/21 1897 **OR** potassium chloride 10 mEq/100 mL IVPB (Peripheral Line), 10 mEq, Intravenous, PRN, Neto Hager MD    magnesium sulfate 2000 mg in 50 mL IVPB premix, 2,000 mg, Intravenous, PRN, Adis Reeves MD Ilda    ipratropium-albuterol (DUONEB) nebulizer solution 1 ampule, 1 ampule, Inhalation, Q4H PRN, Emma Hunter MD  Latex, Alcohol, Astelin [azelastine hcl], Ciprofloxacin hcl, Fluticasone propionate, Hydroquinone, Livalo [pitavastatin], Other, and Vitamin b complex [b-100]    Social History  Social History     Tobacco Use   Smoking Status Former Smoker    Packs/day: 2.50    Years: 5.00    Pack years: 12.50    Quit date: [facility]    Years since quittin.3   Smokeless Tobacco Never Used     Social History     Substance and Sexual Activity   Alcohol Use Never    Frequency: Never         Family History   Problem Relation Age of Onset    Cancer Mother         Hodgkins Lymphoma, AML    Breast Cancer Mother     Heart Disease Other          REVIEW OF SYSTEMS:  Constitutional: No fevers No chills  Neck:No stiffness  Respiratory: No shortness of breath  Cardiovascular: No chest pain No palpitations  Gastrointestinal: No abdominal pain    Genitourinary: No Dysuria  Neurological: No headache, + confusion    PHYSICAL EXAM:  Vitals:    21 0405   BP: 137/63   Pulse: 52   Resp: 17   Temp: 98.9 °F (37.2 °C)   SpO2: 94%       Constitutional: The patient appears well-developed and well-nourished. Eyes  conjunctiva normal.  Conjugate Gaze  Ear, nose, throat - No scars, masses, or lesions over external nose or ears, no atrophy of tongue  Neck-symmetric, no masses noted, no jugular vein distension  Respiration- chest wall appears symmetric, good expansion, normal effort without use of accessory muscles  Musculoskeletal  no significant wasting of muscles noted, no bony deformities  Extremities-no clubbing, cyanosis or edema  Skin  warm, dry, and intact. No rash, erythema, or pallor.       Neurologic Examination  Awake, Alert, unable to state year, location or president  Normal speech pattern, following commands  Motor 5/5 all extremities, no clear drift  PERRL, EOMI  No deficits to light touch DATA:  Nursing/pcp notes, imaging,labs and vitals reviewed. PT,OT and/or speech notes reviewed    Lab Results   Component Value Date    WBC 8.7 04/23/2021    HGB 11.2 (L) 04/23/2021    HCT 33.7 (L) 04/23/2021    MCV 91.6 04/23/2021     (L) 04/23/2021     Lab Results   Component Value Date     04/23/2021    K 3.1 (L) 04/23/2021     04/23/2021    CO2 24 04/23/2021    BUN 6 (L) 04/23/2021    CREATININE 0.5 04/23/2021    GLUCOSE 100 04/23/2021    CALCIUM 8.2 (L) 04/23/2021    PROT 6.9 04/19/2021    LABALBU 4.4 04/19/2021    BILITOT 0.8 04/19/2021    ALKPHOS 109 (H) 04/19/2021    AST 32 04/19/2021    ALT 23 04/19/2021    LABGLOM >60 04/23/2021    GFRAA >59 04/23/2021     Lab Results   Component Value Date    INR 0.98 04/19/2021    PROTIME 12.9 04/19/2021     EXAMINATION: CT head without contrast 4/19/2021   HISTORY: Posterior head injury. Question seizure. FINDINGS: Multiple contiguous axial views are obtained from the skull   base to the vertex per protocol without intravenous contrast   enhancement with reformatted images obtained in the sagittal and   coronal projections from the original data set. There is atrophy of the brain with prominence of the subarachnoid   spaces and ventricular enlargement. Small vessel ischemic disease is   noted involving the periventricular and higher white matter tracts. On images through the right parietal vertex there is evidence of   subarachnoid hemorrhage which is demonstrated on images 24 through 26   of the axial sequence. There is also left frontal hemorrhage including   what I suspect to be predominantly subarachnoid hemorrhage overlying   the left frontal cortex as well as a small amount of blood within the   anterior interhemispheric fissure within the subdural space measuring   5 mm in thickness. There is no associated mass effect. There is no   associated calvarial injury. There is no mass effect or shift of the   midline.  No evidence of acute along the posterior intercerebral   falx. Minimal left-to-right midline shifting measuring 4 mm. Ventricles similar in size and configuration.    Signed by Dr Lyle Bonilla on 4/22/2021 8:00 AM         IMPRESSION  67year old female, hx of dementia, s/p fall at her assisted living facility with traumatic subarachnoid hemorrhage noted on CT    RECOMMENDATIONS:    No urgent neurosurgical intervention warranted  Maintain SBP   Keppra 500 BID x 7 days (started on 4/19/2021)  Case management referral for disposition planning, cleared for discharge from a neurosurgical standpoint  Avoid any anticoagulation/antiplatelet medications for now  Follow up with our team in 1-2 weeks      Thuan Wills DO

## 2021-04-23 NOTE — PROGRESS NOTES
Patient:   Ziyad Calero  MR#:    [id number]   Room:    5927/907-36   YOB: 1945  Date of Progress Note: 4/23/2021  Time of Note                           8:13 AM  Consulting Physician:   Boby Webb M.D. Attending Physician:  Delmy Kim MD     Chief complaint AMS possible seizure like activity post fall     S:This 67 y.o. female  with a past medical history significant for dementia is seen post fall with seizure like activity. The history is obtained from the chart and nurse. The staff at assisted living facility where they noted her to lost consciousness while walking and fell backwards and hit the back of her head. There was a report of seizure like activity. Following she was combative with a non focal exam except for asymmetric pupils. The ER reported she was at her baseline based upon previous records. Her CT of the brain in the ER had evidence of a left frontal hemorrhage with some subarachnoid blood left frontal region and high right parietal cortex with no mass effect of shift. No new issues overnight.      REVIEW OF SYSTEMS:  Unable to obtain due to mental status    Past Medical History:      Diagnosis Date    ADD (attention deficit disorder)     Carotid atherosclerosis     Degeneration of cervical intervertebral disc     Mixed hyperlipidemia     Nontoxic multinodular goiter     OA (osteoarthritis)     Osteopenia     Palliative care patient 04/20/2021    Panic disorder without agoraphobia     Peripheral neuropathy     Pure hypercholesterolemia     Urinary incontinence     Vitamin D deficiency        Past Surgical History:      Procedure Laterality Date    COLONOSCOPY      HYSTERECTOMY, TOTAL ABDOMINAL      w/removal of both ovaries    JOINT REPLACEMENT      knee    TOTAL KNEE ARTHROPLASTY Right 2011    TOTAL KNEE ARTHROPLASTY Left 2012    VAGINA SURGERY      Abdomino-Vaginal Vesical Neck Suspension       Medications in Hospital:      Current Facility-Administered Medications:     cefTRIAXone (ROCEPHIN) 1,000 mg in sterile water 10 mL IV syringe, 1,000 mg, Intravenous, Q24H, Giuseppe Armijo MD    levETIRAcetam (KEPPRA) tablet 500 mg, 500 mg, Oral, BID, Shey Johnson MD, 500 mg at 04/22/21 2009    enalaprilat (VASOTEC) injection 1.25 mg, 1.25 mg, Intravenous, Q6H PRN, Estela Rahman MD    atorvastatin (LIPITOR) tablet 40 mg, 40 mg, Oral, Nightly, Estela Rahman MD, 40 mg at 04/22/21 2009    cholestyramine light packet 4 g, 4 g, Oral, Daily, Estela Rahman MD, 4 g at 04/22/21 4683    [Held by provider] dextromethorphan-quiNIDine (NUEDEXTA) 20-10 MG per capsule 1 capsule, 1 capsule, Oral, Daily, MD Muniar Jacobo [facility] [place] by provider] meclizine (ANTIVERT) tablet 25 mg, 25 mg, Oral, TID PRN, Estela Rahman MD    QUEtiapine (SEROQUEL) tablet 25 mg, 25 mg, Oral, Nightly, Estela Rahman MD, 25 mg at 04/22/21 2009    vitamin D (ERGOCALCIFEROL) capsule 50,000 Units, 50,000 Units, Oral, Weekly, Estela Rahman MD    LORazepam (ATIVAN) injection 2 mg, 2 mg, Intravenous, Q4H PRN, Estela Rahman MD    sodium chloride flush 0.9 % injection 5-40 mL, 5-40 mL, Intravenous, 2 times per day, Estela Rahman MD, 10 mL at 04/20/21 0840    sodium chloride flush 0.9 % injection 5-40 mL, 5-40 mL, Intravenous, PRN, Esetla Rahman MD    0.9 % sodium chloride infusion, 25 mL, Intravenous, PRN, Estela Rahman MD    promethazine (PHENERGAN) tablet 12.5 mg, 12.5 mg, Oral, Q6H PRN **OR** ondansetron (ZOFRAN) injection 4 mg, 4 mg, Intravenous, Q6H PRN, Estela Rahman MD, 4 mg at 04/19/21 1120    acetaminophen (TYLENOL) tablet 650 mg, 650 mg, Oral, Q6H PRN **OR** acetaminophen (TYLENOL) suppository 650 mg, 650 mg, Rectal, Q6H PRN, Estela Rahman MD    0.9 % sodium chloride infusion, , Intravenous, Continuous, Estela Rahman MD, Last Rate: 75 mL/hr at 04/20/21 0600, Rate Verify at 04/20/21 0600    potassium chloride (KLOR-CON M) extended release tablet 40 mEq, 40 mEq, Oral, PRN **OR** potassium bicarb-citric acid (EFFER-K) effervescent tablet 40 mEq, 40 mEq, Oral, PRN, 40 mEq at 04/22/21 5607 **OR** potassium chloride 10 mEq/100 mL IVPB (Peripheral Line), 10 mEq, Intravenous, PRN, Joan Olivia MD    magnesium sulfate 2000 mg in 50 mL IVPB premix, 2,000 mg, Intravenous, PRN, Joan Olivia MD    ipratropium-albuterol (DUONEB) nebulizer solution 1 ampule, 1 ampule, Inhalation, Q4H PRN, Joan Olivia MD    Allergies:  Latex, Alcohol, Astelin [azelastine hcl], Ciprofloxacin hcl, Fluticasone propionate, Hydroquinone, Livalo [pitavastatin], Other, and Vitamin b complex [b-100]    Social History:   TOBACCO:   reports that she quit smoking about 31 years ago. She has a 12.50 pack-year smoking history. She has never used smokeless tobacco.  ETOH:   reports no history of alcohol use. Family History:       Problem Relation Age of Onset   [facility] Cancer Mother         Hodgkins Lymphoma, AML    Breast Cancer Mother     Heart Disease Other          PHYSICAL EXAM:  /63   Pulse 52   Temp 98.9 °F (37.2 °C) (Temporal)   Resp 17   Ht 5' 5\" (1.651 m)   Wt 121 lb (54.9 kg)   SpO2 94%   BMI 20.14 kg/m²     Constitutional  well developed, well nourished. Eyes  conjunctiva normal.   Ear, nose, throat - No scars, masses, or lesions over external nose or ears, no atrophy of tongue  Neck-symmetric, no masses noted, no jugular vein distension  Respiration- chest wall appears symmetric, good expansion,   normal effort without use of accessory muscles  Musculoskeletal  no significant wasting of muscles noted, no bony deformities  Extremities-no clubbing, cyanosis or edema  Skin  warm, dry, and intact. No rash, erythema, or pallor.   Psychiatric  mood, affect, and behavior slow     Neurological exam  Awake, opens eyes to voice bradycardic fluent minimally verbal   not following commands  Attention and concentration appear impaired  Recent and remote memory appears impaired  Speech with dysarthria       Cranial Nerve Exam     CN III, IV,VI-EOMI, No nystagmus, conjugate eye movements, no ptosis    CN VII-no facial assymetry      Tremors- no tremors in hands or head noted        Nursing/pcp notes, imaging,labs and vitals reviewed. PT,OT and/or speech notes reviewed    Lab Results   Component Value Date    WBC 8.7 04/23/2021    HGB 11.2 (L) 04/23/2021    HCT 33.7 (L) 04/23/2021    MCV 91.6 04/23/2021     (L) 04/23/2021     Lab Results   Component Value Date     04/23/2021    K 3.1 (L) 04/23/2021     04/23/2021    CO2 24 04/23/2021    BUN 6 (L) 04/23/2021    CREATININE 0.5 04/23/2021    GLUCOSE 100 04/23/2021    CALCIUM 8.2 (L) 04/23/2021    PROT 6.9 04/19/2021    LABALBU 4.4 04/19/2021    BILITOT 0.8 04/19/2021    ALKPHOS 109 (H) 04/19/2021    AST 32 04/19/2021    ALT 23 04/19/2021    LABGLOM >60 04/23/2021    GFRAA >59 04/23/2021     Lab Results   Component Value Date    INR 0.98 04/19/2021    PROTIME 12.9 04/19/2021       CT HEAD WO CONTRAST [[id number]]    Resulted: 04/20/21 0742    Updated: 04/20/21 0744    Narrative:     CT head 4/20/2021 4:02 AM   HISTORY: Follow-up subarachnoid hemorrhage   TECHNIQUE: Axial images of the head were obtained without IV contrast.   Coronal and sagittal reformatted images are reconstructed and   reviewed. COMPARISON: 4/19/2021. DLP: 833 mGy cm Automated exposure control was utilized to minimize   patient radiation dose. FINDINGS:   There is similar to slightly increasing subarachnoid hemorrhage along   the convexities of the left greater than right cerebral hemisphere. A   small subdural hemorrhage along the intercerebral falx (measured on   image 25) measures 3 mm. There is a similar small left frontal   contusion considered. . Chronic small vessel ischemic changes are   noted.  An 8 mm calcification in the extra-axial space adjacent the   anterior left frontal lobe may be subarachnoid granulation   calcification, incidental calcified meningioma considered. Hyperostosis frontalis internus. No skull fracture. Impression:     1. Similar to slightly increased subarachnoid hemorrhage along the   convexities of the left greater than right sulci with small subdural   hemorrhage along the intercerebral falx measuring 3 mm in greatest   width. No midline shifting. Questionable subcentimeter left frontal   contusion. Signed by Dr Sally Cervantes on 4/20/2021 7:42 AM   EEG [[id number]]    Resulted: 04/20/21 0731    Updated: 04/20/21 0733    Narrative:     Germaine Richardson MD     4/20/2021  7:33 AM     Patient:   Susie Miranda   MR#:    [id number]   Room:    [address] [place] [address]   Date of Birth:   1945   Date of Progress Note: 4/20/2021   Time of Note                           7:31 AM   Consulting Physician:   Germaine Richardson M.D. Attending Physician: Seda Zhang MD         This is a multichannel digital EEG recording using the   international 10-20 placement system. Technical Summary:     Background EEG activity: The occipital dominant rhythm is 6-7 Hz. There is much low voltage 3-5 Hz activity seen in a generalized   distribution intermixed with low voltage 18-22 Hz activity. There   is moderate to high voltage moderate voltage 1.5-2 Hz activity   seen primarily in anterior regions. Photic Stimulation: No abnormal waveforms are noted with various   frequencies of flickering light. IMPRESSION:   This is an abnormal EEG due to the diffuse slowing of the   background. This finding is consistent with a diffuse disturbance   in cerebral function. No clear epileptiform activity was noted   during the recording period.        Dr Naila Sethi Certified in Neurology   Board Certified in [place] [address] Neurophysiology   Fellowship trained in [facility] 92 Time 25 minutes           RECORD REVIEW: Previous medical records, medications were reviewed at today's visit    IMPRESSION:   Intracerebral/subarahnoid hemorrhage post fall with possible seizure-no seizures noted in hospital   Patient with advance dementia-no significant change in neurological status     Currently awake to voice/stimulation minimally verbal        Non surgical hemorrhage  ICU monitoring with neuro checks  Repeat CT 4/22 stable  EEG slow no seizures noted  Change Keppra to oral for seizure prophylaxis-would continue for seizure at the onset of injury-Dementia increases risk for seizures as well  BP ok  DVT prophylaxis-SCDs  On NS IVF-taking PO  PT/OT/Speech    No appropriate candidate for rehab too low functioning/inabiltiy to follow commands     Supportive care    Ok to DC to NH from neuro standpoint   F/u PRN    Supportive care      [address] CELL  Dr Jasson Thacker

## 2021-04-23 NOTE — PROGRESS NOTES
Occupational Therapy     04/23/21 9095   Restrictions/Precautions   Restrictions/Precautions Fall Risk;Seizure   Required Braces or Orthoses? No   Vision   Vision [facility]   Hearing   Hearing [facility]   General   Chart Reviewed Yes   Patient assessed for rehabilitation services? Yes   Additional Pertinent Hx Advanced dementia; (B) TKA; neuropathy; incontinence; goiter. Response to previous treatment Patient with no complaints from previous session   Family / Caregiver Present No   Diagnosis subarachnoid hemorrahage; posterior occipital trauma (both due to fall). Subjective   Subjective Pt in bed upon arrival for therapy and agreeable to get up into reclining chair. Pt states she is having a BM and required assistance with clean up. Pain Assessment   Patient Currently in Pain Denies   ADL   Grooming Maximum assistance   UE Dressing Maximum assistance   LE Dressing Maximum assistance   Toileting Dependent/Total   Additional Comments Pt incontinent with bowel and required assistance with getting cleaned up. Balance   Sitting Balance Contact guard assistance   Standing Balance Moderate assistance   Functional Mobility   Functional - Mobility Device Rolling Walker   Activity Other  (bedside transfer to reclining chair)   Assist Level Moderate assistance   Functional Mobility Comments Pt requires increased cues for sequencing during transition. Bed mobility   Rolling to Right Moderate assistance   Supine to Sit Maximum assistance   Scooting Maximal assistance   Transfers   Stand Step Transfers Moderate assistance  (with cues for sequencing and safety.)   Sit to stand Moderate assistance   Stand to sit Moderate assistance   Short term goals   Time Frame for Short term goals 1-2 weeks   Short term goal 1 Complete pre-ADL purposeful movements/activities with min A for execution. Short term goal 2 Roll R and L with min A to assist with bedlevel ADLs. Short term goal 3 Complete transfers with CGA and PRN DME.    Long term goals   Long term goal 1 Upgrade as tolerated. Assessment   Performance deficits / Impairments Decreased cognition;Decreased safe awareness;Decreased strength;Decreased ADL status; Decreased functional mobility ; Decreased endurance;Decreased balance;Decreased coordination;Decreased posture   Assessment Pt requires increased cues for sequencing and attention to tasks throughout tx. Prognosis Poor; Fair   REQUIRES OT FOLLOW UP Yes   Treatment Initiated  Tx to focus on getting cleaned up, changing clothing and grooming, t/f from bed to reclining chair to sit up for lunch. Activity Tolerance   Activity Tolerance Patient Tolerated treatment well;Treatment limited secondary to decreased cognition   Safety Devices   Safety Devices in place Yes   Type of devices Chair alarm in place;Call light within reach;Nurse notified   Restraints   Initially in place Yes   Plan   Times per week 1-3   Current Treatment Recommendations Cognitive/Perceptual Training;Cognitive Reorientation; Functional Mobility Training;Patient/Caregiver Education & Training;Balance Training; Safety Education & Training;Positioning;Strengthening;Self-Care / ADL;Equipment Evaluation, Education, & procurement;Home Management Training; Endurance Training   Electronically signed by Corrinne Sella, OTA on 4/23/2021 at 2:14 PM

## 2021-04-24 LAB — URINE CULTURE, ROUTINE: NORMAL

## 2021-04-26 ENCOUNTER — TELEPHONE (OUTPATIENT)
Dept: INTERNAL MEDICINE | Age: 76
End: 2021-04-26

## 2021-04-26 NOTE — TELEPHONE ENCOUNTER
SKILLED NURSING FACILITY:   INITIAL CALL POST-HOSPITAL DISCHARGE    SNF: [facility]     PHONE NUMBER: [phone]    THERAPY: PT/OT     ANTICIPATED LENGTH OF STAY: unknown    Per Cassie Garcia, nursing staff at [address] Mrs. Kyrie Chan was admitted for short term rehab. She was evaluated by physical and occupational therapies for strengthening. She seems to be adapting well. There are no plans for discharge at this time.

## 2021-04-27 LAB
BLOOD CULTURE, ROUTINE: NORMAL
CULTURE, BLOOD 2: NORMAL

## 2021-05-06 ENCOUNTER — TELEPHONE (OUTPATIENT)
Dept: INTERNAL MEDICINE | Age: 76
End: 2021-05-06

## 2021-05-06 NOTE — TELEPHONE ENCOUNTER
[place], [phone]    Per Josue Olivas, nursing staff at [facility], patient not participating with therapy. She is still total care. She is not able to stand or walk and is completely dependant on nursing staff. Her current admission status is still short term rehab, however they are looking into long term care placement for her.

## 2021-05-14 ENCOUNTER — TELEPHONE (OUTPATIENT)
Dept: INTERNAL MEDICINE | Age: 76
End: 2021-05-14

## 2021-05-14 NOTE — TELEPHONE ENCOUNTER
Per Mauricio Welch,  at [place], Mrs. Gissel Pascual is now long term care with no plans of returning home. I will sign off for TCM at this time.

## 2021-05-24 ENCOUNTER — CARE COORDINATION (OUTPATIENT)
Dept: CASE MANAGEMENT | Age: 76
End: 2021-05-24

## 2021-05-24 NOTE — CARE COORDINATION
Pt is  per Patient Natividad Borja, JULIUSN, RN   [address] [place] [address] Transition Nurse  [phone]